# Patient Record
Sex: FEMALE | Race: WHITE | Employment: PART TIME | URBAN - METROPOLITAN AREA
[De-identification: names, ages, dates, MRNs, and addresses within clinical notes are randomized per-mention and may not be internally consistent; named-entity substitution may affect disease eponyms.]

---
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
## 2018-06-05 ENCOUNTER — HOSPITAL ENCOUNTER (OUTPATIENT)
Age: 18
Setting detail: SPECIMEN
Discharge: HOME | End: 2018-06-05
Payer: COMMERCIAL

## 2018-06-05 ENCOUNTER — NURSE ONLY (OUTPATIENT)
Dept: FAMILY MEDICINE CLINIC | Age: 18
End: 2018-06-05
Payer: COMMERCIAL

## 2018-06-05 VITALS — HEART RATE: 80 BPM | RESPIRATION RATE: 20 BRPM | TEMPERATURE: 98 F

## 2018-06-05 DIAGNOSIS — R39.15 URGENCY OF URINATION: ICD-10-CM

## 2018-06-05 DIAGNOSIS — R39.15 URGENCY OF URINATION: Primary | ICD-10-CM

## 2018-06-05 LAB
BILIRUBIN, POC: NORMAL
BLOOD URINE, POC: NORMAL
CLARITY, POC: CLEAR
COLOR, POC: NORMAL
GLUCOSE URINE, POC: NORMAL
KETONES, POC: NORMAL
LEUKOCYTE EST, POC: NORMAL
NITRITE, POC: NORMAL
PH, POC: 6
PROTEIN, POC: NORMAL
SPECIFIC GRAVITY, POC: 1.02
UROBILINOGEN, POC: NORMAL

## 2018-06-05 PROCEDURE — 81002 URINALYSIS NONAUTO W/O SCOPE: CPT | Performed by: NURSE PRACTITIONER

## 2018-06-05 PROCEDURE — 99211 OFF/OP EST MAY X REQ PHY/QHP: CPT | Performed by: NURSE PRACTITIONER

## 2018-06-06 ENCOUNTER — HOSPITAL ENCOUNTER (OUTPATIENT)
Age: 18
Setting detail: SPECIMEN
Discharge: HOME | End: 2018-06-06
Payer: COMMERCIAL

## 2018-06-06 ENCOUNTER — OFFICE VISIT (OUTPATIENT)
Dept: FAMILY MEDICINE CLINIC | Age: 18
End: 2018-06-06
Payer: COMMERCIAL

## 2018-06-06 VITALS
BODY MASS INDEX: 32.02 KG/M2 | SYSTOLIC BLOOD PRESSURE: 120 MMHG | TEMPERATURE: 97.6 F | WEIGHT: 204 LBS | RESPIRATION RATE: 20 BRPM | HEIGHT: 67 IN | DIASTOLIC BLOOD PRESSURE: 78 MMHG | HEART RATE: 92 BPM

## 2018-06-06 DIAGNOSIS — N76.0 BV (BACTERIAL VAGINOSIS): ICD-10-CM

## 2018-06-06 DIAGNOSIS — N94.10 DYSPAREUNIA, FEMALE: ICD-10-CM

## 2018-06-06 DIAGNOSIS — Z91.030 HISTORY OF BEE STING ALLERGY: ICD-10-CM

## 2018-06-06 DIAGNOSIS — N89.8 VAGINAL IRRITATION: ICD-10-CM

## 2018-06-06 DIAGNOSIS — N89.8 VAGINAL IRRITATION: Primary | ICD-10-CM

## 2018-06-06 DIAGNOSIS — B96.89 BV (BACTERIAL VAGINOSIS): ICD-10-CM

## 2018-06-06 LAB
C. TRACHOMATIS DNA ,URINE: NEGATIVE
CULTURE: NORMAL
CULTURE: NORMAL
DIRECT EXAM: ABNORMAL
Lab: ABNORMAL
Lab: NORMAL
SPECIMEN DESCRIPTION: ABNORMAL
SPECIMEN DESCRIPTION: NORMAL
STATUS: ABNORMAL
STATUS: NORMAL

## 2018-06-06 PROCEDURE — 99214 OFFICE O/P EST MOD 30 MIN: CPT | Performed by: NURSE PRACTITIONER

## 2018-06-06 RX ORDER — METRONIDAZOLE 500 MG/1
500 TABLET ORAL 2 TIMES DAILY
Qty: 14 TABLET | Refills: 0 | Status: SHIPPED | OUTPATIENT
Start: 2018-06-06 | End: 2018-06-13

## 2018-06-06 RX ORDER — EPINEPHRINE 0.3 MG/.3ML
0.3 INJECTION SUBCUTANEOUS ONCE
Qty: 1 EACH | Refills: 0 | Status: SHIPPED | OUTPATIENT
Start: 2018-06-06 | End: 2021-02-18

## 2018-06-06 ASSESSMENT — ENCOUNTER SYMPTOMS
VOMITING: 0
DIARRHEA: 0
COUGH: 0
CONSTIPATION: 0
SHORTNESS OF BREATH: 0
NAUSEA: 0
ABDOMINAL PAIN: 0

## 2018-06-06 ASSESSMENT — PATIENT HEALTH QUESTIONNAIRE - PHQ9
SUM OF ALL RESPONSES TO PHQ9 QUESTIONS 1 & 2: 0
1. LITTLE INTEREST OR PLEASURE IN DOING THINGS: 0
SUM OF ALL RESPONSES TO PHQ QUESTIONS 1-9: 0
2. FEELING DOWN, DEPRESSED OR HOPELESS: 0

## 2018-08-01 ENCOUNTER — OFFICE VISIT (OUTPATIENT)
Dept: FAMILY MEDICINE CLINIC | Age: 18
End: 2018-08-01
Payer: COMMERCIAL

## 2018-08-01 ENCOUNTER — HOSPITAL ENCOUNTER (OUTPATIENT)
Age: 18
Setting detail: SPECIMEN
Discharge: HOME | End: 2018-08-01
Payer: COMMERCIAL

## 2018-08-01 VITALS
TEMPERATURE: 98.3 F | BODY MASS INDEX: 32.49 KG/M2 | DIASTOLIC BLOOD PRESSURE: 82 MMHG | HEIGHT: 67 IN | OXYGEN SATURATION: 97 % | WEIGHT: 207 LBS | HEART RATE: 82 BPM | SYSTOLIC BLOOD PRESSURE: 108 MMHG

## 2018-08-01 DIAGNOSIS — Z72.51 HIGH RISK SEXUAL BEHAVIOR: ICD-10-CM

## 2018-08-01 DIAGNOSIS — N89.8 VAGINAL DISCHARGE: ICD-10-CM

## 2018-08-01 DIAGNOSIS — R30.0 DYSURIA: ICD-10-CM

## 2018-08-01 DIAGNOSIS — N94.10 DYSPAREUNIA IN FEMALE: Primary | ICD-10-CM

## 2018-08-01 LAB
BILIRUBIN, POC: NEGATIVE
BLOOD URINE, POC: NEGATIVE
CLARITY, POC: CLEAR
COLOR, POC: YELLOW
CONTROL: DETECTED
DIRECT EXAM: NORMAL
GLUCOSE URINE, POC: NORMAL
KETONES, POC: NEGATIVE
LEUKOCYTE EST, POC: NEGATIVE
Lab: NORMAL
NITRITE, POC: NEGATIVE
PH, POC: 6.5
PREGNANCY TEST URINE, POC: NEGATIVE
PROTEIN, POC: NEGATIVE
SPECIFIC GRAVITY, POC: 1.02
SPECIMEN DESCRIPTION: NORMAL
STATUS: NORMAL
UROBILINOGEN, POC: NEGATIVE

## 2018-08-01 PROCEDURE — 99214 OFFICE O/P EST MOD 30 MIN: CPT | Performed by: NURSE PRACTITIONER

## 2018-08-01 PROCEDURE — 81025 URINE PREGNANCY TEST: CPT | Performed by: NURSE PRACTITIONER

## 2018-08-01 PROCEDURE — 81002 URINALYSIS NONAUTO W/O SCOPE: CPT | Performed by: NURSE PRACTITIONER

## 2018-08-01 ASSESSMENT — ENCOUNTER SYMPTOMS
NAUSEA: 0
SORE THROAT: 0
EYE REDNESS: 0
COUGH: 0
BLOOD IN STOOL: 0
CHEST TIGHTNESS: 0
BACK PAIN: 0
VOMITING: 0
WHEEZING: 0
SHORTNESS OF BREATH: 0
ABDOMINAL DISTENTION: 0
RHINORRHEA: 0
ABDOMINAL PAIN: 0
DIARRHEA: 0
SINUS PRESSURE: 0
EYE DISCHARGE: 0
EYE PAIN: 0
CONSTIPATION: 0

## 2018-08-01 NOTE — PROGRESS NOTES
Subjective:      Patient ID: Tameka Bryan is a 19 y.o. female. Visit Information    Have you changed or started any medications since your last visit including any over-the-counter medicines, vitamins, or herbal medicines? no   Are you having any side effects from any of your medications? -  no  Have you stopped taking any of your medications? Is so, why? -  no    Have you seen any other physician or provider since your last visit? No  Have you had any other diagnostic tests since your last visit? No  Have you been seen in the emergency room and/or had an admission to a hospital since we last saw you? No  Have you had your routine dental cleaning in the past 6 months? no    Have you activated your [employer] account? If not, what are your barriers? No     Patient Care Team:  Kamlesh Arguello MD as PCP - General (Internal Medicine)  ABBEY Negrete CNP as PCP - [facility] Attributed Provider    Medical History Review  Past Medical, Family, and Social History reviewed and does not contribute to the patient presenting condition    Health Maintenance   Topic Date Due    HIV screen  01/19/2013    Meningococcal (MCV) Vaccine Age 0-22 Years (1 of 1) 01/19/2014    Flu vaccine (1) 09/03/2018 (Originally 9/1/2018)    Chlamydia screen  06/05/2019    DTaP/Tdap/Td vaccine (7 - Td) 01/14/2021       PHQ Scores 6/6/2018 10/28/2016   PHQ2 Score 0 0   PHQ9 Score 0 0     Interpretation of Total Score Depression Severity: 1-4 = Minimal depression, 5-9 = Mild depression, 10-14 = Moderate depression, 15-19 = Moderately severe depression, 20-27 = Severe depression    Current Outpatient Prescriptions   Medication Sig Dispense Refill    EPINEPHrine (EPIPEN) 0.3 MG/0.3ML SOAJ injection Inject 0.3 mLs into the muscle once for 1 dose Use as directed for allergic reaction 1 each 0     No current facility-administered medications for this visit.         Vaginal Pain   The patient's primary symptoms include vaginal normal. She exhibits no distension. There is no hepatosplenomegaly. There is no tenderness. There is no rebound and no CVA tenderness. Genitourinary: No labial fusion. There is no rash, tenderness or lesion on the right labia. There is no rash, tenderness or lesion on the left labia. No tenderness in the vagina. Vaginal discharge (moderate amount pale yellow fluid) found. Genitourinary Comments: MIC Back present for exam    Musculoskeletal: Normal range of motion. She exhibits no edema or tenderness. Lymphadenopathy:     She has no cervical adenopathy. Neurological: She is alert and oriented to person, place, and time. She has normal strength. She exhibits normal muscle tone. Coordination normal.   Skin: Skin is warm, dry and intact. No rash noted. No erythema. Psychiatric: She has a normal mood and affect. Her speech is normal and behavior is normal. Thought content normal.   Nursing note and vitals reviewed. Assessment:      Diagnosis Orders   1. Dyspareunia in female  POCT Urinalysis no Micro   2. Vaginal discharge  VAGINITIS DNA PROBE   3. High risk sexual behavior  POCT urine pregnancy    VAGINITIS DNA PROBE   4. Dysuria  POCT Urinalysis no Micro       Plan:     Proceed with UA today   Proceed with pregnancy test - negative  Proceed with send vaginitis panel  Gave contact information for [employer]   Use lubricant for intercourse /delay penetration to help with pain  Monitor for worsening symptoms   Call with concerns   Return if symptoms worsen or fail to improve. Diamond Headley received counseling on the following healthy behaviors: nutrition, exercise and medication adherence  Reviewed prior labs and health maintenance  Continue current medications, diet and exercise. Discussed use, benefit, and side effects of prescribed medications. Barriers to medication compliance addressed.    Patient given educational materials - see patient instructions  Was a self-tracking handout given in paper form or via [address]? No    Requested Prescriptions      No prescriptions requested or ordered in this encounter       All patient questions answered. Patient voiced understanding. Quality Measures    Body mass index is 32.42 kg/m². Elevated. Weight control planned discussed Healthy diet and regular exercise. BP: 108/82 Blood pressure is normal. Treatment plan consists of No treatment change needed.     No results found for: LDLCALC, LDLCHOLESTEROL, LDLDIRECT (goal LDL reduction with dx if diabetes is 50% LDL reduction)      PHQ Scores 6/6/2018 10/28/2016   PHQ2 Score 0 0   PHQ9 Score 0 0     Interpretation of Total Score Depression Severity: 1-4 = Minimal depression, 5-9 = Mild depression, 10-14 = Moderate depression, 15-19 = Moderately severe depression, 20-27 = Severe depression          Electronically signed by ABBEY Grijalva CNP on 8/6/2018 at 12:38 AM

## 2018-08-01 NOTE — PATIENT INSTRUCTIONS
Patient Education        Painful Sex: Care Instructions  Your Care Instructions    Painful sex can be caused by many things. You may have an injury, an infection, or a growth in your vagina. Or maybe you have muscle spasms. In some cases, the pain is caused by another medical condition, such as a spinal problem. Some medicines can cause dryness in the vagina. And as a woman gets older, her vagina gets drier. It may also narrow, shorten, and get stiffer. This dryness can make sex painful. Talk to your doctor about what might be causing your painful sex. Treatment may help. Follow-up care is a key part of your treatment and safety. Be sure to make and go to all appointments, and call your doctor if you are having problems. It's also a good idea to know your test results and keep a list of the medicines you take. How can you care for yourself at home? · Use a vaginal lubricant during sex. Examples are Astroglide, K-Y Jelly, and Wet Gel Lubricant. · Increase the time you and your partner spend touching each other before sex. This is called foreplay. · Try different positions for sex to find the most comfortable ones. · Ask your doctor about exercises to strengthen and relax your pelvic muscles. · Before sex, take a warm bath. This can relax you and reduce anxiety. · If your doctor prescribes any medicines, take them exactly as prescribed. Call your doctor if you think you are having a problem with your medicine. When should you call for help? Watch closely for changes in your health, and be sure to contact your doctor if you have any problems. Where can you learn more? Go to https://juanito.Trovebox. org and sign in to your [employer] account. Enter G982 in the [place] box to learn more about \"Painful Sex: Care Instructions. \"     If you do not have an account, please click on the \"Sign Up Now\" link.   Current as of: October 6, 2017  Content Version: 11.6  © 4750-1827 Healthwise, Incorporated. Care instructions adapted under license by [facility] ([facility]). If you have questions about a medical condition or this instruction, always ask your healthcare professional. [place] [address] any warranty or liability for your use of this information. Patient Education        Painful Urination (Dysuria): Care Instructions  Your Care Instructions  Burning pain with urination (dysuria) is a common symptom of a urinary tract infection or other urinary problems. The bladder may become inflamed. This can cause pain when the bladder fills and empties. You may also feel pain if the tube that carries urine from the bladder to the outside of the body (urethra) gets irritated or infected. Sexually transmitted infections (STIs) also may cause pain when you urinate. Sometimes the pain can be caused by things other than an infection. The urethra can be irritated by soaps, perfumes, or foreign objects in the urethra. Kidney stones can cause pain when they pass through the urethra. The cause may be hard to find. You may need tests. Treatment for painful urination depends on the cause. Follow-up care is a key part of your treatment and safety. Be sure to make and go to all appointments, and call your doctor if you are having problems. It's also a good idea to know your test results and keep a list of the medicines you take. How can you care for yourself at home? · Drink extra water for the next day or two. This will help make the urine less concentrated. (If you have kidney, heart, or liver disease and have to limit fluids, talk with your doctor before you increase the amount of fluids you drink.)  · Avoid drinks that are carbonated or have caffeine. They can irritate the bladder. · Urinate often. Try to empty your bladder each time. For women:  · Urinate right after you have sex. · After going to the bathroom, wipe from front to back.   · Avoid douches, bubble baths, and feminine hygiene sprays. And avoid other feminine hygiene products that have deodorants. When should you call for help? Call your doctor now or seek immediate medical care if:    · You have new symptoms, such as fever, nausea, or vomiting.     · You have new or worse symptoms of a urinary problem. For example:  ¨ You have blood or pus in your urine. ¨ You have chills or body aches. ¨ It hurts worse to urinate. ¨ You have groin or belly pain. ¨ You have pain in your back just below your rib cage (the flank area).    Watch closely for changes in your health, and be sure to contact your doctor if you have any problems. Where can you learn more? Go to https://SimmerypeInstacarteb.Cable-Sense. org and sign in to your ScalingData account. Enter P287 in the Offermatica box to learn more about \"Painful Urination (Dysuria): Care Instructions. \"     If you do not have an account, please click on the \"Sign Up Now\" link. Current as of: May 12, 2017  Content Version: 11.6  © 8754-2847 [employer]App, Incorporated. Care instructions adapted under license by [facility] ([facility]). If you have questions about a medical condition or this instruction, always ask your healthcare professional. [address] any warranty or liability for your use of this information.

## 2019-05-31 ENCOUNTER — OFFICE VISIT (OUTPATIENT)
Dept: FAMILY MEDICINE CLINIC | Age: 19
End: 2019-05-31
Payer: COMMERCIAL

## 2019-05-31 VITALS
RESPIRATION RATE: 20 BRPM | BODY MASS INDEX: 37.43 KG/M2 | SYSTOLIC BLOOD PRESSURE: 110 MMHG | HEART RATE: 80 BPM | DIASTOLIC BLOOD PRESSURE: 68 MMHG | WEIGHT: 239 LBS | TEMPERATURE: 97.7 F

## 2019-05-31 DIAGNOSIS — N64.4 PAIN OF RIGHT BREAST: Primary | ICD-10-CM

## 2019-05-31 LAB
CONTROL: NORMAL
PREGNANCY TEST URINE, POC: NORMAL

## 2019-05-31 PROCEDURE — 99213 OFFICE O/P EST LOW 20 MIN: CPT | Performed by: PEDIATRICS

## 2019-05-31 PROCEDURE — 81025 URINE PREGNANCY TEST: CPT | Performed by: PEDIATRICS

## 2019-05-31 ASSESSMENT — ENCOUNTER SYMPTOMS
SHORTNESS OF BREATH: 0
WHEEZING: 0
COUGH: 0
STRIDOR: 0

## 2019-05-31 ASSESSMENT — PATIENT HEALTH QUESTIONNAIRE - PHQ9
SUM OF ALL RESPONSES TO PHQ QUESTIONS 1-9: 0
1. LITTLE INTEREST OR PLEASURE IN DOING THINGS: 0
SUM OF ALL RESPONSES TO PHQ QUESTIONS 1-9: 0
2. FEELING DOWN, DEPRESSED OR HOPELESS: 0
SUM OF ALL RESPONSES TO PHQ9 QUESTIONS 1 & 2: 0

## 2019-05-31 NOTE — PROGRESS NOTES
Subjective:      Patient ID: Rock Nelson is a 22 y.o. female. Visit Information    Have you changed or started any medications since your last visit including any over-the-counter medicines, vitamins, or herbal medicines? no   Are you having any side effects from any of your medications? -  no  Have you stopped taking any of your medications? Is so, why? -  no    Have you seen any other physician or provider since your last visit? No  Have you had any other diagnostic tests since your last visit? No  Have you been seen in the emergency room and/or had an admission to a hospital since we last saw you? No  Have you had your routine dental cleaning in the past 6 months? Have you activated your [employer] account? If not, what are your barriers?  No:      Patient Care Team:  ABBEY Wetzel CNP as PCP - General (Certified Nurse Practitioner)  ABBEY Wetzel CNP as PCP - [facility]    Medical History Review  Past Medical, Family, and Social History reviewed and does contribute to the patient presenting condition    Health Maintenance   Topic Date Due    Varicella Vaccine (2 of 2 - 2-dose childhood series) 01/19/2002    HPV vaccine (1 - Female 3-dose series) 01/19/2013    HIV screen  01/19/2013    Cervical cancer screen  01/19/2019    Chlamydia screen  06/05/2019    Flu vaccine (Season Ended) 09/01/2019    DTaP/Tdap/Td vaccine (7 - Td) 01/14/2021    Meningococcal (ACWY) Vaccine  Aged Out    Pneumococcal 0-64 years Vaccine  Aged Out       [facility] Scores 5/31/2019 6/6/2018 10/28/2016   PHQ2 Score 0 0 0   PHQ9 Score 0 0 0     Interpretation of Total Score DepressionSeverity: 1-4 = Minimal depression, 5-9 = Mild depression, 10-14 = Moderate depression, 15-19 = Moderately severe depression, 20-27 = Severe depression    Current Outpatient Medications   Medication Sig Dispense Refill    EPINEPHrine (EPIPEN) 0.3 MG/0.3ML SOAJ injection Inject 0.3 mLs into the muscle once for 1 dose Use Arm, Position: Sitting, Cuff Size: Medium Adult)   Pulse 80   Temp 97.7 °F (36.5 °C) (Tympanic)   Resp 20   Wt 239 lb (108.4 kg)   LMP 05/31/2019 (Exact Date)   Breastfeeding? No   BMI 37.43 kg/m²        Physical Exam   Pulmonary/Chest: Right breast exhibits tenderness. Right breast exhibits no inverted nipple, no mass, no nipple discharge and no skin change. Left breast exhibits no inverted nipple, no mass, no nipple discharge, no skin change and no tenderness. There is breast tenderness. No breast swelling, discharge or bleeding. Breasts are symmetrical.   Some tenderness over the right breast with some mild fibrocystic changes. There is no specific palpable lump. There is some very mild tenderness over the pectoralis muscle on the right. She does have some fibrocystic changes in the left breast as well. Nursing note and vitals reviewed. Assessment:      Diagnosis Orders   1. Pain of right breast  ANG DIGITAL DIAGNOSTIC W OR WO CAD BILATERAL    POCT urine pregnancy       Plan:     Obtain urine pregnancy test to rule out pregnancy -  Negative   Obtain bilateral diagnostic mammogram for evaluation of right breast pain  Reassured that there is no mass or lump that is palpated on exam   Call with concerns or worsening symptoms      Naye received counseling on the following healthy behaviors: nutrition, exercise and medication adherence  Reviewed prior labs and health maintenance. Continue current medications, diet and exercise. Discussed use, benefit, and side effects of prescribed medications. Barriers to medication compliance addressed. Patient given educational materials - see patient instructions. All patient questions answered. Patient voiced understanding.        Electronically signed by Cher Santana MD on 6/2/2019 at 10:59 AM

## 2019-06-02 ASSESSMENT — ENCOUNTER SYMPTOMS
EYE PAIN: 0
COLOR CHANGE: 0
RHINORRHEA: 0
CHEST TIGHTNESS: 0
CONSTIPATION: 0
ABDOMINAL PAIN: 0
EYE DISCHARGE: 0
EYE REDNESS: 0
DIARRHEA: 0

## 2019-07-23 ENCOUNTER — TELEPHONE (OUTPATIENT)
Dept: FAMILY MEDICINE CLINIC | Age: 19
End: 2019-07-23

## 2019-07-31 ENCOUNTER — HOSPITAL ENCOUNTER (OUTPATIENT)
Dept: ULTRASOUND IMAGING | Age: 19
Discharge: HOME | End: 2019-08-02
Payer: COMMERCIAL

## 2019-07-31 ENCOUNTER — HOSPITAL ENCOUNTER (OUTPATIENT)
Dept: MAMMOGRAPHY | Age: 19
Discharge: HOME | End: 2019-08-02
Payer: COMMERCIAL

## 2019-07-31 DIAGNOSIS — N64.4 PAIN OF RIGHT BREAST: ICD-10-CM

## 2019-07-31 PROCEDURE — 76642 ULTRASOUND BREAST LIMITED: CPT

## 2020-01-21 ENCOUNTER — OFFICE VISIT (OUTPATIENT)
Dept: FAMILY MEDICINE CLINIC | Age: 20
End: 2020-01-21
Payer: COMMERCIAL

## 2020-01-21 VITALS
TEMPERATURE: 97.2 F | WEIGHT: 245.7 LBS | DIASTOLIC BLOOD PRESSURE: 80 MMHG | RESPIRATION RATE: 20 BRPM | HEIGHT: 66 IN | BODY MASS INDEX: 39.49 KG/M2 | HEART RATE: 71 BPM | OXYGEN SATURATION: 98 % | SYSTOLIC BLOOD PRESSURE: 130 MMHG

## 2020-01-21 PROCEDURE — 99395 PREV VISIT EST AGE 18-39: CPT | Performed by: NURSE PRACTITIONER

## 2020-01-21 RX ORDER — PHENTERMINE HYDROCHLORIDE 37.5 MG/1
37.5 TABLET ORAL
Qty: 30 TABLET | Refills: 0 | Status: SHIPPED | OUTPATIENT
Start: 2020-01-21 | End: 2020-02-18

## 2020-01-21 ASSESSMENT — PATIENT HEALTH QUESTIONNAIRE - PHQ9
1. LITTLE INTEREST OR PLEASURE IN DOING THINGS: 0
SUM OF ALL RESPONSES TO PHQ9 QUESTIONS 1 & 2: 0
2. FEELING DOWN, DEPRESSED OR HOPELESS: 0
SUM OF ALL RESPONSES TO PHQ QUESTIONS 1-9: 0
SUM OF ALL RESPONSES TO PHQ QUESTIONS 1-9: 0

## 2020-01-21 ASSESSMENT — ENCOUNTER SYMPTOMS
EYE REDNESS: 0
DIARRHEA: 0
COLOR CHANGE: 0
ABDOMINAL PAIN: 0
STRIDOR: 0
RHINORRHEA: 0
CHEST TIGHTNESS: 0
EYE PAIN: 0
CONSTIPATION: 0
SHORTNESS OF BREATH: 0
WHEEZING: 0
EYE DISCHARGE: 0
COUGH: 0

## 2020-01-21 NOTE — PROGRESS NOTES
History of Present Illness:     Georgette Garcia is a 23 y.o. female who presents in office today with Self to establish care but also to discuss weight management. Patient would like to start phentermine. Mother is currently taking. Would like to lose about 70 lb. Working out every few weeks - has elliptical and stationary bike, exercise videos at home. Active with coaching volleyball. Patient has never tried weight loss medication previously. She had OTC weight loss supplement but did not feel it was helpful. Feels like she eats too much and not enough exercise. HPI    Patient Care Team:  ABBEY Grant - CNP as PCP - General (Nurse Practitioner)  Lulú Vidal MD as PCP - [facility]    Visit Information    Have you changed or started any medications since your last visit including any over-the-counter medicines, vitamins, or herbal medicines? yes - Med list updated   Are you having any side effects from any of your medications? -  no  Have you stopped taking any of your medications? Is so, why? -  no  Have you seen any other physician or provider since your last visit? No  Have you had any other diagnostic tests since your last visit? Yes - Records Obtained Mammogram, US breast  Have you been seen in the emergency room and/or had an admission to a hospital since we last saw you? No  Have you had your routine dental cleaning in the past 6 months? Yes  Have you activated your zintin account? No  If activated, Do you have the mobile fermin and comfortable using functions? No    Reviewed     [x] Past Medical, Family, and Social History was reviewed per writer and does contribute to the patient presenting condition.     [x] Laboratory Results, Vital signs, Imaging, Active Problems, Immunizations, Current/Recently Discontinued Medications, Health Maintenance Activities Due, Referral Notes (if available) were reviewed per writer     [x] Reviewed Depression screening if taken or valid Head: Normocephalic and atraumatic. Eyes:      Pupils: Pupils are equal, round, and reactive to light. Neck:      Musculoskeletal: Normal range of motion and neck supple. Cardiovascular:      Rate and Rhythm: Normal rate and regular rhythm. Pulses: Normal pulses. Heart sounds: Normal heart sounds. Pulmonary:      Effort: Pulmonary effort is normal.      Breath sounds: Normal breath sounds. Abdominal:      General: Abdomen is flat. Tenderness: There is no tenderness. Comments: Obesity   Musculoskeletal: Normal range of motion. Skin:     General: Skin is warm and dry. Neurological:      Mental Status: She is alert and oriented to person, place, and time. Motor: No weakness. Psychiatric:         Mood and Affect: Mood normal.         Behavior: Behavior normal.         Thought Content: Thought content normal.         Judgment: Judgment normal.       Diagnoses / Plan:     1. Well adult health check    - CBC Auto Differential; Future  - Comprehensive Metabolic Panel, Fasting; Future  - Lipid, Fasting; Future  - TSH with Reflex; Future    2. Class 2 obesity due to excess calories without serious comorbidity with body mass index (BMI) of 39.0 to 39.9 in adult    - phentermine (ADIPEX-P) 37.5 MG tablet; Take 1 tablet by mouth every morning (before breakfast) for 30 days. Dispense: 30 tablet; Refill: 0  - Insulin, Total; Future    3. BMI 39.0-39.9,adult    - phentermine (ADIPEX-P) 37.5 MG tablet; Take 1 tablet by mouth every morning (before breakfast) for 30 days. Dispense: 30 tablet; Refill: 0  - Insulin, Total; Future    Declined pregnancy test today. Hasn't had sex in interim, last period 2-3 weeks ago.      Items for Patient/Writer/Staff to [facility] established or re(established) in office today, Reviewed Recent Records in System from urgent care/EMERGENCY ROOM/Admission/Specialist, Medications sent and will be available at pharmacy or mail away, Patient: Blood work

## 2020-02-18 ENCOUNTER — OFFICE VISIT (OUTPATIENT)
Dept: FAMILY MEDICINE CLINIC | Age: 20
End: 2020-02-18
Payer: COMMERCIAL

## 2020-02-18 VITALS
RESPIRATION RATE: 16 BRPM | TEMPERATURE: 97.5 F | DIASTOLIC BLOOD PRESSURE: 80 MMHG | BODY MASS INDEX: 37.32 KG/M2 | OXYGEN SATURATION: 97 % | SYSTOLIC BLOOD PRESSURE: 112 MMHG | HEART RATE: 74 BPM | WEIGHT: 231.2 LBS

## 2020-02-18 PROCEDURE — 99213 OFFICE O/P EST LOW 20 MIN: CPT | Performed by: NURSE PRACTITIONER

## 2020-02-18 RX ORDER — PHENTERMINE HYDROCHLORIDE 37.5 MG/1
37.5 TABLET ORAL
Qty: 30 TABLET | Refills: 0 | Status: SHIPPED | OUTPATIENT
Start: 2020-02-20 | End: 2020-03-17 | Stop reason: SDUPTHER

## 2020-02-18 ASSESSMENT — ENCOUNTER SYMPTOMS
COLOR CHANGE: 0
DIARRHEA: 0
EYE REDNESS: 0
EYE PAIN: 0
ABDOMINAL PAIN: 0
CONSTIPATION: 0
COUGH: 0
WHEEZING: 0
RHINORRHEA: 0
STRIDOR: 0
CHEST TIGHTNESS: 0
EYE DISCHARGE: 0
SHORTNESS OF BREATH: 0

## 2020-02-18 NOTE — PROGRESS NOTES
History of Present Illness:     Akila Dobson is a 23 y.o. female who presents in office today with Self medication specific follow up for adipex. Patient has lost over 15 lb since last visit. Patient has been limiting her food intake; has suppressed her appetite appropriately. Sometimes does not feel hungry at all but knows she needs to eat. Not working out this month due to wedding planning and busy work schedule. Dry mouth is light but not bad. No other concerns right now. HPI    Patient Care Team:  ABBEY Rhodes CNP as PCP - General (Nurse Practitioner)  ABBEY Rhodes CNP as PCP - [facility] Empaneled Provider    Visit Information    Have you changed or started any medications since your last visit including any over-the-counter medicines, vitamins, or herbal medicines? no   Are you having any side effects from any of your medications? -  no  Have you stopped taking any of your medications? Is so, why? -  no  Have you seen any other physician or provider since your last visit? No  Have you had any other diagnostic tests since your last visit? No  Have you been seen in the emergency room and/or had an admission to a hospital since we last saw you? No  Have you had your routine dental cleaning in the past 6 months? Yes  Have you activated your [employer] account? No pending  If activated, Do you have the mobile fermin and comfortable using functions? No    Reviewed     [x] Past Medical, Family, and Social History was reviewed per writer and does contribute to the patient presenting condition.     [x] Laboratory Results, Vital signs, Imaging, Active Problems, Immunizations, Current/Recently Discontinued Medications, Health Maintenance Activities Due, Referral Notes (if available) were reviewed per writer     [x] Reviewed Depression screening if taken or valid today or any other valid screening tool (others seen below) Interpretation of Total Score DepressionSeverity: 1-4 = Minimal depression, 5-9 = Mild depression, 10-14 = Moderate depression, 15-19 = Moderately severe depression, 20-27 =Severe depression    PHQ Scores 1/21/2020 5/31/2019 6/6/2018 10/28/2016   PHQ2 Score 0 0 0 0   PHQ9 Score 0 0 0 0     Interpretation of Total Score Depression Severity: 1-4 = Minimal depression, 5-9 = Mild depression, 10-14 = Moderate depression, 15-19 = Moderately severe depression, 20-27 = Severe depression     Review of Systems (Subjective)     Review of Systems   Constitutional: Negative for appetite change, fatigue and fever. HENT: Negative for congestion, postnasal drip and rhinorrhea. Eyes: Negative for pain, discharge and redness. Respiratory: Negative for cough, chest tightness, shortness of breath, wheezing and stridor. Cardiovascular: Negative for chest pain, palpitations and leg swelling. Gastrointestinal: Negative for abdominal pain, constipation and diarrhea. Endocrine: Negative for polydipsia, polyphagia and polyuria. Genitourinary: Negative for decreased urine volume, dysuria, hematuria, vaginal bleeding, vaginal discharge and vaginal pain. Irregular periods. LMP : 2-3 weeks ago. Musculoskeletal: Negative for arthralgias and myalgias. Skin: Negative for color change and rash. Allergic/Immunologic: Negative for immunocompromised state. Neurological: Negative for dizziness, light-headedness and headaches. Hematological: Negative for adenopathy. Does not bruise/bleed easily. Physical Assessment (Objective)     /80 (Site: Right Upper Arm, Position: Sitting, Cuff Size: Large Adult)   Pulse 74   Temp 97.5 °F (36.4 °C) (Tympanic)   Resp 16   Wt 231 lb 3.2 oz (104.9 kg)   LMP 02/11/2020 (Within Days)   SpO2 97%   Breastfeeding No   BMI 37.32 kg/m²      Physical Exam  Constitutional:       Appearance: Normal appearance. HENT:      Head: Normocephalic and atraumatic. Eyes:      Pupils: Pupils are equal, round, and reactive to light.    Neck:      Musculoskeletal: Dorene Mcarthur in [facility]  [email or web address]   Office: [phone]   Cell: [phone]

## 2020-03-17 ENCOUNTER — OFFICE VISIT (OUTPATIENT)
Dept: FAMILY MEDICINE CLINIC | Age: 20
End: 2020-03-17
Payer: COMMERCIAL

## 2020-03-17 VITALS
WEIGHT: 226.1 LBS | BODY MASS INDEX: 36.49 KG/M2 | OXYGEN SATURATION: 98 % | TEMPERATURE: 96.7 F | SYSTOLIC BLOOD PRESSURE: 118 MMHG | DIASTOLIC BLOOD PRESSURE: 82 MMHG | HEART RATE: 72 BPM

## 2020-03-17 PROBLEM — E66.812 CLASS 2 OBESITY DUE TO EXCESS CALORIES WITHOUT SERIOUS COMORBIDITY WITH BODY MASS INDEX (BMI) OF 36.0 TO 36.9 IN ADULT: Status: ACTIVE | Noted: 2020-03-17

## 2020-03-17 PROBLEM — E66.09 CLASS 2 OBESITY DUE TO EXCESS CALORIES WITHOUT SERIOUS COMORBIDITY WITH BODY MASS INDEX (BMI) OF 36.0 TO 36.9 IN ADULT: Status: ACTIVE | Noted: 2020-03-17

## 2020-03-17 PROCEDURE — 99214 OFFICE O/P EST MOD 30 MIN: CPT | Performed by: NURSE PRACTITIONER

## 2020-03-17 RX ORDER — PHENTERMINE HYDROCHLORIDE 37.5 MG/1
37.5 TABLET ORAL
Qty: 30 TABLET | Refills: 0 | Status: SHIPPED | OUTPATIENT
Start: 2020-03-17 | End: 2020-10-06 | Stop reason: SDUPTHER

## 2020-03-17 ASSESSMENT — ENCOUNTER SYMPTOMS
STRIDOR: 0
RHINORRHEA: 0
EYE DISCHARGE: 0
CONSTIPATION: 0
COLOR CHANGE: 0
DIARRHEA: 0
SHORTNESS OF BREATH: 0
COUGH: 0
EYE PAIN: 0
CHEST TIGHTNESS: 0
EYE REDNESS: 0
WHEEZING: 0
ABDOMINAL PAIN: 0

## 2020-03-17 NOTE — PROGRESS NOTES
History of Present Illness:     Nicole Vergara is a 23 y.o. female who presents in office today with Self medication specific follow up for adipex. Patient has lost about 5 lb since last visit. No side effects from adipex this month. No concerns at this time. Nicole Vergara is a 23 y.o. female who presents in office today with Self medication specific follow up for adipex. Patient has lost over 5 lb since last visit. Patient has been limiting her food intake; has suppressed her appetite appropriately. Sometimes does not feel hungry at all but knows she needs to eat. Quarantine for COVID-19 making things difficult. Uri POTTER-CNP     HPI    Patient Care Team:  ABBEY Alvarez CNP as PCP - General (Nurse Practitioner)  ABBEY Alvarez CNP as PCP - [facility] Empaneled Provider    Reviewed     [x] Past Medical, Family, and Social History was reviewed per writer and does contribute to the patient presenting condition. [x] Laboratory Results, Vital signs, Imaging, Active Problems, Immunizations, Current/Recently Discontinued Medications, Health Maintenance Activities Due, Referral Notes (if available) were reviewed per writer     [x] Reviewed Depression screening if taken or valid today or any other valid screening tool (others seen below) Interpretation of Total Score DepressionSeverity: 1-4 = Minimal depression, 5-9 = Mild depression, 10-14 = Moderate depression, 15-19 = Moderately severe depression, 20-27 =Severe depression    PHQ Scores 1/21/2020 5/31/2019 6/6/2018 10/28/2016   PHQ2 Score 0 0 0 0   PHQ9 Score 0 0 0 0     Interpretation of Total Score Depression Severity: 1-4 = Minimal depression, 5-9 = Mild depression, 10-14 = Moderate depression, 15-19 = Moderately severe depression, 20-27 = Severe depression     Review of Systems (Subjective)     Review of Systems   Constitutional: Negative for appetite change, fatigue and fever.    HENT: Negative for congestion, postnasal drip and

## 2020-09-03 ENCOUNTER — OFFICE VISIT (OUTPATIENT)
Dept: FAMILY MEDICINE CLINIC | Age: 20
End: 2020-09-03
Payer: COMMERCIAL

## 2020-09-03 VITALS
WEIGHT: 231 LBS | OXYGEN SATURATION: 96 % | DIASTOLIC BLOOD PRESSURE: 70 MMHG | HEART RATE: 54 BPM | HEIGHT: 67 IN | TEMPERATURE: 97.3 F | BODY MASS INDEX: 36.26 KG/M2 | SYSTOLIC BLOOD PRESSURE: 115 MMHG

## 2020-09-03 PROCEDURE — 99214 OFFICE O/P EST MOD 30 MIN: CPT | Performed by: NURSE PRACTITIONER

## 2020-09-03 ASSESSMENT — ENCOUNTER SYMPTOMS
CONSTIPATION: 0
RHINORRHEA: 0
COLOR CHANGE: 0
ABDOMINAL PAIN: 0
EYE PAIN: 0
DIARRHEA: 0
SHORTNESS OF BREATH: 0
EYE DISCHARGE: 0
CHEST TIGHTNESS: 0
COUGH: 0
STRIDOR: 0
EYE REDNESS: 0
WHEEZING: 0

## 2020-09-03 ASSESSMENT — PATIENT HEALTH QUESTIONNAIRE - PHQ9
SUM OF ALL RESPONSES TO PHQ9 QUESTIONS 1 & 2: 0
SUM OF ALL RESPONSES TO PHQ QUESTIONS 1-9: 0
SUM OF ALL RESPONSES TO PHQ QUESTIONS 1-9: 0
1. LITTLE INTEREST OR PLEASURE IN DOING THINGS: 0
2. FEELING DOWN, DEPRESSED OR HOPELESS: 0

## 2020-09-03 NOTE — PROGRESS NOTES
Subjective:      Patient ID: Savannah Resendez is a 23 y.o. female. Visit Information    Have you changed or started any medications since your last visit including any over-the-counter medicines, vitamins, or herbal medicines? no   Are you having any side effects from any of your medications? -  no  Have you stopped taking any of your medications? Is so, why? -  no    Have you seen any other physician or provider since your last visit? OB GYN  Have you had any other diagnostic tests since your last visit? No  Have you been seen in the emergency room and/or had an admission to a hospital since we last saw you? No  Have you had your routine dental cleaning in the past 6 months? no    Have you activated your [employer] account? If not, what are your barriers?  Yes     Patient Care Team:  ABBEY Olivares NP as PCP - General (Nurse Practitioner)  ABBEY Olivares NP as PCP - [facility] Provider    Medical History Review  Past Medical, Family, and Social History reviewed and does contribute to the patient presenting condition    Health Maintenance   Topic Date Due    Flu vaccine (1) 10/03/2020 (Originally 9/1/2020)    HPV vaccine (1 - 2-dose series) 09/03/2021 (Originally 1/19/2009)    Cervical cancer screen  09/03/2021 (Originally 1/19/2019)    HIV screen  09/03/2021 (Originally 1/19/2013)    Chlamydia screen  09/03/2021 (Originally 6/5/2019)    DTaP/Tdap/Td vaccine (7 - Td) 01/14/2021    Hepatitis B vaccine  Completed    Hib vaccine  Completed    Hepatitis A vaccine  Aged Out    Meningococcal (ACWY) vaccine  Aged Out    Pneumococcal 0-64 years Vaccine  Aged Out    Varicella vaccine  Discontinued     /70 (Site: Left Upper Arm, Position: Sitting, Cuff Size: Large Adult)   Pulse 54   Temp 97.3 °F (36.3 °C) (Temporal)   Ht 5' 7\" (1.702 m)   Wt 231 lb (104.8 kg)   LMP 08/24/2020 (Approximate)   SpO2 96%   BMI 36.18 kg/m²      PHQ Scores 9/3/2020 1/21/2020 5/31/2019 6/6/2018 10/28/2016 bruise/bleed easily. Objective:   Physical Exam  Vitals signs and nursing note reviewed. Constitutional:       General: She is not in acute distress. Appearance: Normal appearance. She is obese. She is not ill-appearing. HENT:      Head: Normocephalic and atraumatic. Right Ear: Hearing and external ear normal. No decreased hearing noted. Left Ear: Hearing and external ear normal. No decreased hearing noted. Nose: Nose normal.      Mouth/Throat:      Lips: Pink. Eyes:      Pupils: Pupils are equal, round, and reactive to light. Neck:      Musculoskeletal: Normal range of motion and neck supple. Cardiovascular:      Rate and Rhythm: Normal rate and regular rhythm. Pulses: Normal pulses. Radial pulses are 2+ on the right side and 2+ on the left side. Heart sounds: Normal heart sounds. Pulmonary:      Effort: Pulmonary effort is normal.      Breath sounds: Normal breath sounds. No decreased breath sounds or wheezing. Abdominal:      General: Abdomen is flat. Tenderness: There is no abdominal tenderness. Comments: Obesity   Musculoskeletal: Normal range of motion. Skin:     General: Skin is warm and dry. Capillary Refill: Capillary refill takes less than 2 seconds. Neurological:      Mental Status: She is alert and oriented to person, place, and time. GCS: GCS eye subscore is 4. GCS verbal subscore is 5. GCS motor subscore is 6. Motor: Motor function is intact. No weakness. Coordination: Coordination is intact. Gait: Gait is intact. Psychiatric:         Attention and Perception: Attention normal.         Mood and Affect: Mood normal.         Speech: Speech normal.         Behavior: Behavior normal. Behavior is cooperative. Thought Content: Thought content normal.         Judgment: Judgment normal.         Assessment / Plan:     1.  Class 2 obesity due to excess calories without serious comorbidity with body mass

## 2020-10-05 ENCOUNTER — PATIENT MESSAGE (OUTPATIENT)
Dept: FAMILY MEDICINE CLINIC | Age: 20
End: 2020-10-05

## 2020-10-05 NOTE — TELEPHONE ENCOUNTER
From: Laura Pascal  To: ABBEY Barry NP  Sent: 10/5/2020 10:04 AM EDT  Subject: Prescription Question    Adin Haines,     During our last visit, you said to send you a message and you would send over a refill for phentermine. Let me know if you can, thank you!

## 2020-10-06 RX ORDER — PHENTERMINE HYDROCHLORIDE 37.5 MG/1
37.5 TABLET ORAL
Qty: 30 TABLET | Refills: 0 | Status: SHIPPED | OUTPATIENT
Start: 2020-10-06 | End: 2020-10-12 | Stop reason: ALTCHOICE

## 2020-10-11 ENCOUNTER — PATIENT MESSAGE (OUTPATIENT)
Dept: FAMILY MEDICINE CLINIC | Age: 20
End: 2020-10-11

## 2020-10-12 RX ORDER — PHENTERMINE HYDROCHLORIDE 37.5 MG/1
37.5 TABLET ORAL
Qty: 30 TABLET | Refills: 0 | Status: SHIPPED | OUTPATIENT
Start: 2020-10-12 | End: 2020-11-11

## 2020-10-12 NOTE — TELEPHONE ENCOUNTER
From: Elizabeth Guerrero  To: ABBEY Rico NP  Sent: 10/11/2020 5:14 PM EDT  Subject: Prescription Question    Could you send over a prescription for phentermine, like we talked about a month ago. It should be time for me to start taking it, please let me know.     Beth Merrill

## 2020-10-19 ENCOUNTER — NURSE TRIAGE (OUTPATIENT)
Dept: OTHER | Facility: CLINIC | Age: 20
End: 2020-10-19

## 2020-10-19 NOTE — TELEPHONE ENCOUNTER
Reason for Disposition   Scab drains pus or increases in size, and not improved after applying antibiotic ointment for 2 days    Answer Assessment - Initial Assessment Questions  1. TYPE: \"What type of sting was it? \" (bee, yellow jacket, etc.)       Bee sting    2. ONSET: \"When did it occur? \"       Yesterday afternoon     3. LOCATION: \"Where is the sting located? \"  \"How many stings? \"      Inside rt leg, above knee  Rt shin  Inside left leg- biggest    4. SWELLING SIZE: \"How big is the swelling? \" (e.g., inches or cm)      3 total- Golf ball size, tennis ball size, softball size     5. REDNESS: \"Is the area red or pink? \" If so, ask \"What size is area of redness? \" (e.g., inches or cm). \"When did the redness start? \"      Yes- light circles last evening but more red this morning. Have not gotten bigger since this morning unless she scratches them     6. PAIN: \"Is there any pain? \" If so, ask: \"How bad is it? \"  (Scale 1-10; or mild, moderate, severe)      Denies - just \"irritating\"    7. ITCHING: \"Is there any itching? \" If so, ask: \"How bad is it? \"       Yes - pretty severe    8. RESPIRATORY DISTRESS: \"Describe your breathing. \"      Breathing normally    9. PRIOR REACTIONS: \"Have you had any severe allergic reactions to stings in the past?\" if yes, ask: \"What happened? \"      Pt has an epi pen, but did not have it with her yesterday. Pt states the reason she has the epi pen is in case she can't breath, but has had similar reaction to this current one previously without treatment. Pt took benadryl last night and is using cortisone 10 itch cream.    10. OTHER SYMPTOMS: \"Do you have any other symptoms? \" (e.g., abdominal pain, face or tongue swelling, new rash elsewhere, vomiting)        Denies    11. PREGNANCY: \"Is there any chance you are pregnant? \" \"When was your last menstrual period? \"        Denies    In the past, pt's stings would \"pus\" before going back to normal size and is requesting to get on antibiotic to prevent this. Protocols used: BEE OR YELLOW JACKET STING-ADULT-[place]    Patient called pre-service center [facility]) [place] with red flag complaint. Brief description of triage: Pt calls in reporting she was stung 3x by a bee    Triage indicates for patient to to be seen in 3 days    Care advice provided, patient verbalizes understanding; denies any other questions or concerns; instructed to call back for any new or worsening symptoms. Writer provided warm transfer to naomi Colón at [facility] for appointment scheduling. Attention Provider: Thank you for allowing me to participate in the care of your patient. The patient was connected to triage in response to information provided to the ECC. Please do not respond through this encounter as the response is not directed to a shared pool.

## 2020-10-20 ENCOUNTER — OFFICE VISIT (OUTPATIENT)
Dept: FAMILY MEDICINE CLINIC | Age: 20
End: 2020-10-20
Payer: COMMERCIAL

## 2020-10-20 VITALS
TEMPERATURE: 97.2 F | DIASTOLIC BLOOD PRESSURE: 80 MMHG | SYSTOLIC BLOOD PRESSURE: 116 MMHG | WEIGHT: 232.2 LBS | BODY MASS INDEX: 36.44 KG/M2 | HEIGHT: 67 IN | HEART RATE: 81 BPM | OXYGEN SATURATION: 94 %

## 2020-10-20 PROCEDURE — 99213 OFFICE O/P EST LOW 20 MIN: CPT | Performed by: STUDENT IN AN ORGANIZED HEALTH CARE EDUCATION/TRAINING PROGRAM

## 2020-10-20 RX ORDER — TRIAMCINOLONE ACETONIDE 0.25 MG/G
CREAM TOPICAL
Qty: 1 TUBE | Refills: 0 | Status: SHIPPED | OUTPATIENT
Start: 2020-10-20 | End: 2022-06-21

## 2020-10-20 RX ORDER — CETIRIZINE HYDROCHLORIDE 10 MG/1
10 TABLET ORAL DAILY
Qty: 30 TABLET | Refills: 0 | Status: SHIPPED | OUTPATIENT
Start: 2020-10-20 | End: 2020-11-19

## 2020-10-20 RX ORDER — PREDNISONE 20 MG/1
40 TABLET ORAL DAILY
Qty: 10 TABLET | Refills: 0 | Status: SHIPPED | OUTPATIENT
Start: 2020-10-20 | End: 2020-10-25

## 2020-10-20 ASSESSMENT — ENCOUNTER SYMPTOMS
SORE THROAT: 0
ABDOMINAL DISTENTION: 0
WHEEZING: 0
CHEST TIGHTNESS: 0
BACK PAIN: 0
COUGH: 0
SHORTNESS OF BREATH: 0
DIARRHEA: 0
ABDOMINAL PAIN: 0
CONSTIPATION: 0

## 2020-10-20 NOTE — PROGRESS NOTES
Visit Information    Have you changed or started any medications since your last visit including any over-the-counter medicines, vitamins, or herbal medicines? no   Have you stopped taking any of your medications? Is so, why? -  no  Are you having any side effects from any of your medications? - no    Have you seen any other physician or provider since your last visit?  no   Have you had any other diagnostic tests since your last visit?  no   Have you been seen in the emergency room and/or had an admission in a hospital since we last saw you?  no   Have you had your routine dental cleaning in the past 6 months? Yes 10/08/20    Do you have an active MyChart account? If no, what is the barrier?   Yes    Patient Care Team:  ABBEY Rico NP as PCP - General (Nurse Practitioner)  ABBEY Rico NP as PCP - [facility] Provider    Medical History Review  Past Medical, Family, and Social History reviewed and does contribute to the patient presenting condition    Health Maintenance   Topic Date Due    Flu vaccine (1) 09/01/2020    HPV vaccine (1 - 2-dose series) 09/03/2021 (Originally 1/19/2009)    Cervical cancer screen  09/03/2021 (Originally 1/19/2019)    HIV screen  09/03/2021 (Originally 1/19/2013)    Chlamydia screen  09/03/2021 (Originally 6/5/2019)    DTaP/Tdap/Td vaccine (7 - Td) 01/14/2021    Hepatitis B vaccine  Completed    Hib vaccine  Completed    Hepatitis A vaccine  Aged Out    Meningococcal (ACWY) vaccine  Aged Out    Pneumococcal 0-64 years Vaccine  Aged Out    Varicella vaccine  Discontinued

## 2020-10-20 NOTE — PROGRESS NOTES
@[facility]@      10/20/2020      Kathy Oconnor is a 23 y.o. female here for the following evaluation regarding the following medical concerns:    HPI: 73-year-old female stung by bees 3 times right leg and left leg. Does have a history of bee allergy has an EpiPen at home. Symptoms include pain she has some pruritus. No anaphylaxis she is not having any trouble breathing. She works as a  recently graduated with a degree in marketing      Review of Systems   Constitutional: Negative for chills, fatigue and fever. HENT: Negative for congestion, postnasal drip and sore throat. Eyes: Negative for visual disturbance. Respiratory: Negative for cough, chest tightness, shortness of breath and wheezing. Cardiovascular: Negative. Gastrointestinal: Negative for abdominal distention, abdominal pain, constipation and diarrhea. Genitourinary: Negative for difficulty urinating, dysuria, frequency and urgency. Musculoskeletal: Negative for arthralgias, back pain and joint swelling. Skin: Negative for rash. Neurological: Negative for dizziness, weakness and light-headedness. Psychiatric/Behavioral: Negative for agitation, decreased concentration and sleep disturbance. Physical Exam  Vitals signs and nursing note reviewed. Constitutional:       Appearance: Normal appearance. HENT:      Head: Normocephalic and atraumatic. Eyes:      Extraocular Movements: Extraocular movements intact. Neck:      Musculoskeletal: Normal range of motion and neck supple. Cardiovascular:      Rate and Rhythm: Normal rate and regular rhythm. Pulses: Normal pulses. Heart sounds: Normal heart sounds. Pulmonary:      Effort: Pulmonary effort is normal.      Breath sounds: Normal breath sounds. Abdominal:      General: Abdomen is flat. Palpations: Abdomen is soft. Musculoskeletal: Normal range of motion. Skin:     General: Skin is warm.       Comments: 2 erythematous rashes on right leg 1 on left consistent with bee sting versus ant bite   Neurological:      General: No focal deficit present. Mental Status: She is alert and oriented to person, place, and time. Prior to Visit Medications    Medication Sig Taking? Authorizing Provider   predniSONE (DELTASONE) 20 MG tablet Take 2 tablets by mouth daily for 5 days Yes Reza Chun MD   triamcinolone (KENALOG) 0.025 % cream Apply Topically Yes Reza Chun MD   cetirizine (ZYRTEC) 10 MG tablet Take 1 tablet by mouth daily Yes Reza Chun MD   phentermine (ADIPEX-P) 37.5 MG tablet Take 1 tablet by mouth every morning (before breakfast) for 30 days. Yes ABBEY Oglesby NP   EPINEPHrine (EPIPEN) 0.3 MG/0.3ML SOAJ injection Inject 0.3 mLs into the muscle once for 1 dose Use as directed for allergic reaction Yes MabABBEY Aleman - CNP        Social History     Tobacco Use    Smoking status: Never Smoker    Smokeless tobacco: Never Used   Substance Use Topics    Alcohol use: No       Body mass index is 36.37 kg/m². Vitals:    10/20/20 1024   BP: 116/80   Site: Left Upper Arm   Position: Sitting   Cuff Size: Large Adult   Pulse: 81   Temp: 97.2 °F (36.2 °C)   TempSrc: Temporal   SpO2: 94%   Weight: 232 lb 3.2 oz (105.3 kg)   Height: 5' 7\" (1.702 m)        Kimmy Garcia was seen today for insect bite. Diagnoses and all orders for this visit:    Bee sting, accidental or unintentional, initial encounter  -     predniSONE (DELTASONE) 20 MG tablet; Take 2 tablets by mouth daily for 5 days  -     triamcinolone (KENALOG) 0.025 % cream; Apply Topically  -     cetirizine (ZYRTEC) 10 MG tablet;  Take 1 tablet by mouth daily      Steroids for inflammation oral and topical  NSAIDs for pain  Zyrtec for pruritus  Has instructions to return if symptoms do not improve  She declines flu shot    Reza Chun MD

## 2020-12-18 ENCOUNTER — OFFICE VISIT (OUTPATIENT)
Dept: OBGYN CLINIC | Age: 20
End: 2020-12-18
Payer: COMMERCIAL

## 2020-12-18 ENCOUNTER — HOSPITAL ENCOUNTER (OUTPATIENT)
Age: 20
Setting detail: SPECIMEN
Discharge: HOME | End: 2020-12-18
Payer: COMMERCIAL

## 2020-12-18 VITALS
DIASTOLIC BLOOD PRESSURE: 89 MMHG | WEIGHT: 237.4 LBS | HEART RATE: 87 BPM | HEIGHT: 67 IN | SYSTOLIC BLOOD PRESSURE: 133 MMHG | BODY MASS INDEX: 37.26 KG/M2

## 2020-12-18 PROBLEM — Z34.00 PRIMIGRAVIDA, ANTEPARTUM: Status: ACTIVE | Noted: 2020-12-18

## 2020-12-18 PROBLEM — Z28.21 INFLUENZA VACCINE REFUSED: Status: ACTIVE | Noted: 2020-12-18

## 2020-12-18 PROBLEM — E66.09 CLASS 2 OBESITY DUE TO EXCESS CALORIES WITHOUT SERIOUS COMORBIDITY WITH BODY MASS INDEX (BMI) OF 36.0 TO 36.9 IN ADULT: Status: RESOLVED | Noted: 2020-03-17 | Resolved: 2020-12-18

## 2020-12-18 PROBLEM — O21.9 NAUSEA AND VOMITING IN PREGNANCY: Status: ACTIVE | Noted: 2020-12-18

## 2020-12-18 PROBLEM — E66.812 CLASS 2 OBESITY DUE TO EXCESS CALORIES WITHOUT SERIOUS COMORBIDITY WITH BODY MASS INDEX (BMI) OF 36.0 TO 36.9 IN ADULT: Status: RESOLVED | Noted: 2020-03-17 | Resolved: 2020-12-18

## 2020-12-18 PROBLEM — Z87.898 HISTORY OF HEADACHE: Status: ACTIVE | Noted: 2020-12-18

## 2020-12-18 PROBLEM — Z83.3 FAMILY HISTORY OF DIABETES MELLITUS IN MOTHER: Status: ACTIVE | Noted: 2020-12-18

## 2020-12-18 LAB
-: ABNORMAL
AMORPHOUS: ABNORMAL
AMPHETAMINE SCREEN URINE: NEGATIVE
BACTERIA: ABNORMAL
BARBITURATE SCREEN URINE: NEGATIVE
BENZODIAZEPINE SCREEN, URINE: NEGATIVE
BILIRUBIN URINE: NEGATIVE
BUPRENORPHINE URINE: NORMAL
CANNABINOID SCREEN URINE: NEGATIVE
CASTS UA: ABNORMAL /LPF (ref 0–2)
COCAINE METABOLITE, URINE: NEGATIVE
COLOR: YELLOW
COMMENT UA: ABNORMAL
CONTROL: PRESENT
CRYSTALS, UA: ABNORMAL /HPF
DIRECT EXAM: NORMAL
EPITHELIAL CELLS UA: ABNORMAL /HPF (ref 0–5)
GLUCOSE URINE: NEGATIVE
KETONES, URINE: NEGATIVE
LEUKOCYTE ESTERASE, URINE: ABNORMAL
Lab: NORMAL
MDMA URINE: NORMAL
METHADONE SCREEN, URINE: NEGATIVE
METHAMPHETAMINE, URINE: NORMAL
MUCUS: ABNORMAL
NITRITE, URINE: NEGATIVE
OPIATES, URINE: NEGATIVE
OTHER OBSERVATIONS UA: ABNORMAL
OXYCODONE SCREEN URINE: NEGATIVE
PH UA: 6.5 (ref 5–8)
PHENCYCLIDINE, URINE: NEGATIVE
PREGNANCY TEST URINE, POC: POSITIVE
PROPOXYPHENE, URINE: NORMAL
PROTEIN UA: NEGATIVE
RBC UA: ABNORMAL /HPF (ref 0–2)
RENAL EPITHELIAL, UA: ABNORMAL /HPF
SPECIFIC GRAVITY UA: 1 (ref 1–1.03)
SPECIMEN DESCRIPTION: NORMAL
TEST INFORMATION: NORMAL
TRICHOMONAS: ABNORMAL
TRICYCLIC ANTIDEPRESSANTS, UR: NORMAL
TURBIDITY: CLEAR
URINE HGB: NEGATIVE
UROBILINOGEN, URINE: NORMAL
WBC UA: ABNORMAL /HPF (ref 0–5)
YEAST: ABNORMAL

## 2020-12-18 PROCEDURE — 81025 URINE PREGNANCY TEST: CPT | Performed by: OBSTETRICS & GYNECOLOGY

## 2020-12-18 PROCEDURE — 99203 OFFICE O/P NEW LOW 30 MIN: CPT | Performed by: OBSTETRICS & GYNECOLOGY

## 2020-12-18 RX ORDER — PYRIDOXINE HCL (VITAMIN B6) 25 MG
25 TABLET ORAL 2 TIMES DAILY
Qty: 60 TABLET | Refills: 1 | Status: SHIPPED | OUTPATIENT
Start: 2020-12-18

## 2020-12-18 SDOH — HEALTH STABILITY: MENTAL HEALTH: HOW MANY STANDARD DRINKS CONTAINING ALCOHOL DO YOU HAVE ON A TYPICAL DAY?: NOT ASKED

## 2020-12-18 SDOH — HEALTH STABILITY: MENTAL HEALTH: HOW OFTEN DO YOU HAVE A DRINK CONTAINING ALCOHOL?: NOT ASKED

## 2020-12-18 NOTE — PROGRESS NOTES
[facility] PHYSICIANS  [facility] - [address] [facility]  Dept: [phone]  2020     Laura Pascal is a 23 y.o.  at 13w4d by LMP. Patient's last menstrual period was 2020. She denies h/o asthma, thyroid disease, HTN, DM, anxiety or depression. Denies h/o STDs or abnormal pap smear. Last pap NILM on 20. Reports that she had the chicken pox vaccine previously. She is already taking a prenatal vitamin. Denies cramping, vaginal bleeding or discharge. Her  was recently COVID positive on 20 (with no taste starting on 20). She denies any fevers/chills, SOB, cough, sore throat, myalgias, loss of taste/smell. Admits to some n/v in pregnancy, but admits it has been improving. She has h/o headaches outside of pregnancy and has had a few for which she has tried tylenol. Planned/unplanned:  \"Sort of\" planned, FOB Wynne Hammans,   BREANNA:  Estimated Date of Delivery: None noted. COMPLICATIONS CONCERNS:  Family h/o DM (pt's mother), maternal obesity (BMI 40), n/v in pregnancy, headaches  PRENATAL HISTORY: n/a    Blood pressure 133/89, pulse 87, height 5' 7\" (1.702 m), weight 237 lb 6.4 oz (107.7 kg), last menstrual period 2020, not currently breastfeeding. History reviewed. No pertinent past medical history.   Past Surgical History:   Procedure Laterality Date    WISDOM TOOTH EXTRACTION       Social History     Socioeconomic History    Marital status: Single     Spouse name: Not on file    Number of children: Not on file    Years of education: Not on file    Highest education level: Not on file   Occupational History    Not on file   Social Needs    Financial resource strain: Not on file    Food insecurity     Worry: Not on file     Inability: Not on file    Transportation needs     Medical: Not on file     Non-medical: Not on file   Tobacco Use    Smoking status: Never Smoker    Smokeless tobacco: Never Used   Substance and Sexual Activity    Alcohol use: Not Currently    Drug use: No    Sexual activity: Yes     Partners: Male     Birth control/protection: Condom   Lifestyle    Physical activity     Days per week: Not on file     Minutes per session: Not on file    Stress: Not on file   Relationships    Social connections     Talks on phone: Not on file     Gets together: Not on file     Attends [employer] service: Not on file     Active member of club or organization: Not on file     Attends meetings of clubs or organizations: Not on file     Relationship status: Not on file    Intimate partner violence     Fear of current or ex partner: Not on file     Emotionally abused: Not on file     Physically abused: Not on file     Forced sexual activity: Not on file   Other Topics Concern    Not on file   Social History Narrative    Not on file     Allergies   Allergen Reactions    Bee Venom Swelling     Family History   Problem Relation Age of Onset    Diabetes type 2  Mother     Breast Cancer Neg Hx     Colon Cancer Neg Hx     Ovarian Cancer Neg Hx     Uterine Cancer Neg Hx        ROS:  Constitutional:  Denies fever or chills, fatigue  Eyes:  Denies change in visual acuity, blurred vision, itching  HENT:  Denies nasal congestion or sore throat   Respiratory:  Denies cough or shortness of breath, difficulty breathing  Cardiovascular:  Denies chest pain or edema  GI:  Denies abdominal pain, nausea, vomiting, bloody stools or diarrhea   :  Denies dysuria, frequency, urgency  Musculoskeletal:  Denies back pain or joint pain   Integument:  Denies rash, itching, dryness  Neurologic:  Denies headache, focal weakness or sensory changes   Endocrine:  Denies polyuria or polydipsia,    Lymphatic:  Denies swollen glands,   Psychiatric:  Denies depression or anxiety       Physical findings: HEENT - Perrla, Eomi  Neck- Supple, no bruits  Lungs - Clear to auscultation.   CV- Regular rate and rythym,   Abdomen - Non tender, non distended, no masses  Extremities - no weakness, no calf pain, no edema, no posterior tibial pain  Pelvis - No external lesions or erythema, vaginal mucosa pink and moist, no blood or discharge in the vault, cervix visually closed without lesions or erythema, no cervical motion tenderness, no bladder tenderness, no adnexal tenderness or masses appreciated, uterus about 12 weeks size        Assessment/Plan:  Patient Active Problem List   Diagnosis    Fam H/O DM (pt's mom)    Maternal obesity (BMI 37)    First pregnancy    Nausea and vomiting in pregnancy    H/O headaches    Influenza vaccine declined        Diagnosis Orders   1. Encounter to establish care     2. Amenorrhea  HIV Screen    Prenatal Profile    Urinalysis    Urine Drug Screen    POCT urine pregnancy    VAGINITIS DNA PROBE    Culture, Urine    Chlamydia Trachomatis & Neisseria gonorrhoeae (GC) by amplified detection    US OB LESS THAN 14 WEEKS SINGLE OR FIRST GESTATION    US OB TRANSVAGINAL    Glucose Tolerance, 1 Hr    Cystic Fibrosis   3. Positive pregnancy test  HIV Screen    Prenatal Profile    Urinalysis    Urine Drug Screen    POCT urine pregnancy    VAGINITIS DNA PROBE    Culture, Urine    Chlamydia Trachomatis & Neisseria gonorrhoeae (GC) by amplified detection    US OB LESS THAN 14 WEEKS SINGLE OR FIRST GESTATION    US OB TRANSVAGINAL    Glucose Tolerance, 1 Hr    Cystic Fibrosis   4. Family history of diabetes mellitus in mother  Glucose Tolerance, 1 Hr   5. BMI 37.0-37.9, adult  Glucose Tolerance, 1 Hr   6. Primigravida, antepartum     7. History of headache     8. Nausea and vomiting in pregnancy     9. Influenza vaccine refused         Pap smear due for cytology 8/2023. Vaginal cultures collected and sent. Routine prenatal labs, early 1h GTT and dating/viability scan ordered. Will recommend daily ASA 81mg once gestational age is confirmed (to start after 12 wks) for preE prophylaxis.     Reviewed BRAT diet as well as eating small, frequent and bland meals due to n/v in pregnancy. Recommended otc ebenezer supplementation and sea bands. Rx doxylamine and pyridoxine given. Discussed tums if any GERD symptoms. Patient to call or come in if unable to keep down any food/water or if she notes consistent weight loss. Discussed otc methods safe in pregnancy for treating headaches. Recommended 1000mg tylenol q6h prn. Reviewed s/s requiring call to the office or presentation to the ED. Reviewed recommendation for influenza vaccine in pregnancy. Patient vocalized understanding and declines flu vaccine. Declined genetic testing. Discussed in great detail the growing concerns and associated hospital/institutional plans concerning COVID-19. Reviewed current available evidence and recommendations concerning outpatient and inpatient antepartum care in light of the pandemic. Discussed specific available CDC/ACOG/SMFM advisories including utilization of telehealth and limited inpatient admissions when able. Encouraged social distancing and appropriate hand washing/hygiene practices. Reviewed symptoms suspicious for infection. All questions answered to the best of my ability. Patient vocalized understanding. Return in about 4 weeks (around 1/15/2021) for OB Hx.     Aga El DO   [facility] Jaqueline Scales  12/18/2020 12:39 PM

## 2020-12-19 LAB
CULTURE: NO GROWTH
Lab: NORMAL
SPECIMEN DESCRIPTION: NORMAL

## 2020-12-21 ENCOUNTER — HOSPITAL ENCOUNTER (OUTPATIENT)
Dept: ULTRASOUND IMAGING | Age: 20
Discharge: HOME | End: 2020-12-23
Payer: COMMERCIAL

## 2020-12-21 PROCEDURE — 76801 OB US < 14 WKS SINGLE FETUS: CPT

## 2020-12-21 RX ORDER — ASPIRIN 81 MG/1
81 TABLET ORAL DAILY
Qty: 30 TABLET | Refills: 3 | Status: SHIPPED | OUTPATIENT
Start: 2020-12-21 | End: 2021-04-23

## 2020-12-22 LAB
C TRACH DNA GENITAL QL NAA+PROBE: NEGATIVE
N. GONORRHOEAE DNA: NEGATIVE
SPECIMEN DESCRIPTION: NORMAL

## 2021-01-11 ENCOUNTER — HOSPITAL ENCOUNTER (OUTPATIENT)
Age: 21
Discharge: HOME | End: 2021-01-11
Payer: COMMERCIAL

## 2021-01-11 DIAGNOSIS — Z83.3 FAMILY HISTORY OF DIABETES MELLITUS IN MOTHER: ICD-10-CM

## 2021-01-11 DIAGNOSIS — Z32.01 POSITIVE PREGNANCY TEST: ICD-10-CM

## 2021-01-11 DIAGNOSIS — N91.2 AMENORRHEA: ICD-10-CM

## 2021-01-11 PROBLEM — O26.899 RH NEGATIVE STATE IN ANTEPARTUM PERIOD: Status: ACTIVE | Noted: 2021-01-11

## 2021-01-11 PROBLEM — Z67.91 RH NEGATIVE STATE IN ANTEPARTUM PERIOD: Status: ACTIVE | Noted: 2021-01-11

## 2021-01-11 LAB
ABO/RH: NORMAL
ABSOLUTE EOS #: 0.13 K/UL (ref 0–0.44)
ABSOLUTE IMMATURE GRANULOCYTE: 0.08 K/UL (ref 0–0.3)
ABSOLUTE LYMPH #: 1.74 K/UL (ref 1.1–3.7)
ABSOLUTE MONO #: 0.82 K/UL (ref 0.1–1.2)
ANTIBODY SCREEN: NEGATIVE
BASOPHILS # BLD: 0 % (ref 0–2)
BASOPHILS ABSOLUTE: 0.03 K/UL (ref 0–0.2)
DIFFERENTIAL TYPE: ABNORMAL
EOSINOPHILS RELATIVE PERCENT: 1 % (ref 1–4)
GLUCOSE ADMINISTRATION: NORMAL
GLUCOSE TOLERANCE SCREEN 50G: 107 MG/DL (ref 70–135)
HCT VFR BLD CALC: 38.3 % (ref 36.3–47.1)
HEMOGLOBIN: 12.2 G/DL (ref 11.9–15.1)
HEPATITIS B SURFACE ANTIGEN: NONREACTIVE
HIV AG/AB: NONREACTIVE
IMMATURE GRANULOCYTES: 1 %
LYMPHOCYTES # BLD: 11 % (ref 24–43)
MCH RBC QN AUTO: 27.2 PG (ref 25.2–33.5)
MCHC RBC AUTO-ENTMCNC: 31.9 G/DL (ref 28.4–34.8)
MCV RBC AUTO: 85.3 FL (ref 82.6–102.9)
MONOCYTES # BLD: 5 % (ref 3–12)
NRBC AUTOMATED: 0 PER 100 WBC
PDW BLD-RTO: 14.2 % (ref 11.8–14.4)
PLATELET # BLD: 271 K/UL (ref 138–453)
PLATELET ESTIMATE: ABNORMAL
PMV BLD AUTO: 11.9 FL (ref 8.1–13.5)
RBC # BLD: 4.49 M/UL (ref 3.95–5.11)
RBC # BLD: ABNORMAL 10*6/UL
RUBV IGG SER QL: >500 IU/ML
SEG NEUTROPHILS: 82 % (ref 36–65)
SEGMENTED NEUTROPHILS ABSOLUTE COUNT: 13.24 K/UL (ref 1.5–8.1)
T. PALLIDUM, IGG: NONREACTIVE
WBC # BLD: 16 K/UL (ref 3.5–11.3)
WBC # BLD: ABNORMAL 10*3/UL

## 2021-01-11 PROCEDURE — 86901 BLOOD TYPING SEROLOGIC RH(D): CPT

## 2021-01-11 PROCEDURE — 86900 BLOOD TYPING SEROLOGIC ABO: CPT

## 2021-01-11 PROCEDURE — 87340 HEPATITIS B SURFACE AG IA: CPT

## 2021-01-11 PROCEDURE — 82950 GLUCOSE TEST: CPT

## 2021-01-11 PROCEDURE — 87389 HIV-1 AG W/HIV-1&-2 AB AG IA: CPT

## 2021-01-11 PROCEDURE — 86850 RBC ANTIBODY SCREEN: CPT

## 2021-01-11 PROCEDURE — 36415 COLL VENOUS BLD VENIPUNCTURE: CPT

## 2021-01-11 PROCEDURE — 81220 CFTR GENE COM VARIANTS: CPT

## 2021-01-11 PROCEDURE — 86762 RUBELLA ANTIBODY: CPT

## 2021-01-11 PROCEDURE — 85025 COMPLETE CBC W/AUTO DIFF WBC: CPT

## 2021-01-11 PROCEDURE — 86780 TREPONEMA PALLIDUM: CPT

## 2021-01-22 ENCOUNTER — INITIAL PRENATAL (OUTPATIENT)
Dept: OBGYN CLINIC | Age: 21
End: 2021-01-22

## 2021-01-22 VITALS
BODY MASS INDEX: 37.87 KG/M2 | WEIGHT: 241.8 LBS | SYSTOLIC BLOOD PRESSURE: 126 MMHG | HEART RATE: 114 BPM | DIASTOLIC BLOOD PRESSURE: 87 MMHG

## 2021-01-22 DIAGNOSIS — O26.899 RH NEGATIVE STATE IN ANTEPARTUM PERIOD: ICD-10-CM

## 2021-01-22 DIAGNOSIS — Z83.3 FAMILY HISTORY OF DIABETES MELLITUS IN MOTHER: ICD-10-CM

## 2021-01-22 DIAGNOSIS — Z3A.18 18 WEEKS GESTATION OF PREGNANCY: ICD-10-CM

## 2021-01-22 DIAGNOSIS — Z67.91 RH NEGATIVE STATE IN ANTEPARTUM PERIOD: ICD-10-CM

## 2021-01-22 DIAGNOSIS — Z34.92 PRENATAL CARE IN SECOND TRIMESTER: Primary | ICD-10-CM

## 2021-01-22 PROBLEM — Z87.42 HISTORY OF HEAVY PERIODS: Status: ACTIVE | Noted: 2021-01-22

## 2021-01-22 PROCEDURE — 0501F PRENATAL FLOW SHEET: CPT | Performed by: OBSTETRICS & GYNECOLOGY

## 2021-01-22 NOTE — PROGRESS NOTES
Patient Active Problem List   Diagnosis    Fam H/O DM (pt's mom)    Maternal obesity (BMI 40)    First pregnancy    Nausea and vomiting in pregnancy    H/O headaches    Influenza vaccine declined    Rh negative state    Fetal adipex exposure    History of heavy periods     Blood pressure 126/87, pulse 114, weight 241 lb 12.8 oz (109.7 kg), last menstrual period 2020, not currently breastfeeding. Sonia Edmond is a 19 y.o.  at 18w4d, here for her ACOG. The patients past medical, surgical, social and family history were reviewed. Current medications and allergies were reviewed, and documented in the chart. -LOF, -VB, -abdominal pain. Has felt \"gas bubbles\" that might be fetal movement. She denies any fevers/chills, SOB, cough, sore throat, myalgias, n/v, loss of taste/smell or sick contacts. She is taking her daily aspirin.      Menstrual history: menarche at 9yo, regular periods, last 5-6 days, described as heavy  Birth control: OCPs in the past (acne and weight gain)    Wt Readings from Last 3 Encounters:   19 241 lb 12.8 oz (109.7 kg)   20 237 lb 6.4 oz (107.7 kg)   10/20/20 232 lb 3.2 oz (105.3 kg)     Recent Results (from the past 8736 hour(s))   POCT urine pregnancy    Collection Time: 20 11:55 AM   Result Value Ref Range    Preg Test, Ur positive     Control present    Urinalysis    Collection Time: 20  9:39 PM   Result Value Ref Range    Color, UA YELLOW YELLOW    Turbidity UA CLEAR CLEAR    Glucose, Ur NEGATIVE NEGATIVE    Bilirubin Urine NEGATIVE NEGATIVE    Ketones, Urine NEGATIVE NEGATIVE    Specific Gravity, UA 1.003 (L) 1.005 - 1.030    Urine Hgb NEGATIVE NEGATIVE    pH, UA 6.5 5.0 - 8.0    Protein, UA NEGATIVE NEGATIVE    Urobilinogen, Urine Normal Normal    Nitrite, Urine NEGATIVE NEGATIVE    Leukocyte Esterase, Urine SMALL (A) NEGATIVE    Urinalysis Comments NOT REPORTED    Urine Drug Screen    Collection Time: 20  9:39 PM Result Value Ref Range    Amphetamine Screen, Ur NEGATIVE NEGATIVE    Barbiturate Screen, Ur NEGATIVE NEGATIVE    Benzodiazepine Screen, Urine NEGATIVE NEGATIVE    Cocaine Metabolite, Urine NEGATIVE NEGATIVE    Methadone Screen, Urine NEGATIVE NEGATIVE    Opiates, Urine NEGATIVE NEGATIVE    Phencyclidine, Urine NEGATIVE NEGATIVE    Propoxyphene, Urine NOT REPORTED NEGATIVE    Cannabinoid Scrn, Ur NEGATIVE NEGATIVE    Oxycodone Screen, Ur NEGATIVE NEGATIVE    Methamphetamine, Urine NOT REPORTED NEGATIVE    Tricyclic Antidepressants, Urine NOT REPORTED NEGATIVE    MDMA, Urine NOT REPORTED NEGATIVE    Buprenorphine Urine NOT REPORTED NEGATIVE    Test Information       Assay provides medical screening only. The absence of expected drug(s) and/or metabolite(s) may indicate diluted or adulterated urine, limitations of testing or timing of collection. Microscopic Urinalysis    Collection Time: 12/18/20  9:39 PM   Result Value Ref Range    -          WBC, UA 0 TO 2 0 - 5 /HPF    RBC, UA None 0 - 2 /HPF    Casts UA NOT REPORTED 0 - 2 /LPF    Crystals, UA NOT REPORTED None /HPF    Epithelial Cells UA 2 TO 5 0 - 5 /HPF    Renal Epithelial, UA NOT REPORTED 0 /HPF    Bacteria, UA FEW (A) None    Mucus, UA NOT REPORTED None    Trichomonas, UA NOT REPORTED None    Amorphous, UA NOT REPORTED None    Other Observations UA NOT REPORTED NOT REQ. Yeast, UA NOT REPORTED None   Culture, Urine    Collection Time: 12/18/20  9:40 PM    Specimen: Urine, clean catch   Result Value Ref Range    Specimen Description . CLEAN CATCH URINE     Special Requests NOT REPORTED     Culture NO GROWTH    VAGINITIS DNA PROBE    Collection Time: 12/18/20  9:40 PM    Specimen: Vaginal   Result Value Ref Range    Specimen Description . VAGINA     Special Requests NOT REPORTED     Direct Exam NEGATIVE for Trichomonas vaginalis     Direct Exam NEGATIVE for Gardnerella vaginalis     Direct Exam NEGATIVE for Candida sp.      Direct Exam       Method of 01/11/21  1:40 PM   Result Value Ref Range    ABO/Rh A NEGATIVE     Antibody Screen NEGATIVE        No past medical history on file. Past Surgical History:   Procedure Laterality Date    WISDOM TOOTH EXTRACTION       Family History   Problem Relation Age of Onset    Diabetes type 2  Mother     Breast Cancer Neg Hx     Colon Cancer Neg Hx     Ovarian Cancer Neg Hx     Uterine Cancer Neg Hx      Social History     Tobacco Use   Smoking Status Never Smoker   Smokeless Tobacco Never Used     Social History     Substance and Sexual Activity   Alcohol Use Not Currently       MEDICATIONS:  Current Outpatient Medications   Medication Sig Dispense Refill    Prenatal Vit-Fe Fumarate-FA (PRENATAL PO) Take by mouth      aspirin EC 81 MG EC tablet Take 1 tablet by mouth daily 30 tablet 3    vitamin B-6 (B-6) 25 MG tablet Take 1 tablet by mouth 2 times daily 60 tablet 1    doxyLAMINE succinate (GNP SLEEP AID) 25 MG tablet Take 1 tablet by mouth nightly 30 tablet 0    triamcinolone (KENALOG) 0.025 % cream Apply Topically (Patient not taking: Reported on 12/18/2020) 1 Tube 0    EPINEPHrine (EPIPEN) 0.3 MG/0.3ML SOAJ injection Inject 0.3 mLs into the muscle once for 1 dose Use as directed for allergic reaction 1 each 0     No current facility-administered medications for this visit. ALLERGIES:  Bee venom    Reviewed global and practice OB care including nausea measures, nutrition, activities, warning signs, and contact information.  Offered cell free DNA screen,NT echo and WIC .    --------------------------------------------------------------------------  Genetic Screening/Teratology Counseling  (Include patient, FOB or anyone in either family)    1) Patient's age 28 years or > at BREANNA: No  2) Thalassemia (Mediterranean, ): No  3) Neural Tube Defect:   No  4) Congenital heart defect:   No  5) Trisomy (e.g. Down Syndrome):  No  6) David-sachs (Christianity, were also reviewed. Increases in cancer, respiratory problems, and sudden infant death syndrome were reviewed as well. The patient was informed of a 2-4% risk of congenital anomalies in the general population. She was also informed that karyotyping is the only way to evaluate the fetus for genetic problems and genetic lethal anomalies. Chorionic villous sampling, amniocentesis and Maternal Genetic Blood Sampling-(NIPT Testing) were also discussed with morbidity rates in detail. She declined any of the options. Route of delivery and counseling on vaginal, operative vaginal, and  sections were completed with the risks of each to both the patient as well as her baby. The possibility of a blood transfusion was discussed as well. The patient was not opposed to receiving a transfusion if needed. Nuchal translucency/Quad Evaluation and MSAFP single marker testing was reviewed in detail with attention to timing of testing and their windows. For patients beyond the gestational age for Nuchal translucency evaluation Quad testing was recommended. Timing for the Quad test was reviewed. Benefits of the above testing was reviewed. A second trimester amniocentesis was also made available to the patient. Risks, Benefits and non-invasive alternative testing was reviewed. The patient was questioned in detail regarding any genetic misnomer history, chromosomal abnormalities, or learning disabilities in  herself, the father of the baby or their families. SHE DENIED ANY HISTORY AS STATED ABOVE: Yes    Reviewed Rh negative status and need for rhogam at 28wks and possibly postpartum. Genetic testing declined. Fetal exposure to adipex for 1-2 weeks in October. MEM referral placed. Discussed updated COVID precautions and policies, including but not limited to outpatient testing 3-4 days prior to scheduled delivery or universal rapid screening on L&D for unscheduled delivery (unless previously positive).

## 2021-01-22 NOTE — PATIENT INSTRUCTIONS
to help with muscle ache. Prevent leg cramps  · Be sure to get enough calcium. If you are worried that you are not getting enough, talk to your doctor. · Exercise every day, and stretch your legs before bed. · Take a warm bath before bed, and try leg warmers at night. Where can you learn more? Go to https://chpecarolynewareli.[email or web address] org and sign in to your [employer] account. Enter H822 in the [employer] box to learn more about \"Weeks 18 to 22 of Your Pregnancy: Care Instructions. \"     If you do not have an account, please click on the \"Sign Up Now\" link. Current as of: February 11, 2020               Content Version: 12.6  © 7209-6543 [employer]. Care instructions adapted under license by [facility] ([facility]). If you have questions about a medical condition or this instruction, always ask your healthcare professional. [address] any warranty or liability for your use of this information.

## 2021-01-27 LAB — CYSTIC FIBROSIS: NORMAL

## 2021-02-18 ENCOUNTER — ROUTINE PRENATAL (OUTPATIENT)
Dept: PERINATAL CARE | Age: 21
End: 2021-02-18
Payer: COMMERCIAL

## 2021-02-18 ENCOUNTER — HOSPITAL ENCOUNTER (OUTPATIENT)
Age: 21
Discharge: HOME | End: 2021-02-18
Payer: COMMERCIAL

## 2021-02-18 VITALS
TEMPERATURE: 97.2 F | WEIGHT: 250 LBS | RESPIRATION RATE: 16 BRPM | HEART RATE: 120 BPM | DIASTOLIC BLOOD PRESSURE: 78 MMHG | BODY MASS INDEX: 39.24 KG/M2 | HEIGHT: 67 IN | SYSTOLIC BLOOD PRESSURE: 120 MMHG

## 2021-02-18 DIAGNOSIS — O35.BXX0 FETAL CARDIAC ECHOGENIC FOCUS, ANTEPARTUM, SINGLE OR UNSPECIFIED FETUS: Primary | ICD-10-CM

## 2021-02-18 DIAGNOSIS — Z3A.22 22 WEEKS GESTATION OF PREGNANCY: ICD-10-CM

## 2021-02-18 DIAGNOSIS — O99.212 OBESITY AFFECTING PREGNANCY IN SECOND TRIMESTER: ICD-10-CM

## 2021-02-18 DIAGNOSIS — Z36.86 SCREENING, ANTENATAL, FOR RISK OF PRE-TERM LABOR: ICD-10-CM

## 2021-02-18 DIAGNOSIS — O35.8XX0 SUSPECTED DAMAGE TO FETUS FROM DISEASE IN MOTHER, ANTEPARTUM CONDITION, SINGLE OR UNSPECIFIED FETUS: ICD-10-CM

## 2021-02-18 PROCEDURE — 76811 OB US DETAILED SNGL FETUS: CPT | Performed by: OBSTETRICS & GYNECOLOGY

## 2021-02-18 PROCEDURE — 76817 TRANSVAGINAL US OBSTETRIC: CPT | Performed by: OBSTETRICS & GYNECOLOGY

## 2021-02-18 PROCEDURE — 82105 ALPHA-FETOPROTEIN SERUM: CPT

## 2021-02-18 PROCEDURE — 99243 OFF/OP CNSLTJ NEW/EST LOW 30: CPT | Performed by: OBSTETRICS & GYNECOLOGY

## 2021-02-18 PROCEDURE — 36415 COLL VENOUS BLD VENIPUNCTURE: CPT

## 2021-02-19 LAB
SEND OUT REPORT: NORMAL
TEST NAME: NORMAL

## 2021-02-20 LAB
AFP INTERPRETATION: NORMAL
AFP MOM: 1.43
AFP SPECIMEN: NORMAL
AFP: 84 NG/ML
DATE OF BIRTH: NORMAL
DATING METHOD: NORMAL
DETERMINED BY: NORMAL
DIABETIC: NEGATIVE
DONOR EGG?: NORMAL
DUE DATE: NORMAL
ESTIMATED DUE DATE: NORMAL
FAMILY HISTORY NTD: NEGATIVE
GESTATIONAL AGE: NORMAL
IN VITRO FERTILIZATION: NORMAL
INSULIN REQ DIABETES: NO
LAST MENSTRUAL PERIOD: NORMAL
MATERNAL AGE AT EDD: 23.4 YR
MATERNAL WEIGHT: 250
MONOCHORIONIC TWINS: NORMAL
NUMBER OF FETUSES: NORMAL
PATIENT WEIGHT UNITS: NORMAL
PATIENT WEIGHT: NORMAL
RACE (MATERNAL): NORMAL
RACE: NORMAL
REPEAT SPECIMEN?: NORMAL
SMOKING: NORMAL
SMOKING: NORMAL
VALPROIC/CARBAMAZEP: NORMAL
ZZ NTE CLEAN UP: HISTORY: NO

## 2021-02-24 ENCOUNTER — ROUTINE PRENATAL (OUTPATIENT)
Dept: OBGYN CLINIC | Age: 21
End: 2021-02-24

## 2021-02-24 VITALS
SYSTOLIC BLOOD PRESSURE: 134 MMHG | WEIGHT: 250.8 LBS | BODY MASS INDEX: 39.28 KG/M2 | DIASTOLIC BLOOD PRESSURE: 80 MMHG | HEART RATE: 98 BPM

## 2021-02-24 DIAGNOSIS — Z34.92 PRENATAL CARE IN SECOND TRIMESTER: Primary | ICD-10-CM

## 2021-02-24 DIAGNOSIS — Z83.3 FAMILY HISTORY OF DIABETES MELLITUS IN MOTHER: ICD-10-CM

## 2021-02-24 DIAGNOSIS — Z67.91 RH NEGATIVE STATE IN ANTEPARTUM PERIOD: ICD-10-CM

## 2021-02-24 DIAGNOSIS — Z3A.23 23 WEEKS GESTATION OF PREGNANCY: ICD-10-CM

## 2021-02-24 DIAGNOSIS — O26.899 RH NEGATIVE STATE IN ANTEPARTUM PERIOD: ICD-10-CM

## 2021-02-24 PROCEDURE — 0502F SUBSEQUENT PRENATAL CARE: CPT | Performed by: OBSTETRICS & GYNECOLOGY

## 2021-02-24 NOTE — PROGRESS NOTES
prior to scheduled delivery or universal rapid screening on L&D for unscheduled delivery (unless previously positive). Reviewed limited staff and no visitors if symptomatic and COVID positive. Reviewed that one asymptomatic support person may be present if patient is COVID positive and asymptomatic. Discussed that two support people are allowed when COVID negative. Encouraged social distancing and appropriate hand washing/hygiene practices. Reviewed symptoms suspicious for COVID infection. Discussed that ACOG, SMFM, and the CDC recommend to not withold immunization in pregnant and breastfeeding women who meet criteria for receipt of the vaccine based on the ACIP recommended priority groups. All questions answered. Patient vocalized understanding. Patient Active Problem List    Diagnosis Date Noted    Fetal adipex exposure 01/22/2021     For 1-2 weeks in October      History of heavy periods 01/22/2021     OCPs in the past caused worsening acne and weight gain      Rh negative state 01/11/2021     Will need rhogam      Fam H/O DM (pt's mom) 12/18/2020     Early 1h       Maternal obesity (BMI 37) 12/18/2020     Early 1h       First pregnancy 12/18/2020     Declined genetic screening  Will need ASA 81mg daily for PreE prophylaxis after 12 wks      Nausea and vomiting in pregnancy 12/18/2020    H/O headaches 12/18/2020     Usually treats with advil outside of pregnancy      Influenza vaccine declined 12/18/2020 12/18/20       Return in about 4 weeks (around 3/24/2021) for DEJAN. The patient was instructed on fetal kick counts and was given a kick sheet to complete every 8 hours. This is to begin at 28 weeks gestation. She was instructed that the baby should move at a minimum of ten times within one hour after a meal. The patient was instructed to lay down on her left side twenty minutes after eating and count movements for up to one hour with a target value of ten movements.     She was instructed to notify the office if she did not make that target after two attempts or if after any attempt there was less than four movements.     Anna Moya,    [facility] [place]  2/24/2021 10:17 AM

## 2021-03-17 ENCOUNTER — TELEPHONE (OUTPATIENT)
Dept: PERINATAL CARE | Age: 21
End: 2021-03-17

## 2021-03-17 NOTE — TELEPHONE ENCOUNTER
Patient aware of NL cf DNA fetal trisomies 13,18,21, Monosomy X. 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 Declines invasive testing, opts for partner test, Yordy PERKINS 1999. Partner materials prepared for patient to collect.

## 2021-03-23 PROBLEM — Z14.8 GENETIC CARRIER STATUS: Status: ACTIVE | Noted: 2021-03-23

## 2021-03-30 ENCOUNTER — HOSPITAL ENCOUNTER (OUTPATIENT)
Age: 21
Discharge: HOME | End: 2021-03-30
Payer: COMMERCIAL

## 2021-03-30 DIAGNOSIS — Z3A.23 23 WEEKS GESTATION OF PREGNANCY: ICD-10-CM

## 2021-03-30 LAB
ABO/RH: NORMAL
ABSOLUTE EOS #: 0.14 K/UL (ref 0–0.44)
ABSOLUTE IMMATURE GRANULOCYTE: 0.14 K/UL (ref 0–0.3)
ABSOLUTE LYMPH #: 1.59 K/UL (ref 1.1–3.7)
ABSOLUTE MONO #: 0.94 K/UL (ref 0.1–1.2)
ANTIBODY SCREEN: NEGATIVE
BASOPHILS # BLD: 0 % (ref 0–2)
BASOPHILS ABSOLUTE: 0.05 K/UL (ref 0–0.2)
DIFFERENTIAL TYPE: ABNORMAL
EOSINOPHILS RELATIVE PERCENT: 1 % (ref 1–4)
GLUCOSE ADMINISTRATION: NORMAL
GLUCOSE TOLERANCE SCREEN 50G: 128 MG/DL (ref 70–135)
HCT VFR BLD CALC: 34.6 % (ref 36.3–47.1)
HEMOGLOBIN: 10.8 G/DL (ref 11.9–15.1)
IMMATURE GRANULOCYTES: 1 %
LYMPHOCYTES # BLD: 10 % (ref 24–43)
MCH RBC QN AUTO: 26.6 PG (ref 25.2–33.5)
MCHC RBC AUTO-ENTMCNC: 31.2 G/DL (ref 28.4–34.8)
MCV RBC AUTO: 85.2 FL (ref 82.6–102.9)
MONOCYTES # BLD: 6 % (ref 3–12)
NRBC AUTOMATED: 0 PER 100 WBC
PDW BLD-RTO: 13 % (ref 11.8–14.4)
PLATELET # BLD: 255 K/UL (ref 138–453)
PLATELET ESTIMATE: ABNORMAL
PMV BLD AUTO: 11.1 FL (ref 8.1–13.5)
RBC # BLD: 4.06 M/UL (ref 3.95–5.11)
RBC # BLD: ABNORMAL 10*6/UL
SEG NEUTROPHILS: 82 % (ref 36–65)
SEGMENTED NEUTROPHILS ABSOLUTE COUNT: 12.92 K/UL (ref 1.5–8.1)
WBC # BLD: 15.8 K/UL (ref 3.5–11.3)
WBC # BLD: ABNORMAL 10*3/UL

## 2021-03-30 PROCEDURE — 85025 COMPLETE CBC W/AUTO DIFF WBC: CPT

## 2021-03-30 PROCEDURE — 82950 GLUCOSE TEST: CPT

## 2021-03-30 PROCEDURE — 86850 RBC ANTIBODY SCREEN: CPT

## 2021-03-30 PROCEDURE — 36415 COLL VENOUS BLD VENIPUNCTURE: CPT

## 2021-03-30 PROCEDURE — 86901 BLOOD TYPING SEROLOGIC RH(D): CPT

## 2021-03-30 PROCEDURE — 86900 BLOOD TYPING SEROLOGIC ABO: CPT

## 2021-03-31 ENCOUNTER — ROUTINE PRENATAL (OUTPATIENT)
Dept: OBGYN CLINIC | Age: 21
End: 2021-03-31
Payer: COMMERCIAL

## 2021-03-31 VITALS
BODY MASS INDEX: 39.63 KG/M2 | SYSTOLIC BLOOD PRESSURE: 123 MMHG | HEART RATE: 85 BPM | DIASTOLIC BLOOD PRESSURE: 77 MMHG | WEIGHT: 253 LBS

## 2021-03-31 DIAGNOSIS — Z34.93 PRENATAL CARE IN THIRD TRIMESTER: Primary | ICD-10-CM

## 2021-03-31 DIAGNOSIS — Z14.8 GENETIC CARRIER STATUS: ICD-10-CM

## 2021-03-31 DIAGNOSIS — Z67.91 RH NEGATIVE STATE IN ANTEPARTUM PERIOD: ICD-10-CM

## 2021-03-31 DIAGNOSIS — O26.899 RH NEGATIVE STATE IN ANTEPARTUM PERIOD: ICD-10-CM

## 2021-03-31 DIAGNOSIS — Z3A.28 28 WEEKS GESTATION OF PREGNANCY: ICD-10-CM

## 2021-03-31 PROCEDURE — 96372 THER/PROPH/DIAG INJ SC/IM: CPT | Performed by: OBSTETRICS & GYNECOLOGY

## 2021-03-31 PROCEDURE — 0502F SUBSEQUENT PRENATAL CARE: CPT | Performed by: OBSTETRICS & GYNECOLOGY

## 2021-03-31 NOTE — PATIENT INSTRUCTIONS
Patient Education        Weeks 26 to 30 of Your Pregnancy: Care Instructions  Overview     You are now entering your last trimester of pregnancy. Your baby is growing quickly. Lynnette Barajas probably feel your baby moving around more often. Your doctor may ask you to count your baby's kicks. Your back may ache as your body gets used to your baby's size and length. If you haven't already had the Tdap shot during this pregnancy, talk to your doctor about getting it. It will help protect your  against pertussis infection. During this time, it's important to take care of yourself and pay attention to what your body needs. If you feel sexual, you can explore ways to be close with your partner that match your comfort and desire. Follow-up care is a key part of your treatment and safety. Be sure to make and go to all appointments, and call your doctor if you are having problems. It's also a good idea to know your test results and keep a list of the medicines you take. How can you care for yourself at home? Take it easy at work  · Take frequent breaks. If possible, stop working when you are tired, and rest during your lunch hour. · Take bathroom breaks every 2 hours. · Change positions often. If you sit for long periods, stand up and walk around. · When you stand for a long time, keep one foot on a low stool with your knee bent. After standing a lot, sit with your feet up. · Avoid fumes, chemicals, and tobacco smoke. Be sexual in your own way  · Having sex during pregnancy is okay, unless your doctor tells you not to. · You may be very interested in sex, or you may have no interest at all. · Your growing belly can make it hard to find a good position during intercourse. [place] and explore. · You may get cramps in your uterus when your partner touches your breasts. · A back rub may relieve the backache or cramps that sometimes follow orgasm. Learn about  labor  · Watch for signs of  labor. You may be going into labor if:  ? You have menstrual-like cramps, with or without nausea. ? You have about 6 or more contractions in 1 hour, even after you have had a glass of water and are resting. ? You have a low, dull backache that does not go away when you change your position. ? You have pain or pressure in your pelvis that comes and goes in a pattern. ? You have intestinal cramping or flu-like symptoms, with or without diarrhea.  ? You notice an increase or change in your vaginal discharge. Discharge may be heavy, mucus-like, watery, or streaked with blood. ? Your water breaks. · If you think you have  labor:  ? Drink 2 or 3 glasses of water or juice. Not drinking enough fluids can cause contractions. ? Stop what you are doing, and empty your bladder. Then lie down on your left side for at least 1 hour. ? While lying on your side, find your breast bone. Put your fingers in the soft spot just below it. Move your fingers down toward your belly button to find the top of your uterus. Check to see if it is tight. ? Contractions can be weak or strong. Record your contractions for an hour. Time a contraction from the start of one contraction to the start of the next one.  ? Single or several strong contractions without a pattern are called Robbin-Serrano contractions. They are practice contractions but not the start of labor. They often stop if you change what you are doing. ? Call your doctor if you have regular contractions. Where can you learn more? Go to https://Geo [email or web address].healthTopLog. org and sign in to your [employer] account. Enter V695 in the [facility] box to learn more about \"Weeks 26 to 30 of Your Pregnancy: Care Instructions. \"     If you do not have an account, please click on the \"Sign Up Now\" link. Current as of: 2020               Content Version: 12.8  © 6655-0169 [employer]wise, Incorporated. Care instructions adapted under license by [facility] ([facility]).  If you have questions about a medical condition or this instruction, always ask your healthcare professional. [place] [address] any warranty or liability for your use of this information.

## 2021-03-31 NOTE — PROGRESS NOTES
Prenatal Visit    Abhinav Larios is a 19 y.o. female  at 34w4d    The patient was seen and evaluated. Reports positive fetal movements. She denies headache, vision changes, RUQ pain, contractions, vaginal bleeding and leakage of fluid. She denies any fevers/chills, SOB, cough, sore throat, myalgias, n/v, loss of taste/smell or sick contacts. She reports that she had another episode where she felt sweaty and anxious at her [facility] office. Denies having any other episodes outside of this. The patient was instructed on fetal kick counts and was given a kick sheet to complete every 8 hours. She was instructed that the baby should move at a minimum of ten times within one hour after a meal. The patient was instructed to lay down on her left side twenty minutes after eating and count movements for up to one hour with a target value of ten movements. She was instructed to notify the office if she did not make that target after two attempts or if after any attempt there was less than four movements. The patient reports that the targets have been made. The patient declined the influenza vaccine this year. The problem list reflects the active issues addressed during today's visit    Vitals:     BP: 123/77  Weight: 253 lb (114.8 kg)  Pulse: 85  Patient Position: Sitting  Fundal Height (cm): 28 cm  Fetal Heart Rate: 150  Movement: Present     28 Week Labs:  ABO/Rh   Date Value Ref Range Status   2021 A NEGATIVE  Final     1hr GTT: 128   28 week CBC:   Lab Results   Component Value Date    WBC 15.8 (H) 2021    HGB 10.8 (L) 2021    HCT 34.6 (L) 2021    MCV 85.2 2021     2021         Assessment & Plan:  Abhinav Larios is a 19 y.o. female  at 34w4d   - 31 week labs completed and reviewed. Discussed increased iron in diet. - Rhogam given. - Discussed recommendations for TDAP immunization, patient desires TDAP at next visit.    - Next [facility] appointment    -  labor and kick count precautions given. - Signs and symptoms of preeclampsia reviewed. - Discussed updated COVID precautions and policies, including but not limited to outpatient testing 3-4 days prior to scheduled delivery or universal rapid screening on L&D for unscheduled delivery (unless previously positive). Reviewed limited staff and no visitors if symptomatic and COVID positive. Reviewed that one asymptomatic support person may be present if patient is COVID positive and asymptomatic. Discussed that two support people are allowed when COVID negative. Encouraged social distancing and appropriate hand washing/hygiene practices. Reviewed symptoms suspicious for COVID infection. Discussed that ACOG, SMFM, and the CDC recommend to not withold immunization in pregnant and breastfeeding women who meet criteria for receipt of the vaccine based on the ACIP recommended priority groups. All questions answered. Patient vocalized understanding. Patient Active Problem List    Diagnosis Date Noted    SMA carrier status 2021     SMN1+ carrier  Opted for FOB testing      Fetal adipex exposure 2021     For 1-2 weeks in October      History of heavy periods 2021     OCPs in the past caused worsening acne and weight gain      Rh negative state 2021     Rhogam given 3/31/21        Fam H/O DM (pt's mom) 2020     Early 1h       Maternal obesity (BMI 37) 2020     Early 1h       First pregnancy 2020     Declined genetic screening  Will need ASA 81mg daily for PreE prophylaxis after 12 wks      Nausea and vomiting in pregnancy 2020    H/O headaches 2020     Usually treats with advil outside of pregnancy      Influenza vaccine declined 2020       Return in about 2 weeks (around 2021) for DEJAN. The patient was counseled on Labor & Delivery.    Route of delivery and counseling on vaginal, operative vaginal, and  sections were completed with the risks of each to both the patient as well as her baby. The possibility of a blood transfusion was discussed as well. The patient was not opposed to receiving a transfusion if needed. The patient was counseled on types of analgesia during labor. The patient has been instructed to call the office at anytime prior to going into the hospital so the on-call physician may direct her to the appropriate facility for care. Exceptions were reviewed including but not limited to: Decreased fetal movement, vaginal Bleeding or hemorrhage, trauma, readily expectant delivery, or any instance where she feels 911 should be utilized.     Fadia Moya, DO [facility]  3/31/2021 2:06 PM

## 2021-04-05 ENCOUNTER — ROUTINE PRENATAL (OUTPATIENT)
Dept: PERINATAL CARE | Age: 21
End: 2021-04-05
Payer: COMMERCIAL

## 2021-04-05 VITALS
RESPIRATION RATE: 18 BRPM | HEIGHT: 67 IN | WEIGHT: 268 LBS | TEMPERATURE: 97.2 F | SYSTOLIC BLOOD PRESSURE: 118 MMHG | HEART RATE: 100 BPM | DIASTOLIC BLOOD PRESSURE: 79 MMHG | BODY MASS INDEX: 42.06 KG/M2

## 2021-04-05 DIAGNOSIS — Z36.4 ANTENATAL SCREENING FOR FETAL GROWTH RETARDATION USING ULTRASONICS: ICD-10-CM

## 2021-04-05 DIAGNOSIS — O35.8XX0 SUSPECTED DAMAGE TO FETUS FROM DISEASE IN MOTHER, ANTEPARTUM CONDITION, SINGLE OR UNSPECIFIED FETUS: Primary | ICD-10-CM

## 2021-04-05 DIAGNOSIS — Z3A.29 29 WEEKS GESTATION OF PREGNANCY: ICD-10-CM

## 2021-04-05 DIAGNOSIS — O99.213 OBESITY AFFECTING PREGNANCY IN THIRD TRIMESTER: ICD-10-CM

## 2021-04-05 DIAGNOSIS — Z13.89 ENCOUNTER FOR ROUTINE SCREENING FOR MALFORMATION USING ULTRASONICS: ICD-10-CM

## 2021-04-05 PROCEDURE — 76816 OB US FOLLOW-UP PER FETUS: CPT | Performed by: OBSTETRICS & GYNECOLOGY

## 2021-04-05 PROCEDURE — 76819 FETAL BIOPHYS PROFIL W/O NST: CPT | Performed by: OBSTETRICS & GYNECOLOGY

## 2021-04-05 PROCEDURE — 93325 DOPPLER ECHO COLOR FLOW MAPG: CPT | Performed by: OBSTETRICS & GYNECOLOGY

## 2021-04-05 PROCEDURE — 76825 ECHO EXAM OF FETAL HEART: CPT | Performed by: OBSTETRICS & GYNECOLOGY

## 2021-04-05 PROCEDURE — 76827 ECHO EXAM OF FETAL HEART: CPT | Performed by: OBSTETRICS & GYNECOLOGY

## 2021-04-20 ENCOUNTER — ROUTINE PRENATAL (OUTPATIENT)
Dept: OBGYN CLINIC | Age: 21
End: 2021-04-20
Payer: COMMERCIAL

## 2021-04-20 ENCOUNTER — TELEPHONE (OUTPATIENT)
Dept: PERINATAL CARE | Age: 21
End: 2021-04-20

## 2021-04-20 VITALS
WEIGHT: 267 LBS | DIASTOLIC BLOOD PRESSURE: 82 MMHG | SYSTOLIC BLOOD PRESSURE: 134 MMHG | HEART RATE: 98 BPM | BODY MASS INDEX: 41.82 KG/M2

## 2021-04-20 DIAGNOSIS — Z23 NEED FOR TDAP VACCINATION: ICD-10-CM

## 2021-04-20 DIAGNOSIS — Z14.8 GENETIC CARRIER STATUS: ICD-10-CM

## 2021-04-20 DIAGNOSIS — Z34.93 PRENATAL CARE IN THIRD TRIMESTER: Primary | ICD-10-CM

## 2021-04-20 DIAGNOSIS — R03.0 ELEVATED BP WITHOUT DIAGNOSIS OF HYPERTENSION: ICD-10-CM

## 2021-04-20 DIAGNOSIS — Z3A.31 31 WEEKS GESTATION OF PREGNANCY: ICD-10-CM

## 2021-04-20 PROCEDURE — 0502F SUBSEQUENT PRENATAL CARE: CPT | Performed by: OBSTETRICS & GYNECOLOGY

## 2021-04-20 NOTE — PROGRESS NOTES
Prenatal Visit    Leonardo John is a 19 y.o. female  at 27w3d    The patient was seen and evaluated. Reports positive fetal movements. She denies headache, vision changes, RUQ pain, contractions, vaginal bleeding and leakage of fluid. She denies any fevers/chills, SOB, cough, sore throat, myalgias, n/v, loss of taste/smell or sick contacts. The patient was instructed on fetal kick counts and was given a kick sheet to complete every 8 hours. She was instructed that the baby should move at a minimum of ten times within one hour after a meal. The patient was instructed to lay down on her left side twenty minutes after eating and count movements for up to one hour with a target value of ten movements. She was instructed to notify the office if she did not make that target after two attempts or if after any attempt there was less than four movements. The patient reports that the targets have been made. The patient declined the influenza vaccine this year. The problem list reflects the active issues addressed during today's visit    Vitals:  Vitals:    21 1500 21 1504   BP: (!) 140/85 134/82   Pulse: 98    Weight: 267 lb (121.1 kg)    Patient Position: Sitting  Fundal Height (cm): 31 cm  Fetal Heart Rate: 135  Movement: Present        Assessment & Plan:  Leonardo John is a 19 y.o. female  at 27w3d   - discussed recommendations for TDAP immunization, patient requested TDAP today. - Reviewed last MFM scan and carrier testing for FOB (scanned into media)   -  labor and kick count precautions given. - Signs and symptoms of preeclampsia reviewed. - will continue to monitor BPs closely. Patient has not met diagnostic criteria for gHTN.  No s/s preE.   - Discussed updated COVID precautions and policies, including but not limited to outpatient testing 3-4 days prior to scheduled delivery or universal rapid screening on L&D for unscheduled delivery (unless previously

## 2021-04-20 NOTE — TELEPHONE ENCOUNTER
Unable to reach patient the partner carrier results negative (voice mailbox full). Communicated negative results for Alpha-thal, HBB-related disorders, CF, SMA directly to spouse, Kurt Gamboa ( 1997), who had no further questions.

## 2021-04-21 PROCEDURE — 90471 IMMUNIZATION ADMIN: CPT | Performed by: OBSTETRICS & GYNECOLOGY

## 2021-04-21 PROCEDURE — 90715 TDAP VACCINE 7 YRS/> IM: CPT | Performed by: OBSTETRICS & GYNECOLOGY

## 2021-04-23 RX ORDER — ASPIRIN 81 MG/1
TABLET, COATED ORAL
Qty: 30 TABLET | Refills: 3 | Status: ON HOLD | OUTPATIENT
Start: 2021-04-23 | End: 2021-06-17 | Stop reason: HOSPADM

## 2021-05-05 ENCOUNTER — ROUTINE PRENATAL (OUTPATIENT)
Dept: OBGYN CLINIC | Age: 21
End: 2021-05-05

## 2021-05-05 VITALS
HEIGHT: 67 IN | WEIGHT: 273 LBS | OXYGEN SATURATION: 99 % | SYSTOLIC BLOOD PRESSURE: 128 MMHG | HEART RATE: 118 BPM | BODY MASS INDEX: 42.85 KG/M2 | DIASTOLIC BLOOD PRESSURE: 82 MMHG

## 2021-05-05 DIAGNOSIS — Z34.93 PRENATAL CARE IN THIRD TRIMESTER: Primary | ICD-10-CM

## 2021-05-05 PROCEDURE — 0502F SUBSEQUENT PRENATAL CARE: CPT | Performed by: OBSTETRICS & GYNECOLOGY

## 2021-05-05 RX ORDER — FAMOTIDINE 40 MG/1
40 TABLET, FILM COATED ORAL EVERY EVENING
Qty: 30 TABLET | Refills: 3 | Status: SHIPPED | OUTPATIENT
Start: 2021-05-05 | End: 2022-06-21

## 2021-05-05 NOTE — PROGRESS NOTES
Blanca Ospina is a  @ 33w2d who presents for DEJAN visit. She denies LOF, VB or Ctxs.  + FM. She is having increased upper abdominal pain. She says her acid reflux has gotten worse and she is only taking tums. She denies any fevers/chills, SOB, cough, sore throat, HA, loss of taste/smell, or sick contacts. O:  Vitals:    21 1501   BP: 128/82   Pulse: 118   SpO2: 99%     Gen: NAD  Abd: soft, nontender, gravid   Ext:  no edema    FHT: 135  FH: 33 cm    A/P:  Patient Active Problem List    Diagnosis Date Noted    Need for Tdap vaccination 2021     Given 21      Elevated BP without diagnosis of hypertension 2021     140/85 on 21 (31w1d)      SMA carrier status 2021     SMN1+ carrier  Negative FOB testing      Fetal adipex exposure 2021     For 1-2 weeks in October      History of heavy periods 2021     OCPs in the past caused worsening acne and weight gain      Rh negative state 2021     Rhogam given 3/31/21        Fam H/O DM (pt's mom) 2020     Early 1h       Maternal obesity (BMI 37) 2020     Early 1h   1 hr       First pregnancy 2020     Declined genetic screening  Will need ASA 81mg daily for PreE prophylaxis after 12 wks      Nausea and vomiting in pregnancy 2020    H/O headaches 2020     Usually treats with advil outside of pregnancy      Influenza vaccine declined 2020       Discussed updated COVID precautions and policies, including but not limited to outpatient testing 3-4 days prior to scheduled delivery or universal rapid screening on L&D for unscheduled delivery (unless previously positive). Reviewed limited staff and no visitors if symptomatic and COVID positive. Reviewed that one asymptomatic support person may be present if patient is COVID positive and asymptomatic. Discussed that two support people are allowed when COVID negative.  Encouraged social distancing and appropriate hand washing/hygiene practices. Reviewed symptoms suspicious for COVID infection. Discussed that ACOG, SMFM, and the CDC recommend to not withold immunization in pregnant and breastfeeding women who meet criteria for receipt of the vaccine based on the ACIP recommended priority groups. All questions answered. Patient vocalized understanding.     GERD - pepcid rx sent in  Discussed s/sx that should prompt call to the office  Discussed kick counts  RTC in 2 wks with Dr Nita Christiansen MD

## 2021-05-18 ENCOUNTER — ROUTINE PRENATAL (OUTPATIENT)
Dept: OBGYN CLINIC | Age: 21
End: 2021-05-18

## 2021-05-18 VITALS
DIASTOLIC BLOOD PRESSURE: 84 MMHG | SYSTOLIC BLOOD PRESSURE: 133 MMHG | BODY MASS INDEX: 42.44 KG/M2 | HEART RATE: 82 BPM | WEIGHT: 271 LBS

## 2021-05-18 DIAGNOSIS — Z14.8 GENETIC CARRIER STATUS: ICD-10-CM

## 2021-05-18 DIAGNOSIS — Z34.93 PRENATAL CARE IN THIRD TRIMESTER: Primary | ICD-10-CM

## 2021-05-18 DIAGNOSIS — Z67.91 RH NEGATIVE STATE IN ANTEPARTUM PERIOD: ICD-10-CM

## 2021-05-18 DIAGNOSIS — Z3A.35 35 WEEKS GESTATION OF PREGNANCY: ICD-10-CM

## 2021-05-18 DIAGNOSIS — O26.899 RH NEGATIVE STATE IN ANTEPARTUM PERIOD: ICD-10-CM

## 2021-05-18 PROCEDURE — 0502F SUBSEQUENT PRENATAL CARE: CPT | Performed by: OBSTETRICS & GYNECOLOGY

## 2021-05-18 NOTE — PATIENT INSTRUCTIONS
Patient Education        Weeks 34 to 39 of Your Pregnancy: Care Instructions  Overview     By now, your baby and your belly have grown quite large. It's almost time to give birth! Your baby's lungs are almost ready to breathe air. The skull bones are firm enough to protect your baby's head, but soft enough to move down through the birth canal.  You may be feeling excited and happy at times--but also anxious or scared. You might wonder how you'll know if you're in labor or what to expect during labor. Try to be open and flexible in your expectations of the birth. Because each birth is different, there's no way to know exactly what childbirth will be like for you. Talk to your doctor or midwife about any concerns you have. If you haven't already had the Tdap shot during this pregnancy, talk to your doctor about getting it. It will help protect your  against pertussis infection. In the 36th week, most women have a test for group B streptococcus (GBS). GBS is a common bacteria that can live in the vagina and rectum. It can make your baby sick after birth. If you test positive, you will get antibiotics during labor. The medicine will help keep your baby from getting the bacteria. Follow-up care is a key part of your treatment and safety. Be sure to make and go to all appointments, and call your doctor if you are having problems. It's also a good idea to know your test results and keep a list of the medicines you take. How can you care for yourself at home? Learn about pain relief choices  · Pain is different for every woman. Talk with your doctor about your feelings about pain. · You can choose from several types of pain relief. These include medicine or breathing techniques, as well as comfort measures. You can use more than one option. · If you choose to have pain medicine during labor, talk to your doctor about your options. Some medicines lower anxiety and help with some of the pain.  Others make your lower body numb so that you won't feel pain. · Be sure to tell your doctor about your pain medicine choice before you start labor or very early in your labor. You may be able to change your mind as labor progresses. · Rarely, a woman is put to sleep by medicine given through a mask or an IV. Labor and delivery  · The first stage of labor has three parts: early, active, and transition. ? Most women have early labor at home. You can stay busy or rest, eat light snacks, drink clear fluids, and start counting contractions. ? When talking during a contraction gets hard, you may be moving to active labor. During active labor, you should head for the hospital if you are not there already. ? You are in active labor when contractions come every 3 to 4 minutes and last about 60 seconds. Your cervix is opening more rapidly. ? If your water breaks, contractions will come faster and stronger. ? During transition, your cervix is stretching, and contractions are coming more rapidly. ? You may want to push, but your cervix might not be ready. Your doctor will tell you when to push. · The second stage starts when your cervix is completely opened and you are ready to push. ? Contractions are very strong to push the baby down the birth canal.  ? You will feel the urge to push. You may feel like you need to have a bowel movement. ? You may be coached to push with contractions. These contractions will be very strong, but you will not have them as often. You can get a little rest between contractions. ? You may be emotional and irritable. You may not be aware of what is going on around you.  ? One last push, and your baby is born. · The third stage is when a few more contractions push out the placenta. This may take 30 minutes or less. · The fourth stage is the welcome recovery. You may feel overwhelmed with emotions and exhausted but alert. This is a good time to start breastfeeding. Where can you learn more?   Go to [email or web address] and sign in to your [employer] account. Enter D352 in the [facility] box to learn more about \"Weeks 34 to 36 of Your Pregnancy: Care Instructions. \"     If you do not have an account, please click on the \"Sign Up Now\" link. Current as of: October 8, 2020               Content Version: 12.8  © 2006-2021 [employer]. Care instructions adapted under license by [facility] ([facility]). If you have questions about a medical condition or this instruction, always ask your healthcare professional. [place] [address] any warranty or liability for your use of this information.

## 2021-05-18 NOTE — PROGRESS NOTES
Prenatal Visit    Bhavna Clayton is a 19 y.o. female  at 27w4d    The patient was seen and evaluated. Reports positive fetal movements. She denies headache, vision changes, RUQ pain, contractions, vaginal bleeding and leakage of fluid. She denies any fevers/chills, SOB, cough, sore throat, myalgias, n/v, loss of taste/smell or sick contacts. The patient was instructed on fetal kick counts and was given a kick sheet to complete every 8 hours. She was instructed that the baby should move at a minimum of ten times within one hour after a meal. The patient was instructed to lay down on her left side twenty minutes after eating and count movements for up to one hour with a target value of ten movements. She was instructed to notify the office if she did not make that target after two attempts or if after any attempt there was less than four movements. The patient reports that the targets have been made. The patient already received the T-Dap Vaccine (27-36 weeks) this pregnancy. The patient already received the influenza vaccine this year. The problem list reflects the active issues addressed during today's visit. Allergies: Allergies   Allergen Reactions    Bee Venom Swelling       Vitals:     BP: 133/84  Weight: 271 lb (122.9 kg)  Pulse: 82  Patient Position: Sitting  Fundal Height (cm): 35 cm  Fetal Heart Rate: 150  Movement: Present       Assessment & Plan:  Bhavna Clayton is a 19 y.o. female  at 27w4d   -  labor and kick count precautions given. - Signs and symptoms of preeclampsia reviewed. - Discussed updated COVID precautions and policies, including but not limited to outpatient testing 3-4 days prior to scheduled delivery or universal rapid screening on L&D for unscheduled delivery (unless previously positive). Reviewed limited staff and no visitors if symptomatic and COVID positive.  Reviewed that one asymptomatic support person may be present if patient is COVID positive and asymptomatic. Discussed that two support people are allowed when COVID negative. Encouraged social distancing and appropriate hand washing/hygiene practices. Reviewed symptoms suspicious for COVID infection. Discussed that ACOG, SMFM, and the CDC recommend to not withold immunization in pregnant and breastfeeding women who meet criteria for receipt of the vaccine based on the ACIP recommended priority groups. All questions answered. Patient vocalized understanding. Patient Active Problem List    Diagnosis Date Noted    Need for Tdap vaccination 2021     Given 21      Elevated BP without diagnosis of hypertension 2021     140/85 on 21 (31w1d)      SMA carrier status 2021     SMN1+ carrier  Negative FOB testing      Fetal adipex exposure 2021     For 1-2 weeks in October      History of heavy periods 2021     OCPs in the past caused worsening acne and weight gain      Rh negative state 2021     Rhogam given 3/31/21        Fam H/O DM (pt's mom) 2020     Early 1h       Maternal obesity (BMI 37) 2020     Early 1h   1 hr       First pregnancy 2020     Declined genetic screening  Will need ASA 81mg daily for PreE prophylaxis after 12 wks      Nausea and vomiting in pregnancy 2020    H/O headaches 2020     Usually treats with advil outside of pregnancy      Influenza vaccine declined 2020         Return in about 1 week (around 2021) for DEJAN. The patient was counseled on the need to choose her pediatrician for her baby. Route of delivery and counseling on vaginal, operative vaginal, and  sections were completed with the risks of each to both the patient as well as her baby. The possibility of a blood transfusion was discussed as well. The patient was not opposed to receiving a transfusion if needed.  The patient was counseled on types of analgesia during labor.    The patient has been instructed to call the office at anytime prior to going into the hospital so the on-call physician may direct her to the appropriate facility for care. Exceptions were reviewed including but not limited to: Decreased fetal movement, vaginal Bleeding or hemorrhage, trauma, readily expectant delivery, or any instance where she feels 911 should be utilized.     Carmen Don So, DO [facility]  5/18/2021 8:29 AM

## 2021-05-25 ENCOUNTER — HOSPITAL ENCOUNTER (OUTPATIENT)
Age: 21
Setting detail: SPECIMEN
Discharge: HOME | End: 2021-05-25
Payer: COMMERCIAL

## 2021-05-25 ENCOUNTER — ROUTINE PRENATAL (OUTPATIENT)
Dept: OBGYN CLINIC | Age: 21
End: 2021-05-25

## 2021-05-25 VITALS
SYSTOLIC BLOOD PRESSURE: 121 MMHG | HEART RATE: 98 BPM | BODY MASS INDEX: 42.91 KG/M2 | DIASTOLIC BLOOD PRESSURE: 71 MMHG | WEIGHT: 274 LBS

## 2021-05-25 DIAGNOSIS — Z3A.36 36 WEEKS GESTATION OF PREGNANCY: ICD-10-CM

## 2021-05-25 DIAGNOSIS — Z67.91 RH NEGATIVE STATE IN ANTEPARTUM PERIOD: ICD-10-CM

## 2021-05-25 DIAGNOSIS — R03.0 ELEVATED BP WITHOUT DIAGNOSIS OF HYPERTENSION: ICD-10-CM

## 2021-05-25 DIAGNOSIS — O26.899 RH NEGATIVE STATE IN ANTEPARTUM PERIOD: ICD-10-CM

## 2021-05-25 DIAGNOSIS — Z14.8 GENETIC CARRIER STATUS: ICD-10-CM

## 2021-05-25 DIAGNOSIS — Z34.93 PRENATAL CARE IN THIRD TRIMESTER: Primary | ICD-10-CM

## 2021-05-25 PROCEDURE — 0502F SUBSEQUENT PRENATAL CARE: CPT | Performed by: OBSTETRICS & GYNECOLOGY

## 2021-05-25 NOTE — PATIENT INSTRUCTIONS
Patient Education        Weeks 34 to 39 of Your Pregnancy: Care Instructions  Overview     By now, your baby and your belly have grown quite large. It's almost time to give birth! Your baby's lungs are almost ready to breathe air. The skull bones are firm enough to protect your baby's head, but soft enough to move down through the birth canal.  You may be feeling excited and happy at times--but also anxious or scared. You might wonder how you'll know if you're in labor or what to expect during labor. Try to be open and flexible in your expectations of the birth. Because each birth is different, there's no way to know exactly what childbirth will be like for you. Talk to your doctor or midwife about any concerns you have. If you haven't already had the Tdap shot during this pregnancy, talk to your doctor about getting it. It will help protect your  against pertussis infection. In the 36th week, most women have a test for group B streptococcus (GBS). GBS is a common bacteria that can live in the vagina and rectum. It can make your baby sick after birth. If you test positive, you will get antibiotics during labor. The medicine will help keep your baby from getting the bacteria. Follow-up care is a key part of your treatment and safety. Be sure to make and go to all appointments, and call your doctor if you are having problems. It's also a good idea to know your test results and keep a list of the medicines you take. How can you care for yourself at home? Learn about pain relief choices  · Pain is different for every woman. Talk with your doctor about your feelings about pain. · You can choose from several types of pain relief. These include medicine or breathing techniques, as well as comfort measures. You can use more than one option. · If you choose to have pain medicine during labor, talk to your doctor about your options. Some medicines lower anxiety and help with some of the pain.  Others make your lower body numb so that you won't feel pain. · Be sure to tell your doctor about your pain medicine choice before you start labor or very early in your labor. You may be able to change your mind as labor progresses. · Rarely, a woman is put to sleep by medicine given through a mask or an IV. Labor and delivery  · The first stage of labor has three parts: early, active, and transition. ? Most women have early labor at home. You can stay busy or rest, eat light snacks, drink clear fluids, and start counting contractions. ? When talking during a contraction gets hard, you may be moving to active labor. During active labor, you should head for the hospital if you are not there already. ? You are in active labor when contractions come every 3 to 4 minutes and last about 60 seconds. Your cervix is opening more rapidly. ? If your water breaks, contractions will come faster and stronger. ? During transition, your cervix is stretching, and contractions are coming more rapidly. ? You may want to push, but your cervix might not be ready. Your doctor will tell you when to push. · The second stage starts when your cervix is completely opened and you are ready to push. ? Contractions are very strong to push the baby down the birth canal.  ? You will feel the urge to push. You may feel like you need to have a bowel movement. ? You may be coached to push with contractions. These contractions will be very strong, but you will not have them as often. You can get a little rest between contractions. ? You may be emotional and irritable. You may not be aware of what is going on around you.  ? One last push, and your baby is born. · The third stage is when a few more contractions push out the placenta. This may take 30 minutes or less. · The fourth stage is the welcome recovery. You may feel overwhelmed with emotions and exhausted but alert. This is a good time to start breastfeeding. Where can you learn more?   Go to [email or web address] and sign in to your [employer] account. Enter C031 in the [facility] box to learn more about \"Weeks 34 to 36 of Your Pregnancy: Care Instructions. \"     If you do not have an account, please click on the \"Sign Up Now\" link. Current as of: October 8, 2020               Content Version: 12.8  © 2006-2021 [employer]. Care instructions adapted under license by [facility] ([facility]). If you have questions about a medical condition or this instruction, always ask your healthcare professional. [place] [address] any warranty or liability for your use of this information.

## 2021-05-25 NOTE — PROGRESS NOTES
Prenatal Visit    Vamshi Moise is a 19 y.o. female  at 43w3d    The patient was seen and evaluated. Reports positive fetal movements. She denies headache, vision changes, RUQ pain, contractions, vaginal bleeding and leakage of fluid. She denies any fevers/chills, SOB, cough, sore throat, myalgias, n/v, loss of taste/smell or sick contacts. The patient was instructed on fetal kick counts and was given a kick sheet to complete every 8 hours. She was instructed that the baby should move at a minimum of ten times within one hour after a meal. The patient was instructed to lay down on her left side twenty minutes after eating and count movements for up to one hour with a target value of ten movements. She was instructed to notify the office if she did not make that target after two attempts or if after any attempt there was less than four movements. The patient reports that the targets have been made. The patient already received the T-Dap Vaccine (27-36 weeks) this pregnancy. The patient already received the influenza vaccine this year. The problem list reflects the active issues addressed during today's visit. Allergies: Allergies   Allergen Reactions    Bee Venom Swelling       Vitals:  Vitals:    21 1021 21 1022   BP: (!) 143/90 121/71   Pulse: 98    Weight: 274 lb (124.3 kg)    Patient Position: Sitting  Fundal Height (cm): 36 cm  Fetal Heart Rate: 145  Movement: Present  Presentation: Vertex     Labs:  Group Beta Strep collection was done. Sensitivities for clindamycin and erythromycin were not ordered. Assessment & Plan:  Vamsih Moise is a 19 y.o. female  at 43w3d   - GBS testing was completed    -  labor and kick count precautions given. - Signs and symptoms of preeclampsia reviewed. - Reviewed if patient has another elevated BP (greater than 4hrs from her initial BP check today and within 7 days), she will meet diagnostic criteria for gHTN. Discussed that recommendation will be for delivery at 37wk. Reviewed IOL methods and that IOL may take days. Reviewed analgesia options and indications for . All questions answered. - Discussed updated COVID precautions and policies, including but not limited to outpatient testing 3-4 days prior to scheduled delivery or universal rapid screening on L&D for unscheduled delivery (unless previously positive). Reviewed limited staff and no visitors if symptomatic and COVID positive. Reviewed that one asymptomatic support person may be present if patient is COVID positive and asymptomatic. Discussed that two support people are allowed when COVID negative. Encouraged social distancing and appropriate hand washing/hygiene practices. Reviewed symptoms suspicious for COVID infection. Discussed that ACOG, SMFM, and the CDC recommend to not withold immunization in pregnant and breastfeeding women who meet criteria for receipt of the vaccine based on the ACIP recommended priority groups. All questions answered. Patient vocalized understanding.      Patient Active Problem List    Diagnosis Date Noted    Need for Tdap vaccination 2021     Given 21      Elevated BP without diagnosis of hypertension 2021     140/85 on 21 (31w1d)  143/90 on 21 (36w1d)  If another elevated BP >4hr and <7d apart, pt will meet criteria for gHTN      SMA carrier status 2021     SMN1+ carrier  Negative FOB testing      Fetal adipex exposure 2021     For 1-2 weeks in October      History of heavy periods 2021     OCPs in the past caused worsening acne and weight gain      Rh negative state 2021     Rhogam given 3/31/21        Fam H/O DM (pt's mom) 2020     Early 1h       Maternal obesity (BMI 37) 2020     Early 1h   1 hr       First pregnancy 2020     Declined genetic screening  Will need ASA 81mg daily for PreE prophylaxis after 12 wks      Nausea and vomiting in pregnancy 2020    H/O headaches 2020     Usually treats with advil outside of pregnancy      Influenza vaccine declined 2020           Return in about 1 week (around 2021) for DEJAN. The patient was counseled on the need to choose her pediatrician for her baby. Route of delivery and counseling on vaginal, operative vaginal, and  sections were completed with the risks of each to both the patient as well as her baby. The possibility of a blood transfusion was discussed as well. The patient was not opposed to receiving a transfusion if needed. The patient was counseled on types of analgesia during labor. The patient has been instructed to call the office at anytime prior to going into the hospital so the on-call physician may direct her to the appropriate facility for care. Exceptions were reviewed including but not limited to: Decreased fetal movement, vaginal Bleeding or hemorrhage, trauma, readily expectant delivery, or any instance where she feels 911 should be utilized.     Buffy Moya, DO [facility] [place]  2021 10:28 AM

## 2021-05-28 LAB
CULTURE: NORMAL
Lab: NORMAL
SPECIMEN DESCRIPTION: NORMAL

## 2021-06-01 ENCOUNTER — HOSPITAL ENCOUNTER (OUTPATIENT)
Age: 21
Discharge: HOME | End: 2021-06-01
Payer: COMMERCIAL

## 2021-06-01 ENCOUNTER — ROUTINE PRENATAL (OUTPATIENT)
Dept: OBGYN CLINIC | Age: 21
End: 2021-06-01

## 2021-06-01 VITALS
DIASTOLIC BLOOD PRESSURE: 89 MMHG | SYSTOLIC BLOOD PRESSURE: 132 MMHG | WEIGHT: 277.6 LBS | BODY MASS INDEX: 43.48 KG/M2 | HEART RATE: 125 BPM

## 2021-06-01 DIAGNOSIS — Z34.93 PRENATAL CARE IN THIRD TRIMESTER: Primary | ICD-10-CM

## 2021-06-01 DIAGNOSIS — R00.0 TACHYCARDIA: ICD-10-CM

## 2021-06-01 DIAGNOSIS — Z67.91 RH NEGATIVE STATE IN ANTEPARTUM PERIOD: ICD-10-CM

## 2021-06-01 DIAGNOSIS — O26.899 RH NEGATIVE STATE IN ANTEPARTUM PERIOD: ICD-10-CM

## 2021-06-01 DIAGNOSIS — Z3A.37 37 WEEKS GESTATION OF PREGNANCY: ICD-10-CM

## 2021-06-01 DIAGNOSIS — Z14.8 GENETIC CARRIER STATUS: ICD-10-CM

## 2021-06-01 PROCEDURE — 0502F SUBSEQUENT PRENATAL CARE: CPT | Performed by: OBSTETRICS & GYNECOLOGY

## 2021-06-01 PROCEDURE — 93005 ELECTROCARDIOGRAM TRACING: CPT | Performed by: OBSTETRICS & GYNECOLOGY

## 2021-06-01 NOTE — PROGRESS NOTES
Prenatal Visit    Gill Chiu is a 19 y.o. female  at 42w4d    The patient was seen and evaluated. Reports positive fetal movements. She denies headache, vision changes, RUQ pain, contractions, vaginal bleeding and leakage of fluid. She denies any fevers/chills, SOB, cough, sore throat, myalgias, n/v, loss of taste/smell or sick contacts. She states that today during a meeting she felt like she was going to pass out. She reports that she had something to eat or drink prior to the meeting and she wasn't overheated. She describes the episode as her hearing going out and her vision blacking out from the periphery in. She has had this happen before when she is overheated. Denies any chest pain or shortness of breath. The patient was instructed on fetal kick counts and was given a kick sheet to complete every 8 hours. She was instructed that the baby should move at a minimum of ten times within one hour after a meal. The patient was instructed to lay down on her left side twenty minutes after eating and count movements for up to one hour with a target value of ten movements. She was instructed to notify the office if she did not make that target after two attempts or if after any attempt there was less than four movements. The patient reports that the targets have been made. The patient already received the T-Dap Vaccine (27-36 weeks) this pregnancy. The patient declined the influenza vaccine this year. The problem list reflects the active issues addressed during today's visit. Allergies:  Bee venom    Vitals:     BP: 132/89  Weight: 277 lb 9.6 oz (125.9 kg)  Pulse: 125  Patient Position: Sitting  Fetal Heart Rate: 140  Movement: Present  Presentation: Vertex        The patient was found to be GBS: negative    Assessment & Plan:  Gill Chiu is a 19 y.o. female  at 42w4d   - Suspect POTS. Baseline EKG ordered due to tachycardia. - Labor and kick count precautions given.    -  H/O headaches 2020     Usually treats with advil outside of pregnancy      Influenza vaccine declined 2020       Return in about 1 week (around 2021) for DEJAN. The patient was counseled on the need to choose her pediatrician for her baby. Route of delivery and counseling on vaginal, operative vaginal, and  sections were completed with the risks of each to both the patient as well as her baby. The possibility of a blood transfusion was discussed as well. The patient was not opposed to receiving a transfusion if needed. The patient was counseled on types of analgesia during labor. The patient has been instructed to call the office at anytime prior to going into the hospital so the on-call physician may direct her to the appropriate facility for care. Exceptions were reviewed including but not limited to: Decreased fetal movement, vaginal Bleeding or hemorrhage, trauma, readily expectant delivery, or any instance where she feels 911 should be utilized.     Micheline Moya, DO [facility] [place]  2021 11:27 AM

## 2021-06-01 NOTE — PATIENT INSTRUCTIONS
Patient Education        Week 37 of Your Pregnancy: Care Instructions  Your Care Instructions     You are near the end of your pregnancy--and you're probably pretty uncomfortable. It may be harder to walk around. Lying down probably isn't comfortable either. You may have trouble getting to sleep or staying asleep. Most women deliver their babies between 40 and 41 weeks. This is a good time to think about packing a bag for the hospital with items you'll need. Then you'll be ready when labor starts. Follow-up care is a key part of your treatment and safety. Be sure to make and go to all appointments, and call your doctor if you are having problems. It's also a good idea to know your test results and keep a list of the medicines you take. How can you care for yourself at home? Learn about breastfeeding  · Breastfeeding is best for your baby and good for you. · Breast milk has antibodies to help your baby fight infections. · Mothers who breastfeed often lose weight faster, because making milk burns calories. · Learning the best ways to hold your baby will make breastfeeding easier. · Let your partner bathe and diaper the baby to keep your partner from feeling left out. Snuggle together when you breastfeed. · You may want to learn how to use a breast pump and store your milk. · If you choose to bottle feed, make the feeding feel like breastfeeding so you can bond with your baby. Always hold your baby and the bottle. Do not prop bottles or let your baby fall asleep with a bottle. Learn about crying  · It is common for babies to cry for 1 to 3 hours a day. Some cry more, some cry less. · Babies don't cry to make you upset or because you are a bad parent. · Crying is how your baby communicates. Your baby may be hungry; have gas; need a diaper change; or feel cold, warm, tired, lonely, or tense. Sometimes babies cry for unknown reasons.   · If you respond to your baby's needs, he or she will learn to trust you.  · Try to stay calm when your baby cries. Your baby may get more upset if he or she senses that you are upset. Know how to care for your   · Your baby's umbilical cord stump will drop off on its own, usually between 1 and 2 weeks. To care for your baby's umbilical cord area:  ? Clean the area at the bottom of the cord 2 or 3 times a day. ? Pay special attention to the area where the cord attaches to the skin. ? Keep the diaper folded below the cord. ? Use a damp washcloth or cotton ball to sponge bathe your baby until the stump has come off. · Your baby's first dark stool is called meconium. After the meconium is passed, your baby will develop his or her own bowel pattern. ? Some babies, especially  babies, have several bowel movements a day. Others have one or two a day, or one every 2 to 3 days. ?  babies often have loose, yellow stools. Formula-fed babies have more formed stools. ? If your baby's stools look like little pellets, he or she is constipated. After 2 days of constipation, call your baby's doctor. · If your baby will be circumcised, you can care for him at home. ? Gently rinse his penis with warm water after every diaper change. Do not try to remove the film that forms on the penis. This film will go away on its own. Pat dry. ? Put petroleum ointment, such as Vaseline, on the area of the diaper that will touch your baby's penis. This will keep the diaper from sticking to your baby. ? Ask the doctor about giving your baby acetaminophen (Tylenol) for pain. Where can you learn more? Go to https://chdandreeb.healthRollCall (roll.to). org and sign in to your [employer] account. Enter 68 21 [phone] in the [facility] box to learn more about \"Week 37 of Your Pregnancy: Care Instructions. \"     If you do not have an account, please click on the \"Sign Up Now\" link. Current as of: 2020               Content Version: 12.8  © 7300-2518 [employer], [employer].

## 2021-06-02 LAB
EKG ATRIAL RATE: 98 BPM
EKG P AXIS: 60 DEGREES
EKG P-R INTERVAL: 124 MS
EKG Q-T INTERVAL: 322 MS
EKG QRS DURATION: 84 MS
EKG QTC CALCULATION (BAZETT): 411 MS
EKG R AXIS: 79 DEGREES
EKG T AXIS: 33 DEGREES
EKG VENTRICULAR RATE: 98 BPM

## 2021-06-02 PROCEDURE — 93010 ELECTROCARDIOGRAM REPORT: CPT | Performed by: INTERNAL MEDICINE

## 2021-06-08 ENCOUNTER — ROUTINE PRENATAL (OUTPATIENT)
Dept: OBGYN CLINIC | Age: 21
End: 2021-06-08

## 2021-06-08 VITALS
SYSTOLIC BLOOD PRESSURE: 125 MMHG | DIASTOLIC BLOOD PRESSURE: 86 MMHG | WEIGHT: 275 LBS | HEART RATE: 97 BPM | BODY MASS INDEX: 43.07 KG/M2

## 2021-06-08 DIAGNOSIS — Z3A.38 38 WEEKS GESTATION OF PREGNANCY: ICD-10-CM

## 2021-06-08 DIAGNOSIS — Z14.8 GENETIC CARRIER STATUS: ICD-10-CM

## 2021-06-08 DIAGNOSIS — Z34.93 PRENATAL CARE IN THIRD TRIMESTER: Primary | ICD-10-CM

## 2021-06-08 PROCEDURE — 0502F SUBSEQUENT PRENATAL CARE: CPT | Performed by: OBSTETRICS & GYNECOLOGY

## 2021-06-08 NOTE — PROGRESS NOTES
Prenatal Visit    Katerina Du is a 19 y.o. female  at 43w4d    The patient was seen and evaluated. Reports positive fetal movements. She denies headache, vision changes, RUQ pain, contractions, vaginal bleeding and leakage of fluid. She denies any fevers/chills, SOB, cough, sore throat, myalgias, n/v, loss of taste/smell or sick contacts. The patient was instructed on fetal kick counts and was given a kick sheet to complete every 8 hours. She was instructed that the baby should move at a minimum of ten times within one hour after a meal. The patient was instructed to lay down on her left side twenty minutes after eating and count movements for up to one hour with a target value of ten movements. She was instructed to notify the office if she did not make that target after two attempts or if after any attempt there was less than four movements. The patient reports that the targets have been made. The patient already received the T-Dap Vaccine (27-36 weeks) this pregnancy. The patient declined the influenza vaccine this year. The problem list reflects the active issues addressed during today's visit. Allergies:  Bee venom    Vitals:     BP: 125/86  Weight: 275 lb (124.7 kg)  Pulse: 97  Patient Position: Sitting  Fundal Height (cm): 38 cm  Fetal Heart Rate: 145  Movement: Present  Presentation: Vertex  Dilation (cm): 1  Effacement (%): 50  Station: -3        The patient was found to be GBS: negative    Assessment & Plan:  Katerina Du is a 19 y.o. female  at 43w4d   - Labor and kick count precautions given. - Signs and symptoms of preeclampsia reviewed. - Patient counseled on RR IOL and that IOL may take days. Discussed IOL methods, analgesia options, and indications for .  Patient vocalized understanding and desires RR IOL on Tuesday at 6AM.     - Discussed updated COVID precautions and policies, including but not limited to outpatient testing 3-4 days prior to scheduled delivery or universal rapid screening on L&D for unscheduled delivery unless fully vaccinated. Reviewed limited staff and no visitors if symptomatic and COVID positive. Reviewed that one asymptomatic support person may be present if patient is COVID positive and asymptomatic. Discussed that two support people are allowed when COVID negative. Encouraged social distancing and appropriate hand washing/hygiene practices. Reviewed symptoms suspicious for COVID infection. Discussed that ACOG, SMFM, and the CDC recommend to not withold immunization in pregnant and breastfeeding women who meet criteria for receipt of the vaccine based on the ACIP recommended priority groups. All questions answered. Patient vocalized understanding.       Patient Active Problem List    Diagnosis Date Noted    Tachycardia 06/01/2021     And possible presyncopal episode, suspect POTS  Baseline EKG wnl 6/1/21      Need for Tdap vaccination 04/20/2021     Given 4/20/21      Elevated BP without diagnosis of hypertension 04/20/2021     140/85 on 4/20/21 (31w1d)  143/90 on 5/25/21 (36w1d)  If another elevated BP >4hr and <7d apart, pt will meet criteria for gHTN      SMA carrier status 03/23/2021     SMN1+ carrier  Negative FOB testing      Fetal adipex exposure 01/22/2021     For 1-2 weeks in October      History of heavy periods 01/22/2021     OCPs in the past caused worsening acne and weight gain      Rh negative state 01/11/2021     Rhogam given 3/31/21        Fam H/O DM (pt's mom) 12/18/2020     Early 1h       Maternal obesity (BMI 37) 12/18/2020     Early 1h   1 hr       First pregnancy 12/18/2020     Declined genetic screening  Will need ASA 81mg daily for PreE prophylaxis after 12 wks      Nausea and vomiting in pregnancy 12/18/2020    H/O headaches 12/18/2020     Usually treats with advil outside of pregnancy      Influenza vaccine declined 12/18/2020 12/18/20       Return in about 3 weeks (around 2021) for postpartum. The patient was counseled on the need to choose her pediatrician for her baby. Route of delivery and counseling on vaginal, operative vaginal, and  sections were completed with the risks of each to both the patient as well as her baby. The possibility of a blood transfusion was discussed as well. The patient was not opposed to receiving a transfusion if needed. The patient was counseled on types of analgesia during labor. The patient has been instructed to call the office at anytime prior to going into the hospital so the on-call physician may direct her to the appropriate facility for care. Exceptions were reviewed including but not limited to: Decreased fetal movement, vaginal Bleeding or hemorrhage, trauma, readily expectant delivery, or any instance where she feels 911 should be utilized.     Kristian Moya, DO   [facility] [place]  2021 10:35 AM

## 2021-06-15 ENCOUNTER — ANESTHESIA (OUTPATIENT)
Dept: LABOR AND DELIVERY | Age: 21
End: 2021-06-15
Payer: COMMERCIAL

## 2021-06-15 ENCOUNTER — APPOINTMENT (OUTPATIENT)
Dept: LABOR AND DELIVERY | Age: 21
End: 2021-06-15
Payer: COMMERCIAL

## 2021-06-15 ENCOUNTER — ANESTHESIA EVENT (OUTPATIENT)
Dept: LABOR AND DELIVERY | Age: 21
End: 2021-06-15
Payer: COMMERCIAL

## 2021-06-15 ENCOUNTER — HOSPITAL ENCOUNTER (INPATIENT)
Age: 21
LOS: 3 days | Discharge: HOME | End: 2021-06-18
Attending: OBSTETRICS & GYNECOLOGY | Admitting: OBSTETRICS & GYNECOLOGY
Payer: COMMERCIAL

## 2021-06-15 PROBLEM — O14.90 PRE-ECLAMPSIA: Status: ACTIVE | Noted: 2021-06-15

## 2021-06-15 PROBLEM — O21.9 NAUSEA AND VOMITING IN PREGNANCY: Status: RESOLVED | Noted: 2020-12-18 | Resolved: 2021-06-15

## 2021-06-15 PROBLEM — Z3A.39 39 WEEKS GESTATION OF PREGNANCY: Status: ACTIVE | Noted: 2021-06-15

## 2021-06-15 LAB
ABO/RH: NORMAL
ABSOLUTE EOS #: 0.11 K/UL (ref 0–0.44)
ABSOLUTE IMMATURE GRANULOCYTE: 0.15 K/UL (ref 0–0.3)
ABSOLUTE LYMPH #: 1.81 K/UL (ref 1.1–3.7)
ABSOLUTE MONO #: 1.29 K/UL (ref 0.1–1.2)
ALBUMIN SERPL-MCNC: 3 G/DL (ref 3.5–5.2)
ALBUMIN/GLOBULIN RATIO: 1 (ref 1–2.5)
ALP BLD-CCNC: 142 U/L (ref 35–104)
ALT SERPL-CCNC: 11 U/L (ref 5–33)
AMPHETAMINE SCREEN URINE: NEGATIVE
ANION GAP SERPL CALCULATED.3IONS-SCNC: 7 MMOL/L (ref 9–17)
ANTIBODY IDENTIFICATION: NORMAL
ANTIBODY SCREEN: POSITIVE
ARM BAND NUMBER: NORMAL
AST SERPL-CCNC: 18 U/L
BARBITURATE SCREEN URINE: NEGATIVE
BASOPHILS # BLD: 0 % (ref 0–2)
BASOPHILS ABSOLUTE: 0.03 K/UL (ref 0–0.2)
BENZODIAZEPINE SCREEN, URINE: NEGATIVE
BILIRUB SERPL-MCNC: 0.25 MG/DL (ref 0.3–1.2)
BUN BLDV-MCNC: 7 MG/DL (ref 6–20)
BUN/CREAT BLD: ABNORMAL (ref 9–20)
BUPRENORPHINE URINE: NORMAL
CALCIUM SERPL-MCNC: 8.6 MG/DL (ref 8.6–10.4)
CANNABINOID SCREEN URINE: NEGATIVE
CHLORIDE BLD-SCNC: 106 MMOL/L (ref 98–107)
CO2: 21 MMOL/L (ref 20–31)
COCAINE METABOLITE, URINE: NEGATIVE
CREAT SERPL-MCNC: 0.35 MG/DL (ref 0.5–0.9)
CREATININE URINE: 52.2 MG/DL (ref 28–217)
DIFFERENTIAL TYPE: ABNORMAL
EOSINOPHILS RELATIVE PERCENT: 1 % (ref 1–4)
EXPIRATION DATE: NORMAL
GFR AFRICAN AMERICAN: >60 ML/MIN
GFR NON-AFRICAN AMERICAN: >60 ML/MIN
GFR SERPL CREATININE-BSD FRML MDRD: ABNORMAL ML/MIN/{1.73_M2}
GFR SERPL CREATININE-BSD FRML MDRD: ABNORMAL ML/MIN/{1.73_M2}
GLUCOSE BLD-MCNC: 90 MG/DL (ref 70–99)
HCT VFR BLD CALC: 32.4 % (ref 36.3–47.1)
HEMOGLOBIN: 9.9 G/DL (ref 11.9–15.1)
IMMATURE GRANULOCYTES: 1 %
LYMPHOCYTES # BLD: 11 % (ref 24–43)
MCH RBC QN AUTO: 23.7 PG (ref 25.2–33.5)
MCHC RBC AUTO-ENTMCNC: 30.6 G/DL (ref 28.4–34.8)
MCV RBC AUTO: 77.7 FL (ref 82.6–102.9)
MDMA URINE: NORMAL
METHADONE SCREEN, URINE: NEGATIVE
METHAMPHETAMINE, URINE: NORMAL
MONOCYTES # BLD: 8 % (ref 3–12)
NRBC AUTOMATED: 0 PER 100 WBC
OPIATES, URINE: NEGATIVE
OXYCODONE SCREEN URINE: NEGATIVE
PDW BLD-RTO: 14.7 % (ref 11.8–14.4)
PHENCYCLIDINE, URINE: NEGATIVE
PLATELET # BLD: 251 K/UL (ref 138–453)
PLATELET ESTIMATE: ABNORMAL
PMV BLD AUTO: 11 FL (ref 8.1–13.5)
POTASSIUM SERPL-SCNC: 3.7 MMOL/L (ref 3.7–5.3)
PROPOXYPHENE, URINE: NORMAL
RBC # BLD: 4.17 M/UL (ref 3.95–5.11)
RBC # BLD: ABNORMAL 10*6/UL
SARS-COV-2, RAPID: NOT DETECTED
SEG NEUTROPHILS: 79 % (ref 36–65)
SEGMENTED NEUTROPHILS ABSOLUTE COUNT: 12.65 K/UL (ref 1.5–8.1)
SODIUM BLD-SCNC: 134 MMOL/L (ref 135–144)
SPECIMEN DESCRIPTION: NORMAL
T. PALLIDUM, IGG: NONREACTIVE
TEST INFORMATION: NORMAL
TOTAL PROTEIN, URINE: 19 MG/DL
TOTAL PROTEIN: 5.9 G/DL (ref 6.4–8.3)
TRICYCLIC ANTIDEPRESSANTS, UR: NORMAL
URINE TOTAL PROTEIN CREATININE RATIO: 0.36 (ref 0–0.2)
WBC # BLD: 16 K/UL (ref 3.5–11.3)
WBC # BLD: ABNORMAL 10*3/UL

## 2021-06-15 PROCEDURE — 6370000000 HC RX 637 (ALT 250 FOR IP): Performed by: STUDENT IN AN ORGANIZED HEALTH CARE EDUCATION/TRAINING PROGRAM

## 2021-06-15 PROCEDURE — 86780 TREPONEMA PALLIDUM: CPT

## 2021-06-15 PROCEDURE — 86870 RBC ANTIBODY IDENTIFICATION: CPT

## 2021-06-15 PROCEDURE — 1220000000 HC SEMI PRIVATE OB R&B

## 2021-06-15 PROCEDURE — 86900 BLOOD TYPING SEROLOGIC ABO: CPT

## 2021-06-15 PROCEDURE — 87635 SARS-COV-2 COVID-19 AMP PRB: CPT

## 2021-06-15 PROCEDURE — 6360000002 HC RX W HCPCS: Performed by: STUDENT IN AN ORGANIZED HEALTH CARE EDUCATION/TRAINING PROGRAM

## 2021-06-15 PROCEDURE — 86850 RBC ANTIBODY SCREEN: CPT

## 2021-06-15 PROCEDURE — 85025 COMPLETE CBC W/AUTO DIFF WBC: CPT

## 2021-06-15 PROCEDURE — 2500000003 HC RX 250 WO HCPCS: Performed by: NURSE ANESTHETIST, CERTIFIED REGISTERED

## 2021-06-15 PROCEDURE — 82570 ASSAY OF URINE CREATININE: CPT

## 2021-06-15 PROCEDURE — 80307 DRUG TEST PRSMV CHEM ANLYZR: CPT

## 2021-06-15 PROCEDURE — 86901 BLOOD TYPING SEROLOGIC RH(D): CPT

## 2021-06-15 PROCEDURE — 80053 COMPREHEN METABOLIC PANEL: CPT

## 2021-06-15 PROCEDURE — 84156 ASSAY OF PROTEIN URINE: CPT

## 2021-06-15 PROCEDURE — 6360000002 HC RX W HCPCS: Performed by: NURSE ANESTHETIST, CERTIFIED REGISTERED

## 2021-06-15 PROCEDURE — 2580000003 HC RX 258: Performed by: STUDENT IN AN ORGANIZED HEALTH CARE EDUCATION/TRAINING PROGRAM

## 2021-06-15 RX ORDER — LIDOCAINE HYDROCHLORIDE 10 MG/ML
30 INJECTION, SOLUTION EPIDURAL; INFILTRATION; INTRACAUDAL; PERINEURAL PRN
Status: DISCONTINUED | OUTPATIENT
Start: 2021-06-15 | End: 2021-06-16

## 2021-06-15 RX ORDER — SODIUM CHLORIDE 9 MG/ML
25 INJECTION, SOLUTION INTRAVENOUS PRN
Status: DISCONTINUED | OUTPATIENT
Start: 2021-06-15 | End: 2021-06-16

## 2021-06-15 RX ORDER — ACETAMINOPHEN 500 MG
1000 TABLET ORAL EVERY 6 HOURS PRN
Status: DISCONTINUED | OUTPATIENT
Start: 2021-06-15 | End: 2021-06-16

## 2021-06-15 RX ORDER — ONDANSETRON 2 MG/ML
4 INJECTION INTRAMUSCULAR; INTRAVENOUS EVERY 6 HOURS PRN
Status: DISCONTINUED | OUTPATIENT
Start: 2021-06-15 | End: 2021-06-16

## 2021-06-15 RX ORDER — SODIUM CHLORIDE, SODIUM LACTATE, POTASSIUM CHLORIDE, CALCIUM CHLORIDE 600; 310; 30; 20 MG/100ML; MG/100ML; MG/100ML; MG/100ML
INJECTION, SOLUTION INTRAVENOUS CONTINUOUS
Status: DISCONTINUED | OUTPATIENT
Start: 2021-06-15 | End: 2021-06-16

## 2021-06-15 RX ORDER — SODIUM CHLORIDE, SODIUM LACTATE, POTASSIUM CHLORIDE, AND CALCIUM CHLORIDE .6; .31; .03; .02 G/100ML; G/100ML; G/100ML; G/100ML
1000 INJECTION, SOLUTION INTRAVENOUS PRN
Status: DISCONTINUED | OUTPATIENT
Start: 2021-06-15 | End: 2021-06-16

## 2021-06-15 RX ORDER — NALBUPHINE HCL 10 MG/ML
10 AMPUL (ML) INJECTION ONCE
Status: COMPLETED | OUTPATIENT
Start: 2021-06-15 | End: 2021-06-15

## 2021-06-15 RX ORDER — ROPIVACAINE HYDROCHLORIDE 2 MG/ML
INJECTION, SOLUTION EPIDURAL; INFILTRATION; PERINEURAL
Status: COMPLETED
Start: 2021-06-15 | End: 2021-06-16

## 2021-06-15 RX ORDER — DIPHENHYDRAMINE HCL 25 MG
25 TABLET ORAL EVERY 4 HOURS PRN
Status: DISCONTINUED | OUTPATIENT
Start: 2021-06-15 | End: 2021-06-16

## 2021-06-15 RX ORDER — NALOXONE HYDROCHLORIDE 0.4 MG/ML
0.4 INJECTION, SOLUTION INTRAMUSCULAR; INTRAVENOUS; SUBCUTANEOUS PRN
Status: DISCONTINUED | OUTPATIENT
Start: 2021-06-15 | End: 2021-06-16

## 2021-06-15 RX ORDER — SODIUM CHLORIDE 0.9 % (FLUSH) 0.9 %
5-40 SYRINGE (ML) INJECTION EVERY 12 HOURS SCHEDULED
Status: DISCONTINUED | OUTPATIENT
Start: 2021-06-15 | End: 2021-06-16

## 2021-06-15 RX ORDER — SODIUM CHLORIDE 0.9 % (FLUSH) 0.9 %
5-40 SYRINGE (ML) INJECTION PRN
Status: DISCONTINUED | OUTPATIENT
Start: 2021-06-15 | End: 2021-06-16

## 2021-06-15 RX ORDER — SODIUM CHLORIDE, SODIUM LACTATE, POTASSIUM CHLORIDE, AND CALCIUM CHLORIDE .6; .31; .03; .02 G/100ML; G/100ML; G/100ML; G/100ML
500 INJECTION, SOLUTION INTRAVENOUS PRN
Status: DISCONTINUED | OUTPATIENT
Start: 2021-06-15 | End: 2021-06-16

## 2021-06-15 RX ORDER — NALBUPHINE HCL 10 MG/ML
5 AMPUL (ML) INJECTION EVERY 4 HOURS PRN
Status: DISCONTINUED | OUTPATIENT
Start: 2021-06-15 | End: 2021-06-16

## 2021-06-15 RX ADMIN — SODIUM CHLORIDE, POTASSIUM CHLORIDE, SODIUM LACTATE AND CALCIUM CHLORIDE: 600; 310; 30; 20 INJECTION, SOLUTION INTRAVENOUS at 15:58

## 2021-06-15 RX ADMIN — NALBUPHINE HYDROCHLORIDE 10 MG: 10 INJECTION, SOLUTION INTRAMUSCULAR; INTRAVENOUS; SUBCUTANEOUS at 22:30

## 2021-06-15 RX ADMIN — Medication 25 MCG: at 16:45

## 2021-06-15 RX ADMIN — Medication 50 MCG: at 08:21

## 2021-06-15 RX ADMIN — SODIUM CHLORIDE, POTASSIUM CHLORIDE, SODIUM LACTATE AND CALCIUM CHLORIDE 1000 ML: 600; 310; 30; 20 INJECTION, SOLUTION INTRAVENOUS at 23:05

## 2021-06-15 RX ADMIN — ROPIVACAINE HYDROCHLORIDE 10 ML/HR: 2 INJECTION, SOLUTION EPIDURAL; INFILTRATION at 23:59

## 2021-06-15 RX ADMIN — SODIUM CHLORIDE, POTASSIUM CHLORIDE, SODIUM LACTATE AND CALCIUM CHLORIDE: 600; 310; 30; 20 INJECTION, SOLUTION INTRAVENOUS at 08:11

## 2021-06-15 RX ADMIN — LIDOCAINE HYDROCHLORIDE,EPINEPHRINE BITARTRATE 3 ML: 15; .005 INJECTION, SOLUTION EPIDURAL; INFILTRATION; INTRACAUDAL; PERINEURAL at 23:54

## 2021-06-15 RX ADMIN — ROPIVACAINE HYDROCHLORIDE 8 ML: 2 INJECTION, SOLUTION EPIDURAL; INFILTRATION at 23:59

## 2021-06-15 ASSESSMENT — PAIN SCALES - GENERAL: PAINLEVEL_OUTOF10: 5

## 2021-06-15 NOTE — FLOWSHEET NOTE
Sebastian Balloon out while writer was placing slight traction with  Sebastian leg plate/ anchoring tape. Will update residents after they return from sign out.

## 2021-06-15 NOTE — FLOWSHEET NOTE
Stated contractions becoming stronger. Breathing with contractions.  supportive. Supported in efforts.

## 2021-06-15 NOTE — PROGRESS NOTES
Labor Progress Note    Irene Matos is a 19 y.o. female  at 36w3d  The patient was seen and examined. Her pain is well controlled. She reports fetal movement is present, complains of contractions, denies loss of fluid, denies vaginal bleeding.        Vital Signs:  Vitals:    06/15/21 0626 06/15/21 0830 06/15/21 0935 06/15/21 1350   BP: (!) 147/90 136/76 123/75 (!) 143/84   Pulse: 97 94 103 95   Resp:  20 20 20   Temp:    97.7 °F (36.5 °C)   TempSrc:       Weight:       Height:           FHT: 130, moderate variability, accelerations present, decelerations absent  Contractions: 1-4 min    Chaperone for Intimate Exam: Chaperone was present for entire exam, Chaperone Name: Sandy Leroy RN  Cervical Exam: deferred  Pitocin: @ 0 mu/min    Membranes: Intact  Scalp Electrode in place: absent  Intrauterine Pressure Catheter in Place: absent    Interventions: Cytotec 25 PV x1 placed     Assessment/Plan:  Irene Matos is a 19 y.o. female  at 36w3d admitted for RR IOL   - GBS negative, No indication for GBS prophylaxis   - VSS, Afebrile   - cEFM/TOCO   - Sebastian balloon in place   - Cytotec 25 PV placed   - S/p Cytotec 50 mcg PO x1   - Continue to monitor closely    Attending updated and in agreement with plan    Michael Petit DO  Ob/Gyn Resident  6/15/2021, 4:45 PM

## 2021-06-15 NOTE — FLOWSHEET NOTE
Dr. Neal visited for salas bulb placement. Tolerated well. 60 ml's saline placed in the balloon at this time. Small amt bloody discharge with placement.

## 2021-06-15 NOTE — FLOWSHEET NOTE
Breathing with contractions. Stated feels cramping now and encouraged in efforts. Family at bedside.

## 2021-06-15 NOTE — H&P
OBSTETRICAL HISTORY [facility]    Date: 6/15/2021       Time: 7:06 AM   Patient Name: Charlotte Andrews     Patient : 2000  Room/Bed: 7200/9735-53    Admission Date/Time: 6/15/2021  5:59 AM      CC: Risk reducing IOL     HPI: Charlotte Andrews is a 19 y.o.  at 36w3d who presents for risk reducing IOL. Patient denies any fever, chills, N/V, headaches, vision changes, chest pain, shortness of breath, RUQ pain, abdominal pain, and increased swelling/tenderness in bilateral lower extremities. Patient denies any vaginal discharge and any urinary complaints. The patient reports fetal movement is present, denies contractions, denies loss of fluid, denies vaginal bleeding. DATING:  LMP: Patient's last menstrual period was 2020.   Estimated Date of Delivery: 21   Based on: LMP c/w US, at 13w 4/7 weeks GA    PREGNANCY RISK FACTORS:  Patient Active Problem List   Diagnosis    Fam H/O DM (pt's mom)    Maternal obesity (BMI 40)    First pregnancy    H/O headaches    Influenza vaccine declined    Rh negative state    Fetal adipex exposure    History of heavy periods    SMA carrier status    Need for Tdap vaccination    Elevated BP without diagnosis of hypertension    Tachycardia    39 weeks gestation of pregnancy        Steroids Given In This Pregnancy:  no     REVIEW OF SYSTEMS:   Constitutional: negative fever, negative chills  HEENT: negative visual disturbances, negative headaches  Respiratory: negative dyspnea, negative cough  Cardiovascular: negative chest pain,  negative palpitations  Gastrointestinal: negative abdominal pain, negative RUQ pain, negative N/V, negative diarrhea, negative constipation  Genitourinary: negative dysuria, negative vaginal discharge, negative vaginal bleeding  Dermatological: negative rash  Hematologic: negative bruising  Immunologic/Lymphatic: negative recent illness, negative recent sick contact  Musculoskeletal: negative back pain, negative myalgias, negative arthralgias  Neurological:  negative dizziness, negative weakness  Behavior/Psych: negative depression, negative anxiety    OBSTETRICAL HISTORY:   OB History    Para Term  AB Living   1 0 0 0 0 0   SAB TAB Ectopic Molar Multiple Live Births   0 0 0 0 0 0      # Outcome Date GA Lbr Jarod/2nd Weight Sex Delivery Anes PTL Lv   1 Current                PAST MEDICAL HISTORY:   has no past medical history on file. PAST SURGICAL HISTORY:   has a past surgical history that includes Christiana tooth extraction. ALLERGIES:  is allergic to bee venom. MEDICATIONS:  Prior to Admission medications    Medication Sig Start Date End Date Taking? Authorizing Provider   famotidine (PEPCID) 40 MG tablet Take 1 tablet by mouth every evening 21  Yes Abhay Barba MD   ASPIRIN LOW DOSE 81 MG EC tablet take 1 tablet by mouth once daily 21  Yes See-Yin So, DO   Prenatal Vit-Fe Fumarate-FA (PRENATAL PO) Take by mouth   Yes Historical Provider, MD   vitamin B-6 (B-6) 25 MG tablet Take 1 tablet by mouth 2 times daily 20  Yes See-Yin So, DO   doxyLAMINE succinate (GNP SLEEP AID) 25 MG tablet Take 1 tablet by mouth nightly 20  Yes See-Yin So, DO   triamcinolone (KENALOG) 0.025 % cream Apply Topically  Patient not taking: Reported on 2020 10/20/20   Jagjit Prakash MD   EPINEPHrine (EPIPEN) 0.3 MG/0.3ML SOAJ injection Inject 0.3 mLs into the muscle once for 1 dose Use as directed for allergic reaction 18  ABBEY Martinez - AFTAB       FAMILY HISTORY:  family history includes Diabetes type 2  in her mother. SOCIAL HISTORY:   reports that she has never smoked. She has never used smokeless tobacco. She reports previous alcohol use. She reports that she does not use drugs.     VITALS:  Vitals:    06/15/21 0625 06/15/21 0626   BP: (!) 147/90 (!) 147/90   Pulse: 97 97   Resp: 18    Temp: 98.6 °F (37 °C) TempSrc: Oral    Weight: 274 lb (124.3 kg)    Height: 5' 7\" (1.702 m)          PHYSICAL EXAM:  Fetal Heart Monitor:  Baseline Heart Rate 135, moderate variability, present accelerations, absent decelerations  [place]: none contractions    General appearance:  no apparent distress, alert, and cooperative  HEENT: head atraumatic, normocephalic, moist mucous membranes, trachea midline  Neurologic:  alert, oriented, normal speech, no focal findings or movement disorder noted  Lungs:  No increased work of breathing, good air exchange, clear to auscultation bilaterally, no crackles or wheezing  Heart:  regular rate and rhythm and no murmur    Abdomen:  soft, gravid, non-tender, no rebound, guarding, or rigidity, and no RUQ or epigastric tenderness  Extremities:  no calf tenderness, non edematous, DTR's: +2/4 bilateral lower extremities   Musculoskeletal: Gross strength equal and intact throughout, no gross abnormalities, range of motion normal in hips, knees, shoulders and spine, CVA tenderness: none  Psychiatric: Mood appropriate, normal affect   Rectal Exam: not indicated  Sterile Vaginal Exam:  Cervix: No cervical motion tenderness   Uterus: Is gravid, Normal size, shape, consistency and non-tender   Adnexa: Non-tender, no palpable masses  Cervix: 1 cm dilated, 50 % effaced, -3 station, posterior position (out of 3 station), medium consistency, FETAL POSITION: Cephalic (confirmed by ultrasound), Membranes intact,    Bishops Score: 4     0 1 2 3   Position Posterior Intermediate Anterior -   Consistency Firm Intermediate Soft -   Effacement 0-30% 31-50% 51-80% >80%   Dilation 0cm 1-2cm 3-4cm >5cm   Fetal Station -3 -2 -1, 0 +1, +2             OMM EXAM:  Chief Complaint: Pregnancy  Reason for No Exam (if applicable): not applicable  Anterior/ Posterior Spinal Curves: Lumbar Lordosis -  Slightly increased  Assessment Tool: T= Tenderness, A= Asymmetry, R= Restricted Motion (A=Active, P=Passive), T=Tissue Texture Changes  Region Evaluated : Severity / Specific of Major Somatic Dysfunction: M99.03 Lumbar -  Minor TART  Major Correlations with: Gravid  Structural Diagnosis: Lumbar lordosis slightly increased 2/2 pregnancy  Treatment Plan: Outpatient       LIMITED BEDSIDE US:  Position: Cephalic  Placental Location: posterior  Fetal Heart Tones: Present  Fetal Movement: Present  Amniotic Fluid Index/Volume: >2x2 cm MVP  Estimated Fetal Weight:  7 lbs 11oz    PRENATAL LAB RESULTS:   Blood Type/Rh: A neg  Antibody Screen: negative  Hemoglobin, Hematocrit, Platelets: 83.9 / 01.3 / 271  Rubella: immune  T. Pallidum, IgG: non-reactive   Hepatitis B Surface Antigen: non-reactive   HIV: non-reactive   Sickle Cell Screen: not available  Gonorrhea: negative  Chlamydia: negative  Urine culture: negative, date: 20    Early 1 hour Glucose Tolerance Test: 107  1 hour Glucose Tolerance Test: 128    Group B Strep: negative  Cystic Fibrosis Screen: negative  First Trimester Screen: not available  MSAFP/Multiple Markers: normal  Non-Invasive Prenatal Testing: no aneuploidy detected  Anatomy US: posterior placenta, echogenic focus in left ventricle remainder normal female anatomy, 3vc, normal cord insertion    ASSESSMENT & PLAN:  Rambo Gallardo is a 19 y.o. female  at 39w1d IUP   - GBS negative / Rh negative / R immune   - No indication for GBS prophylaxis      Risk reducing IOL  - Admit to labor and delivery under the service of Dr. Sergio Cunha   - VSS, initial blood pressure elevated. PreE labs ordered. Patient denies s/s preeclampsia   - cEFM and TOCO   - Cat 1 FHT and TOCO showing no contractions   - CBC, T&S, T.Pal, COVID ordered   - UDS ordered.  R/B/A discussed with patient and patient agreeable   - IVF: LR @ 125mL/hr   - Plan of Induction: Cytotec 50 PO x1   - Continue expectant management       Fetal adipex exposure   - Fetal echo wnl      Echogenic focus left ventricle   - Seen on MFM US 21   - NIPT wnl      SMA carrier (FOB neg)      Hx tachycardia   - No tachycardia noted upon presentation   - EKG 6/1/21   - Possible POTS with some possible presyncopal episodes   - Clinically asymptomatic today      Hx headaches   - Stable on no meds   - Clinically asymptomatic      FHx DM   - Early 1hr and 1hr GTT wnl      BMI 42.9    Patient Active Problem List    Diagnosis Date Noted    39 weeks gestation of pregnancy 06/15/2021    Tachycardia 06/01/2021     And possible presyncopal episode, suspect POTS  Baseline EKG wnl 6/1/21      Need for Tdap vaccination 04/20/2021     Given 4/20/21      Elevated BP without diagnosis of hypertension 04/20/2021     140/85 on 4/20/21 (31w1d)  143/90 on 5/25/21 (36w1d)  If another elevated BP >4hr and <7d apart, pt will meet criteria for gHTN      SMA carrier status 03/23/2021     SMN1+ carrier  Negative FOB testing      Fetal adipex exposure 01/22/2021     For 1-2 weeks in October      History of heavy periods 01/22/2021     OCPs in the past caused worsening acne and weight gain      Rh negative state 01/11/2021     Rhogam given 3/31/21        Fam H/O DM (pt's mom) 12/18/2020     Early 1h       Maternal obesity (BMI 37) 12/18/2020     Early 1h   1 hr       First pregnancy 12/18/2020     Declined genetic screening  Will need ASA 81mg daily for PreE prophylaxis after 12 wks      H/O headaches 12/18/2020     Usually treats with advil outside of pregnancy      Influenza vaccine declined 12/18/2020 12/18/20         Plan discussed with Dr. Avelino Bonilla, who is agreeable. Steroids given this admission: No    Risks, benefits, alternatives and possible complications have been discussed in detail with the patient. Admission, and post admission procedures and expectations were discussed in detail. All questions were answered.     Attending's Name: Dr. Aris Person DO  Ob/Gyn Resident  6/15/2021, 7:06 AM

## 2021-06-15 NOTE — DISCHARGE SUMMARY
Obstetric Discharge Summary  [facility]    Patient Name: Kelsey aC  Patient : 2000  Primary Care Physician: ABBEY Flores NP  Admit Date: 6/15/2021    Principal Diagnosis: IUP at 39w1d, admitted for risk reducing Induction of Labor     Her pregnancy has been complicated by:   Patient Active Problem List   Diagnosis    Fam H/O DM (pt's mom)    Maternal obesity (BMI 40)    First pregnancy    H/O headaches    Influenza vaccine declined    Rh negative state    Fetal adipex exposure    History of heavy periods    SMA carrier status    Need for Tdap vaccination    Elevated BP without diagnosis of hypertension    Tachycardia    39 weeks gestation of pregnancy    Pre-eclampsia w/o SF      19 F Apg 8/9 Wt 7#8        Infection Present?: No  Hospital Acquired: n/a    Surgical Operations & Procedures:  [x] Pitocin Induction of Labor  [] Pitocin Augmentation of Labor  [x] Prostaglandin Induction of Labor  [x] Mechanical Induction of Labor  [] Artificial Rupture of Membranes  [x] Intrauterine Pressure Catheter  [] Fetal Scalp Electrode  [] Amnioinfusion  Analgesia: epidural  Delivery Type: Spontaneous Vaginal Delivery: See Labor and Delivery Summary   Laceration(s): midline first degree perineal and left labial repaired with 3-0 Vicryl    Consultations: Anesthesia        Pertinent Findings & Procedures:   Kelsey Ca is a 19 y.o. female  at 36w3d admitted for RR IOL; received Cytotec 50 PO x1, salas balloon, Cytotec 25 PV x1, Nubain x1, epidural, Pitocin, SROM (clear),  IUPC, Nitrous. She met criteria for pre eclampsia with severe features during admission. Pre E labs were normal on admission with P/C of 0.36. Patient received magnesium sulfate 4g bolus followed by 2g/hr x24 hours postpartum. IV 20mg Labetalol x1. She delivered by spontaneous vaginal a Live Born infant on 21.        Information for the patient's :  [facility] [[id number]]   female   Birth Weight: 7 lb 8.6 oz (3.42 kg)       Apgars: 8 at 1 minute and 9 at 5 minutes.      Postpartum course: normal.      Course of patient: complicated by new dx of pre eclampsia without severe features     Discharge to: Home    Readmission planned: no     Recommendations on Discharge:     Medications:      Medication List        START taking these medications      docusate sodium 100 MG capsule  Commonly known as: Colace  Take 1 capsule by mouth 2 times daily as needed for Constipation     ferrous sulfate 325 (65 Fe) MG tablet  Commonly known as: IRON 325  Take 1 tablet by mouth 2 times daily     ibuprofen 800 MG tablet  Commonly known as: ADVIL;MOTRIN  Take 1 tablet by mouth every 8 hours as needed for Pain            CONTINUE taking these medications      doxyLAMINE succinate 25 MG tablet  Commonly known as: GNP SLEEP AID  Take 1 tablet by mouth nightly     EPINEPHrine 0.3 MG/0.3ML Soaj injection  Commonly known as: EPIPEN  Inject 0.3 mLs into the muscle once for 1 dose Use as directed for allergic reaction     famotidine 40 MG tablet  Commonly known as: PEPCID  Take 1 tablet by mouth every evening     PRENATAL PO     pyridoxine 25 MG tablet  Commonly known as: B-6  Take 1 tablet by mouth 2 times daily     triamcinolone 0.025 % cream  Commonly known as: KENALOG  Apply Topically            STOP taking these medications      Aspirin Low Dose 81 MG EC tablet  Generic drug: aspirin               Where to Get Your Medications        You can get these medications from any pharmacy    Bring a paper prescription for each of these medications  docusate sodium 100 MG capsule  ferrous sulfate 325 (65 Fe) MG tablet  ibuprofen 800 MG tablet           Activity: pelvic rest x 6 weeks, no driving while on narcotics, no lifting greater than 15 lbs  Diet: regular diet  Follow up: 1 week fir blood pressure check     Condition on discharge: stable    Discharge date: 6/18/21    My Romero DO  Ob/Gyn Resident    Comments:  Home care and follow-up care were reviewed. Pelvic rest, and birth control were reviewed. Signs and symptoms of mastitis and post partum depression were reviewed. The patient is to notify her physician if any of these occur. The patient was counseled on secondary smoke risks and the increased risk of sudden infant death syndrome and respiratory problems to her baby with exposure. She was counseled on various alternate recommendations to decrease the exposure to secondary smoke to her children. Attending Physician Statement  I have discussed the care of Danae Larson, including pertinent history and exam findings,  with the resident. I have reviewed the key elements of all parts of the encounter with the resident. I agree with the assessment, plan and orders as documented by the resident.   (GE Modifier)    Electronically signed by Mick Gaona MD at 10:38 AM 06/18/21

## 2021-06-16 PROCEDURE — 6370000000 HC RX 637 (ALT 250 FOR IP): Performed by: STUDENT IN AN ORGANIZED HEALTH CARE EDUCATION/TRAINING PROGRAM

## 2021-06-16 PROCEDURE — 7200000001 HC VAGINAL DELIVERY

## 2021-06-16 PROCEDURE — 3E0P7VZ INTRODUCTION OF HORMONE INTO FEMALE REPRODUCTIVE, VIA NATURAL OR ARTIFICIAL OPENING: ICD-10-PCS | Performed by: OBSTETRICS & GYNECOLOGY

## 2021-06-16 PROCEDURE — 0HQ9XZZ REPAIR PERINEUM SKIN, EXTERNAL APPROACH: ICD-10-PCS | Performed by: OBSTETRICS & GYNECOLOGY

## 2021-06-16 PROCEDURE — 2500000003 HC RX 250 WO HCPCS: Performed by: STUDENT IN AN ORGANIZED HEALTH CARE EDUCATION/TRAINING PROGRAM

## 2021-06-16 PROCEDURE — 6360000002 HC RX W HCPCS: Performed by: STUDENT IN AN ORGANIZED HEALTH CARE EDUCATION/TRAINING PROGRAM

## 2021-06-16 PROCEDURE — 96374 THER/PROPH/DIAG INJ IV PUSH: CPT

## 2021-06-16 PROCEDURE — 1220000000 HC SEMI PRIVATE OB R&B

## 2021-06-16 PROCEDURE — 3700000025 EPIDURAL BLOCK: Performed by: ANESTHESIOLOGY

## 2021-06-16 PROCEDURE — 2500000003 HC RX 250 WO HCPCS: Performed by: ANESTHESIOLOGY

## 2021-06-16 PROCEDURE — 96375 TX/PRO/DX INJ NEW DRUG ADDON: CPT

## 2021-06-16 PROCEDURE — 88307 TISSUE EXAM BY PATHOLOGIST: CPT

## 2021-06-16 PROCEDURE — 6360000002 HC RX W HCPCS

## 2021-06-16 PROCEDURE — 10907ZC DRAINAGE OF AMNIOTIC FLUID, THERAPEUTIC FROM PRODUCTS OF CONCEPTION, VIA NATURAL OR ARTIFICIAL OPENING: ICD-10-PCS | Performed by: OBSTETRICS & GYNECOLOGY

## 2021-06-16 PROCEDURE — 2580000003 HC RX 258: Performed by: STUDENT IN AN ORGANIZED HEALTH CARE EDUCATION/TRAINING PROGRAM

## 2021-06-16 PROCEDURE — 59400 OBSTETRICAL CARE: CPT | Performed by: STUDENT IN AN ORGANIZED HEALTH CARE EDUCATION/TRAINING PROGRAM

## 2021-06-16 PROCEDURE — 3E033VJ INTRODUCTION OF OTHER HORMONE INTO PERIPHERAL VEIN, PERCUTANEOUS APPROACH: ICD-10-PCS | Performed by: OBSTETRICS & GYNECOLOGY

## 2021-06-16 PROCEDURE — 59050 FETAL MONITOR W/REPORT: CPT

## 2021-06-16 PROCEDURE — 2500000003 HC RX 250 WO HCPCS: Performed by: NURSE ANESTHETIST, CERTIFIED REGISTERED

## 2021-06-16 PROCEDURE — 96376 TX/PRO/DX INJ SAME DRUG ADON: CPT

## 2021-06-16 RX ORDER — ACETAMINOPHEN 500 MG
1000 TABLET ORAL EVERY 6 HOURS PRN
Status: DISCONTINUED | OUTPATIENT
Start: 2021-06-16 | End: 2021-06-18 | Stop reason: HOSPADM

## 2021-06-16 RX ORDER — KETOROLAC TROMETHAMINE 30 MG/ML
INJECTION, SOLUTION INTRAMUSCULAR; INTRAVENOUS
Status: COMPLETED
Start: 2021-06-16 | End: 2021-06-16

## 2021-06-16 RX ORDER — LIDOCAINE HYDROCHLORIDE AND EPINEPHRINE 15; 5 MG/ML; UG/ML
INJECTION, SOLUTION EPIDURAL PRN
Status: DISCONTINUED | OUTPATIENT
Start: 2021-06-15 | End: 2021-06-16 | Stop reason: SDUPTHER

## 2021-06-16 RX ORDER — SODIUM CHLORIDE 0.9 % (FLUSH) 0.9 %
10 SYRINGE (ML) INJECTION EVERY 12 HOURS SCHEDULED
Status: DISCONTINUED | OUTPATIENT
Start: 2021-06-16 | End: 2021-06-16

## 2021-06-16 RX ORDER — SODIUM CHLORIDE 0.9 % (FLUSH) 0.9 %
10 SYRINGE (ML) INJECTION PRN
Status: DISCONTINUED | OUTPATIENT
Start: 2021-06-16 | End: 2021-06-16

## 2021-06-16 RX ORDER — MAGNESIUM SULFATE HEPTAHYDRATE 40 MG/ML
4000 INJECTION, SOLUTION INTRAVENOUS ONCE
Status: COMPLETED | OUTPATIENT
Start: 2021-06-16 | End: 2021-06-16

## 2021-06-16 RX ORDER — ROPIVACAINE HYDROCHLORIDE 2 MG/ML
INJECTION, SOLUTION EPIDURAL; INFILTRATION; PERINEURAL CONTINUOUS PRN
Status: DISCONTINUED | OUTPATIENT
Start: 2021-06-15 | End: 2021-06-16 | Stop reason: SDUPTHER

## 2021-06-16 RX ORDER — LIDOCAINE HYDROCHLORIDE 20 MG/ML
INJECTION, SOLUTION EPIDURAL; INFILTRATION; INTRACAUDAL; PERINEURAL PRN
Status: DISCONTINUED | OUTPATIENT
Start: 2021-06-16 | End: 2021-06-16 | Stop reason: SDUPTHER

## 2021-06-16 RX ORDER — SODIUM CHLORIDE, SODIUM LACTATE, POTASSIUM CHLORIDE, CALCIUM CHLORIDE 600; 310; 30; 20 MG/100ML; MG/100ML; MG/100ML; MG/100ML
INJECTION, SOLUTION INTRAVENOUS CONTINUOUS
Status: DISCONTINUED | OUTPATIENT
Start: 2021-06-16 | End: 2021-06-16

## 2021-06-16 RX ORDER — SODIUM CHLORIDE 9 MG/ML
25 INJECTION, SOLUTION INTRAVENOUS PRN
Status: DISCONTINUED | OUTPATIENT
Start: 2021-06-16 | End: 2021-06-16

## 2021-06-16 RX ORDER — ROPIVACAINE HYDROCHLORIDE 2 MG/ML
INJECTION, SOLUTION EPIDURAL; INFILTRATION; PERINEURAL PRN
Status: DISCONTINUED | OUTPATIENT
Start: 2021-06-15 | End: 2021-06-16 | Stop reason: SDUPTHER

## 2021-06-16 RX ORDER — EPHEDRINE SULFATE 50 MG/ML
5 INJECTION INTRAVENOUS PRN
Status: DISCONTINUED | OUTPATIENT
Start: 2021-06-16 | End: 2021-06-16

## 2021-06-16 RX ORDER — EPHEDRINE SULFATE 50 MG/ML
INJECTION INTRAVENOUS
Status: DISCONTINUED
Start: 2021-06-16 | End: 2021-06-16 | Stop reason: WASHOUT

## 2021-06-16 RX ORDER — LIDOCAINE HYDROCHLORIDE 10 MG/ML
INJECTION, SOLUTION INFILTRATION; PERINEURAL
Status: DISCONTINUED
Start: 2021-06-16 | End: 2021-06-16 | Stop reason: WASHOUT

## 2021-06-16 RX ORDER — LANOLIN ALCOHOL/MO/W.PET/CERES
325 CREAM (GRAM) TOPICAL
Status: DISCONTINUED | OUTPATIENT
Start: 2021-06-17 | End: 2021-06-18 | Stop reason: HOSPADM

## 2021-06-16 RX ORDER — ONDANSETRON 4 MG/1
4 TABLET, ORALLY DISINTEGRATING ORAL EVERY 6 HOURS PRN
Status: DISCONTINUED | OUTPATIENT
Start: 2021-06-16 | End: 2021-06-18 | Stop reason: HOSPADM

## 2021-06-16 RX ORDER — LANOLIN 72 %
OINTMENT (GRAM) TOPICAL PRN
Status: DISCONTINUED | OUTPATIENT
Start: 2021-06-16 | End: 2021-06-18 | Stop reason: HOSPADM

## 2021-06-16 RX ORDER — CALCIUM GLUCONATE 94 MG/ML
1000 INJECTION, SOLUTION INTRAVENOUS PRN
Status: DISCONTINUED | OUTPATIENT
Start: 2021-06-16 | End: 2021-06-18 | Stop reason: HOSPADM

## 2021-06-16 RX ORDER — LABETALOL HYDROCHLORIDE 5 MG/ML
20 INJECTION, SOLUTION INTRAVENOUS ONCE
Status: COMPLETED | OUTPATIENT
Start: 2021-06-16 | End: 2021-06-16

## 2021-06-16 RX ORDER — SEVOFLURANE 250 ML/250ML
1 LIQUID RESPIRATORY (INHALATION) CONTINUOUS PRN
Status: DISCONTINUED | OUTPATIENT
Start: 2021-06-16 | End: 2021-06-16

## 2021-06-16 RX ORDER — DOCUSATE SODIUM 100 MG/1
100 CAPSULE, LIQUID FILLED ORAL 2 TIMES DAILY
Status: DISCONTINUED | OUTPATIENT
Start: 2021-06-16 | End: 2021-06-18 | Stop reason: HOSPADM

## 2021-06-16 RX ORDER — IBUPROFEN 600 MG/1
600 TABLET ORAL EVERY 6 HOURS
Status: DISCONTINUED | OUTPATIENT
Start: 2021-06-16 | End: 2021-06-18 | Stop reason: HOSPADM

## 2021-06-16 RX ORDER — SODIUM CHLORIDE, SODIUM LACTATE, POTASSIUM CHLORIDE, CALCIUM CHLORIDE 600; 310; 30; 20 MG/100ML; MG/100ML; MG/100ML; MG/100ML
INJECTION, SOLUTION INTRAVENOUS CONTINUOUS
Status: DISCONTINUED | OUTPATIENT
Start: 2021-06-16 | End: 2021-06-17

## 2021-06-16 RX ORDER — KETOROLAC TROMETHAMINE 30 MG/ML
30 INJECTION, SOLUTION INTRAMUSCULAR; INTRAVENOUS ONCE
Status: COMPLETED | OUTPATIENT
Start: 2021-06-16 | End: 2021-06-16

## 2021-06-16 RX ADMIN — Medication 166.7 ML: at 13:40

## 2021-06-16 RX ADMIN — EPHEDRINE SULFATE 5 MG: 50 INJECTION, SOLUTION INTRAVENOUS at 06:07

## 2021-06-16 RX ADMIN — ROPIVACAINE HYDROCHLORIDE 10 ML/HR: 2 INJECTION, SOLUTION EPIDURAL; INFILTRATION at 07:58

## 2021-06-16 RX ADMIN — EPHEDRINE SULFATE 5 MG: 50 INJECTION, SOLUTION INTRAVENOUS at 02:47

## 2021-06-16 RX ADMIN — Medication 1 MILLI-UNITS/MIN: at 01:30

## 2021-06-16 RX ADMIN — LIDOCAINE HYDROCHLORIDE 5 ML: 20 INJECTION, SOLUTION EPIDURAL; INFILTRATION; INTRACAUDAL; PERINEURAL at 09:22

## 2021-06-16 RX ADMIN — KETOROLAC TROMETHAMINE 30 MG: 30 INJECTION, SOLUTION INTRAMUSCULAR; INTRAVENOUS at 14:13

## 2021-06-16 RX ADMIN — Medication: at 20:19

## 2021-06-16 RX ADMIN — MAGNESIUM SULFATE HEPTAHYDRATE 2000 MG/HR: 40 INJECTION, SOLUTION INTRAVENOUS at 22:53

## 2021-06-16 RX ADMIN — ACETAMINOPHEN 1000 MG: 500 TABLET ORAL at 17:18

## 2021-06-16 RX ADMIN — MAGNESIUM SULFATE IN WATER 4000 MG: 40 INJECTION, SOLUTION INTRAVENOUS at 00:50

## 2021-06-16 RX ADMIN — MAGNESIUM SULFATE HEPTAHYDRATE 2000 MG/HR: 40 INJECTION, SOLUTION INTRAVENOUS at 11:21

## 2021-06-16 RX ADMIN — SODIUM CHLORIDE, POTASSIUM CHLORIDE, SODIUM LACTATE AND CALCIUM CHLORIDE: 600; 310; 30; 20 INJECTION, SOLUTION INTRAVENOUS at 17:04

## 2021-06-16 RX ADMIN — DOCUSATE SODIUM 100 MG: 100 CAPSULE ORAL at 20:19

## 2021-06-16 RX ADMIN — Medication 20 MG: at 00:24

## 2021-06-16 RX ADMIN — SODIUM CHLORIDE, POTASSIUM CHLORIDE, SODIUM LACTATE AND CALCIUM CHLORIDE: 600; 310; 30; 20 INJECTION, SOLUTION INTRAVENOUS at 00:55

## 2021-06-16 RX ADMIN — IBUPROFEN 600 MG: 600 TABLET, FILM COATED ORAL at 20:19

## 2021-06-16 RX ADMIN — MAGNESIUM SULFATE HEPTAHYDRATE 2000 MG/HR: 40 INJECTION, SOLUTION INTRAVENOUS at 01:10

## 2021-06-16 ASSESSMENT — PAIN SCALES - GENERAL
PAINLEVEL_OUTOF10: 2
PAINLEVEL_OUTOF10: 3
PAINLEVEL_OUTOF10: 3
PAINLEVEL_OUTOF10: 1

## 2021-06-16 ASSESSMENT — PAIN DESCRIPTION - PAIN TYPE: TYPE: ACUTE PAIN

## 2021-06-16 ASSESSMENT — PAIN DESCRIPTION - LOCATION: LOCATION: ABDOMEN

## 2021-06-16 NOTE — PROGRESS NOTES
Resident Interval Magnesium Sulfate Note    Tatyana Tellez is a 19 y.o. female  PPD# 0 w/ PreE w/SF's  The patient is resting comfortably. She denies headache, visual changes and abdominal pain in the right upper quadrant. She denies any shortness of breath or chest pain. She denies change in her extremities, regarding swelling.     Continuous Medications:    oxytocin 87.3 kami-units/min (21 1351)    magnesium sulfate 2,000 mg/hr (21 1121)    lactated ringers 75 mL/hr at 21 1704       Vitals:    Vitals:    21 1555 21 1700 21 1800 21 1911   BP: 104/64 112/71 103/71 100/65   Pulse: 100 97 97 100   Resp:    Temp: 98.8 °F (37.1 °C)      TempSrc:       SpO2: 98% 99% 99% 97%   Weight:       Height:             Physical Exam:  Chest: clear to auscultation bilaterally  Heart: RRR no murmur  Abdomen: soft, nontender, nondistended  Extremities: DTR normal Right: 2/4   Left: 2/4  Clonus: absent    Urine Output: 225mL/hr; Clear urine    Labs:  Last Magnesium Level:   No results found for: MG    BMP:    Recent Labs     06/15/21  0808   *   K 3.7      CO2 21   BUN 7   CREATININE 0.35*   GLUCOSE 90       ASSESSMENT/PLAN  Tatyana Tellez is a 19 y.o. female  PPD#0 PreE w/ SF's   - Continue Magnesium Sulfate Treatment 2g/hr, off @ 1400 on 21   - No Mag levels q6hrs per provider   - BPs normotensive   - Patient denies any s/s PreE   - UOP adequate   - IV Labetalol 20 x1 ( @ 0024)               - PreE labs wnl x1, P/C 0.36 (6/15)    - Continue to monitor closely    Jennifer Crespo DO  Ob/Gyn Resident  2021, 7:21 PM

## 2021-06-16 NOTE — FLOWSHEET NOTE
Patient admitted to room 734 from L/D via wheelchair. Oriented to room and surroundings. Plan of care reviewed. Verbalized understanding. Instructed on infant security and safe sleep practices. Preventing falls education provided . The following handouts given: A New Beginning: Your Guide to Postpartum Care, Rounding, gs Security System,Babies Cry A lot, Safe Sleep, Security and Visitation Guidelines. Call light placed within reach.

## 2021-06-16 NOTE — FLOWSHEET NOTE
Rubens Holliday CRNA 2340 at bedside. Epidural procedure explained, risks discussed. Pt verbalizes consent for epidural.   2330 patient positioned for epidural.2330 Time out completed. 2351 catheter placed. 2354 test dose given. Epidural catheter taped and secured per anesthesia. 2359 to low fowlers with left uterine displacement. 2356 loading dose given. 0002 pump initiated. Pt tolerated procedure well.

## 2021-06-16 NOTE — ANESTHESIA PRE PROCEDURE
Department of Anesthesiology  Preprocedure Note       Name:  Damien Colby   Age:  19 y.o.  :  2000                                          MRN:  [id number]         Date:  6/15/2021      Surgeon: * No surgeons listed *    Department of Anesthesiology  Pre-Anesthesia Evaluation/Consultation         Name:  Damien Colby                                         Age:  19 y.o.   MRN:  [id number]           Procedure (Scheduled):  Epidural  Surgeon:  Dr. Moya    Medications  Current Facility-Administered Medications   Medication Dose Route Frequency Provider Last Rate Last Admin    lactated ringers infusion   Intravenous Continuous Arvil Lalita,  mL/hr at 06/15/21 1558 New Bag at 06/15/21 1558    lactated ringers bolus  500 mL Intravenous PRN Arvil Lalita, DO        Or    lactated ringers bolus  1,000 mL Intravenous PRN Arvil Lalita,  mL/hr at 06/15/21 2305 1,000 mL at 06/15/21 2305    sodium chloride flush 0.9 % injection 5-40 mL  5-40 mL Intravenous 2 times per day Arvil Lalita, DO        sodium chloride flush 0.9 % injection 5-40 mL  5-40 mL Intravenous PRN Arvil Lalita, DO        0.9 % sodium chloride infusion  25 mL Intravenous PRN Arvil Lalita, DO        lidocaine PF 1 % injection 30 mL  30 mL Other PRN Arvil Lalita, DO        ondansetron [facility]) injection 4 mg  4 mg Intravenous Q6H PRN Arvil Lalita, DO        diphenhydrAMINE (BENADRYL) tablet 25 mg  25 mg Oral Q4H PRN Arvil Lalita, DO        acetaminophen (TYLENOL) tablet 1,000 mg  1,000 mg Oral Q6H PRN Arvil Lalita, DO        benzocaine-menthol (DERMOPLAST) 20-0.5 % spray   Topical PRN Arvil Lalita, DO        oxytocin (PITOCIN) 30 units in 500 mL infusion  1-20 kami-units/min Intravenous Continuous Arvil Lalita, DO        ropivacaine (NAROPIN) 0.2% injection 0.2%                Allergies   Allergen Reactions    Bee Venom Swelling     Patient Active Problem List   Diagnosis    Fam H/O DM (pt's mom)    Maternal obesity (BMI 40)    First pregnancy    H/O headaches    Influenza vaccine declined    Rh negative state    Fetal adipex exposure    History of heavy periods    SMA carrier status    Need for Tdap vaccination    Elevated BP without diagnosis of hypertension    Tachycardia    39 weeks gestation of pregnancy    Pre-eclampsia w/o SF      No past medical history on file. Past Surgical History:   Procedure Laterality Date    WISDOM TOOTH EXTRACTION       Social History     Tobacco Use    Smoking status: Never Smoker    Smokeless tobacco: Never Used   Vaping Use    Vaping Use: Never used   Substance Use Topics    Alcohol use: Not Currently    Drug use: No         Vital Signs (Current)   Vitals:    06/15/21 2000   BP: (!) 141/55   Pulse: 119   Resp: 20   Temp: 37.2 °C (98.9 °F)     Vital Signs Statistics (for past 48 hrs)     Temp  Av.9 °C (98.4 °F)  Min: 36.5 °C (97.7 °F)   Min taken time: 06/15/21 1350  Max: 37.2 °C (98.9 °F)   Max taken time: 06/15/21 2000  Pulse  Av.4  Min: 80   Min taken time: 06/15/21 0830  Max: 80   Max taken time: 06/15/21 2000  Resp  Av  Min: 25   Min taken time: 06/15/21 0625  Max: 22   Max taken time: 06/15/21 1645  BP  Min: 123/75   Min taken time: 06/15/21 0935  Max: 147/90   Max taken time: 06/15/21 0626  BP Readings from Last 3 Encounters:   06/15/21 (!) 141/55   21 125/86   21 132/89       BMI  Body mass index is 42.91 kg/m².     CBC   Lab Results   Component Value Date    WBC 16.0 06/15/2021    RBC 4.17 06/15/2021    HGB 9.9 06/15/2021    HCT 32.4 06/15/2021    MCV 77.7 06/15/2021    RDW 14.7 06/15/2021     06/15/2021       CMP    Lab Results   Component Value Date     06/15/2021    K 3.7 06/15/2021     06/15/2021    CO2 21 06/15/2021    BUN 7 06/15/2021    CREATININE 0.35 06/15/2021    GFRAA >60 06/15/2021    LABGLOM >60 06/15/2021    GLUCOSE 90 06/15/2021    PROT 5.9 06/15/2021    CALCIUM 8.6 06/15/2021    BILITOT 0.25 06/15/2021    ALKPHOS 142 06/15/2021    AST 18 06/15/2021    ALT 11 06/15/2021       BMP    Lab Results   Component Value Date     06/15/2021    K 3.7 06/15/2021     06/15/2021    CO2 21 06/15/2021    BUN 7 06/15/2021    CREATININE 0.35 06/15/2021    CALCIUM 8.6 06/15/2021    GFRAA >60 06/15/2021    LABGLOM >60 06/15/2021    GLUCOSE 90 06/15/2021       POC Testing  No results for input(s): POCGLU, POCNA, POCK, POCCL, POCBUN, POCHEMO, POCHCT in the last 72 hours. Coags  No results found for: PROTIME, INR, APTT    HCG (If Applicable)   Lab Results   Component Value Date    PREGTESTUR positive 12/18/2020        ABGs No results found for: PHART, PO2ART, MRK7QDD, KSL4SZN, BEART, Z3ZVLVKA     Type & Screen (If Applicable)  No results found for: LABABO, [address]    Radiology (If Applicable)    Cardiac Testing (If Applicable)     EKG (If Applicable)           Procedure: * No procedures listed *    Medications prior to admission:   Prior to Admission medications    Medication Sig Start Date End Date Taking?  Authorizing Provider   famotidine (PEPCID) 40 MG tablet Take 1 tablet by mouth every evening 5/5/21  Yes Indiana Gutierrez MD   ASPIRIN LOW DOSE 81 MG EC tablet take 1 tablet by mouth once daily 4/23/21  Yes See-Yin So, DO   Prenatal Vit-Fe Fumarate-FA (PRENATAL PO) Take by mouth   Yes Historical Provider, MD   vitamin B-6 (B-6) 25 MG tablet Take 1 tablet by mouth 2 times daily 12/18/20  Yes See-Yin So, DO   doxyLAMINE succinate (GNP SLEEP AID) 25 MG tablet Take 1 tablet by mouth nightly 12/18/20  Yes See-Yin So, DO   triamcinolone (KENALOG) 0.025 % cream Apply Topically  Patient not taking: Reported on 12/18/2020 10/20/20   Gómez Concepcion MD   EPINEPHrine (EPIPEN) 0.3 MG/0.3ML SOAJ injection Inject 0.3 mLs into the muscle once for 1 dose Use as directed for allergic reaction 6/6/18 2/18/21  Saw Smith, APRN - CNP       Current medications:    Current Facility-Administered Medications   Medication Dose Route Frequency Provider Last Rate Last Admin    lactated ringers infusion   Intravenous Continuous Tori Gravely,  mL/hr at 06/15/21 1558 New Bag at 06/15/21 1558    lactated ringers bolus  500 mL Intravenous PRN Tori Gravely, DO        Or    lactated ringers bolus  1,000 mL Intravenous PRN Tori Gravely,  mL/hr at 06/15/21 2305 1,000 mL at 06/15/21 2305    sodium chloride flush 0.9 % injection 5-40 mL  5-40 mL Intravenous 2 times per day Tori Gravely, DO        sodium chloride flush 0.9 % injection 5-40 mL  5-40 mL Intravenous PRN Tori Gravely, DO        0.9 % sodium chloride infusion  25 mL Intravenous PRN Tori Gravely, DO        lidocaine PF 1 % injection 30 mL  30 mL Other PRN Tori Gravely, DO        ondansetron [facility]) injection 4 mg  4 mg Intravenous Q6H PRN Tori Gravely, DO        diphenhydrAMINE (BENADRYL) tablet 25 mg  25 mg Oral Q4H PRN Tori Gravely, DO        acetaminophen (TYLENOL) tablet 1,000 mg  1,000 mg Oral Q6H PRN Tori Gravely, DO        benzocaine-menthol (DERMOPLAST) 20-0.5 % spray   Topical PRN Tori Gravely, DO        oxytocin (PITOCIN) 30 units in 500 mL infusion  1-20 kami-units/min Intravenous Continuous Tori Gravely, DO        ropivacaine (NAROPIN) 0.2% injection 0.2%                Allergies:     Allergies   Allergen Reactions    Bee Venom Swelling       Problem List:    Patient Active Problem List   Diagnosis Code    Fam H/O DM (pt's mom) Z83.3    Maternal obesity (BMI 40) Z68.37    First pregnancy Z34.00    H/O headaches Z87.898    Influenza vaccine declined Z35.24    Rh negative state O26.899, Z67.91    Fetal adipex exposure P04.9    History of heavy periods Z87.42    SMA carrier status Z14.8    Need for Tdap vaccination Z23    Elevated BP without diagnosis of hypertension R03.0    Tachycardia R00.0    39 weeks gestation of pregnancy Z3A.39    Pre-eclampsia w/o SF  O14.90       Past Medical History:  No past medical history on file. Past Surgical History:        Procedure Laterality Date    WISDOM TOOTH EXTRACTION         Social History:    Social History     Tobacco Use    Smoking status: Never Smoker    Smokeless tobacco: Never Used   Substance Use Topics    Alcohol use: Not Currently                                Counseling given: Not Answered      Vital Signs (Current):   Vitals:    06/15/21 1350 06/15/21 1645 06/15/21 1740 06/15/21 2000   BP: (!) 143/84 132/68 (!) 143/80 (!) 141/55   Pulse: 95 106 116 119   Resp: 20 22 20 20   Temp: 36.5 °C (97.7 °F)   37.2 °C (98.9 °F)   TempSrc:    Oral   Weight:       Height:                                                  BP Readings from Last 3 Encounters:   06/15/21 (!) 141/55   06/08/21 125/86   06/01/21 132/89       NPO Status:                                                                                 BMI:   Wt Readings from Last 3 Encounters:   06/15/21 274 lb (124.3 kg)   06/08/21 275 lb (124.7 kg)   06/01/21 277 lb 9.6 oz (125.9 kg)     Body mass index is 42.91 kg/m². CBC:   Lab Results   Component Value Date    WBC 16.0 06/15/2021    RBC 4.17 06/15/2021    HGB 9.9 06/15/2021    HCT 32.4 06/15/2021    MCV 77.7 06/15/2021    RDW 14.7 06/15/2021     06/15/2021       CMP:   Lab Results   Component Value Date     06/15/2021    K 3.7 06/15/2021     06/15/2021    CO2 21 06/15/2021    BUN 7 06/15/2021    CREATININE 0.35 06/15/2021    GFRAA >60 06/15/2021    LABGLOM >60 06/15/2021    GLUCOSE 90 06/15/2021    PROT 5.9 06/15/2021    CALCIUM 8.6 06/15/2021    BILITOT 0.25 06/15/2021    ALKPHOS 142 06/15/2021    AST 18 06/15/2021    ALT 11 06/15/2021       POC Tests: No results for input(s): POCGLU, POCNA, POCK, POCCL, POCBUN, POCHEMO, POCHCT in the last 72 hours.     Coags: No results found for: PROTIME, INR, APTT    HCG (If Applicable):   Lab Results   Component Value Date PREGTESTUR positive 12/18/2020        ABGs: No results found for: PHART, PO2ART, NUU7CLP, CTC4LKW, BEART, X8CXMNPY     Type & Screen (If Applicable):  No results found for: LABABO, LABRH    Drug/Infectious Status (If Applicable):  No results found for: HIV, HEPCAB    COVID-19 Screening (If Applicable):   Lab Results   Component Value Date    COVID19 Not Detected 06/15/2021           Anesthesia Evaluation  Patient summary reviewed  Airway: Mallampati: II  TM distance: >3 FB   Neck ROM: full  Mouth opening: > = 3 FB Dental: normal exam         Pulmonary:Negative Pulmonary ROS and normal exam                               Cardiovascular:    (+) hypertension:,                   Neuro/Psych:   Negative Neuro/Psych ROS              GI/Hepatic/Renal: Neg GI/Hepatic/Renal ROS            Endo/Other: Negative Endo/Other ROS                    Abdominal:           Vascular: negative vascular ROS. Anesthesia Plan      epidural     ASA 2             Anesthetic plan and risks discussed with patient.                       ABBEY Pfeiffer - CRNA   6/15/2021

## 2021-06-16 NOTE — PROGRESS NOTES
Resident Interval Magnesium Sulfate Note    Charlotte Andrews is a 19 y.o. female  at 44w2d  The patient is resting comfortably. She denies visual changes, abdominal pain in the left upper quadrant and vaginal bleeding. She denies any shortness of breath or chest pain. She denies change in her extremities, regarding swelling. She does admit to a mild HA that has improved since last check. Continuous Medications:    lactated ringers 75 mL/hr at 21 0055    sodium chloride      magnesium sulfate 2,000 mg/hr (21 0110)    lactated ringers 125 mL/hr at 21 0018    sodium chloride      oxytocin 4 kami-units/min (21 0730)    ropivacaine 0.2% in sodium chloride 0.9% (OB) 10 mL/hr (21 0758)       Vitals:    Vitals:    21 0923 21 0925 21 0931 21 0935   BP: (!) 109/54 (!) 116/91 (!) 110/51    Pulse: 109 105 114    Resp: 18      Temp:       TempSrc:       SpO2:  97%  100%   Weight:       Height:           Fetal heart rate: Baseline Heart Rate:  135, moderate variability, accelerations: present, decelerations: absent     [place]: 2-4 min    Physical Exam:  Chest: clear to auscultation bilaterally  Heart: RRR no murmur  Abdomen: soft, nontender, gravid, no s/s chorio or abruption  Extremities: DTR normal Right: +1/4   Left: +1/4  Clonus: absent    Urine Output: 275/hr; Clear urine    Labs:  Last Magnesium Level:   No results found for: MG    BMP:    Recent Labs     06/15/21  0808   *   K 3.7      CO2 21   BUN 7   CREATININE 0.35*   GLUCOSE 90       ASSESSMENT/PLAN  Charlotte Andrews is a 19 y.o. female  at 44w2d w/ PreE w/ SFs   - Continue Magnesium Sulfate Treatment 2g/hr,   - No Mag levels q6hrs per provider   - BPs are still hypotensive.  The patient is asymptomatic    - Patient denies any s/s PreE   - UOP adeqaute   - IV Labetalol 20 x1 ( @ 0024)    - PreE labs wnl x1, P/C 0.36 (6/15)    - Continue to monitor closely    Aurora De La Torre,

## 2021-06-16 NOTE — PROGRESS NOTES
Labor Progress Note  Resident Interval Magnesium Note    Yash Jimenez is a 19 y.o. female  at 44w2d  The patient was seen and examined. The patient is resting comfortably. Her pain is well controlled. She reports fetal movement is present, complains of contractions, complains of loss of fluid, denies vaginal bleeding. She denies visual changes and abdominal pain in the right upper quadrant. She admits to a minor headache, will continue to monitor closely. She denies any shortness of breath or chest pain. She denies change in her extremities, regarding swelling.     Continuous Medications:    lactated ringers 75 mL/hr at 21 0055    sodium chloride      magnesium sulfate 2,000 mg/hr (21 0110)    lactated ringers 125 mL/hr at 21 0018    sodium chloride      oxytocin 1 kami-units/min (21 0130)    ropivacaine 0.2% in sodium chloride 0.9% (OB)           Vital Signs:  Vitals:    21 0400 21 0430 21 0500 21 0531   BP: (!) 101/50 (!) 108/50 (!) 85/37 (!) 79/32   Pulse: 101 102 107 109   Resp: 20 20 20    Temp:   99.1 °F (37.3 °C)    TempSrc:   Oral    SpO2: 99% 100% 99%    Weight:       Height:               Physical Exam:  FHT: 145, moderate variability, accelerations present, intermittent late decelerations  Contractions: regular, every 4-6 minutes    Chaperone for Intimate Exam: Chaperone was present for entire exam, Chaperone Name: Brian Jones RN  Cervical Exam: 6 cm dilated, 80 effaced, -1 station    Chest: clear to auscultation bilaterally  Heart: RRR no murmur  Abdomen: soft, nontender, gravid, no s/s chorio or abruption  Extremities: DTR normal Right: 2/4   Left: 2/4  Clonus: absent    Urine Output: 200mL/hr; Clear urine      Pitocin: @ 1 mu/min    Membranes: Ruptured meconium stained  Scalp Electrode in place: absent  Intrauterine Pressure Catheter in Place: absent    Interventions: Position changes and anesthesia for hypotension    Labs:  Last Magnesium Level:   No results found for: MG    BMP:    Recent Labs     06/15/21  0808   *   K 3.7      CO2 21   BUN 7   CREATININE 0.35*   GLUCOSE 90       Assessment/Plan:  Cher Nobles is a 19 y.o. female  at 39w2d IUP   - GBS negative, No indication for GBS prophylaxis   - Continue Magnesium Sulfate Treatment 2g/hr   - No Mag levels q6hrs per provider   - BPs hypotensive, anesthesia on floor to evaluate. Possible treatment with ephedrine.   - Patient denies any s/s PreE   - UOP adequate   - SVE: /-1   - Intermittent Cat 2 tracing due to late decelerations. Will continue position changes and closely monitor. Suspect 2/2 low blood pressures.     Ainsley Adams DO  Ob/Gyn Resident  2021, 6:07 AM

## 2021-06-16 NOTE — FLOWSHEET NOTE
SMITA Neal paged due to pt BP being low in the 70/30's. Waiting for orders. Dr Choco Alford updated as well.

## 2021-06-16 NOTE — PROGRESS NOTES
Labor Progress Note    Kayleen Phillips is a 19 y.o. female  at 36w3d  The patient was seen and examined. Her pain is well controlled. She reports fetal movement is present, complains of contractions, denies loss of fluid, denies vaginal bleeding.        Vital Signs:  Vitals:    06/15/21 1350 06/15/21 1645 06/15/21 1740 06/15/21 2000   BP: (!) 143/84 132/68 (!) 143/80 (!) 141/55   Pulse: 95 106 116 119   Resp:    Temp: 97.7 °F (36.5 °C)   98.9 °F (37.2 °C)   TempSrc:    Oral   Weight:       Height:             FHT: 130, moderate variability, accelerations present, decelerations absent  Contractions: regular, every 2 minutes    Chaperone for Intimate Exam: Chaperone was present for entire exam, Chaperone Name: Kobi Woodard RN  Cervical Exam: 4 cm dilated, 50 effaced, -2 station  Pitocin: @ 0 mu/min    Membranes: Intact  Scalp Electrode in place: absent  Intrauterine Pressure Catheter in Place: absent    Interventions: none    Assessment/Plan:  Kayleen Phillips is a 19 y.o. female  at 36w3d admitted for RR IOL   - GBS negative, No indication for GBS prophylaxis   - cEFM/TOCO   - S/p salas balloon   - S/p Cytotec 50 mcg PO x1, Cytotec 25 PV x1   - Will start pitocin if contractions space out   - SVE: /-2   - Continue expectant management    Preeclampsia w/o SF's   - Elevated blood pressures, no severe ranges   - PreE labs wnl x1, P/C 0.36 (6/15)    - Denies s/s preeclampsia        Attending updated and in agreement with plan    Lieutenant Alissa DO  Ob/Gyn Resident  6/15/2021, 9:13 PM

## 2021-06-16 NOTE — PROGRESS NOTES
Resident Interval Magnesium Sulfate Note    Charlotte Andrews is a 19 y.o. female  at 44w2d PPD#0 w/ PreE w/ SFs  The patient is resting comfortably. She denies visual changes, abdominal pain in the left upper quadrant and vaginal bleeding. She denies any shortness of breath or chest pain. She denies change in her extremities, regarding swelling. She does admit to a mild HA that has improved since last check.      Continuous Medications:    oxytocin      magnesium sulfate 2,000 mg/hr (21 1121)    lactated ringers         Vitals:    Vitals:    21 1205 21 1345 21 1404 21 1431   BP:   115/65 126/62   Pulse:   114 118   Resp:   16 16   Temp:       TempSrc:       SpO2: 99% 100%     Weight:       Height:           Physical Exam:  Chest: clear to auscultation bilaterally  Heart: RRR no murmur  Abdomen: soft, nontender, gravid, no s/s chorio or abruption  Extremities: DTR normal Right: +1/4   Left: +1/4  Clonus: absent    Urine Output: 33/hr; Clear urine    Labs:  Last Magnesium Level:   No results found for: MG    BMP:    Recent Labs     06/15/21  0808   *   K 3.7      CO2 21   BUN 7   CREATININE 0.35*   GLUCOSE 90       ASSESSMENT/PLAN  Charlotte Andrews is a 19 y.o. female  at 44w2d PPD#0 w/ PreE w/ SFs   - Continue Magnesium Sulfate Treatment 2g/hr off @1400 21   - No Mag levels q6hrs per provider   - BPs are normotensive   - Patient denies any s/s PreE   - UOP adequate    - IV Labetalol 20 x1 ( @ 0024)    - PreE labs wnl x1, P/C 0.36 (6/15)    - Continue to monitor closely    Aurora De La Torre DO  Ob/Gyn Resident  2021, 3:00 PM

## 2021-06-16 NOTE — L&D DELIVERY NOTE
Mother's Information      Labor Events     labor?: No       Mother Delivery Information    Episiotomy: None  Lacerations: 1st  Repair Suture: None  # of Repair Packets: 1  Surgical or Additional Est. Blood Loss (mL): 0 (View Only): Edit in Flowsheets   Combined Est. Blood Loss (mL): 0            Edward MOJICA [[id number]]      Labor Events     labor?: No   steroids?: None  Cervical ripening date/time: 6/15/21 08:21:00   Cervical ripening type: Misoprostol, Sebastian/EASI  Antibiotics received during labor?: No  Rupture Identifier: Sac 1   Rupture date/time: 6/15/21 22:40:00   Rupture type: Spontaneous=SROM  Fluid color: Clear  Fluid odor: None  Induction: Misoprostol  Indications for induction: Elective  Augmentation: None  Labor complications: None              Anesthesia    Method: Epidural  Analgesics: ROPIVACAINE EPIDURAL INFUSION       Assisted Delivery Details    Forceps attempted?: No  Vacuum extractor attempted?: No       Document Additional Attempt         Document Additional Attempt                 Shoulder Dystocia    Shoulder dystocia present?: No  Add Second Maneuver  Add Third Maneuver  Add Fourth Maneuver  Add Fifth Maneuver  Add Sixth Maneuver  Add Seventh Maneuver  Add Eighth Maneuver  Add Ninth Maneuver       [place] Presentation    Presentation: Vertex  Position: Left  _: Occiput  _: Anterior        Information    Head delivery date/time: 2021 13:33:00   Changing the 's delivery date/time could affect patient care.:      Delivery date/time:  21 1333   Delivery type: Vaginal, Spontaneous    Details:            Delivery Providers    Delivering clinician: Christiano Chan DO   Provider Role    Cathie Robles DO Chief Resident          Cord    Vessels: 3 Vessels  Complications: None  Delayed cord clamping?: Yes  Cord clamped date/time: 2021 1334  Cord blood disposition: Lab  Gases sent?: Yes  Stem cell collection (by provider): No       Placenta    Date/time: 2021 13:40:16  Removal: Spontaneous  Appearance: Intact  Disposition: Lab, Pathology       Delivery Resuscitation    Method: Bulb Suction, Stimulation       Apgars    Living status: Living  Apgars   1 Minute:  5 Minute:  10 Minute 15 Minute 20 Minute   Skin Color: 0  1       Heart Rate: 2  2       Reflex Irritability: 2  2       Muscle Tone: 2  2       Respiratory Effort: 2  2       Total: 8  9               Apgars Assigned Mode Workman       Skin to Skin      Skin to skin initiation date/time: 21 13:33:00 EDT   Skin to skin with:  Mother  Skin to skin end date/time:            [place] Measurements      Weight: 3420 g           Delivery Information    Episiotomy: None  Perineal lacerations: 1st Repaired: Yes     Vaginal laceration: No      Cervical laceration: No      Surgical or additional est. blood loss (mL): 0 (View Only): Edit in Flowsheets   Combined est. blood loss (mL): 0  Repair suture: None       Vaginal Delivery Counts    Initial count personnel: ALONDRA  Initial count verified by: KYE   4x4:  Needles:  Instruments:  Lap Pads:  Sponges:    Initial counts:         Final counts:         Final count personnel: Amanda Giles  Final count verified by: Malgorzata Gill  Accurate final count?: Yes  Final vaginal sweep completed: Yes       Other Procedures    Procedures: None            Vaginal Delivery Note  Department of Obstetrics and Gynecology  [facility]       Patient: Geeta Leone   : 2000  MRN: [id number]   Date of delivery: 21     Pre-operative Diagnosis: Geeta Leone  at 39w2d, Term pregnancy, Induced labor, Single fetus and Pregnancy complicated by:  PreE w/ SF (BPs, P/C, new dx)   Fetal Echogenic Focus L Ventricle   SMA Carrier (FOB neg)   Fetal Adipex Exposure   FHx DM   Anemia (Hgb 9.9)   BMI 42    Post-operative Diagnosis:  Same as above, single live born female infant     Delivering Obstetrician & Assistant(s):  Waldemar Hargrove DO; Naye Hernandez DO PGY4    Infant Information:     Apgar scores: 8 at 1 minute and 9 at 5 minutes. Anesthesia:  epidural anesthesia    Application and Delivery:    She was known to be GBS negative . The patient progressed well, received an epidural, became complete and felt the urge to push. After pushing with contractions the fetal head delivered Cephalic, left occiput anterior over an intact perineum, nuchal cord was not present. The anterior, then posterior shoulder delivered easily and atraumatically followed by the rest of the infant. Nose and mouth suctioned with bulb suction, infant was stimulated and dried. Cord was clamped and cut after one minute delayed cord clamping  and infant was placed on maternal abdomen, and attended by RN for evaluation. The delivery of the placenta was spontaneous and appeared intact, whole and that the umbilical cord had three vessels noted. Pitocin was started. The vagina was swept of all clots and debris. The perineum and vagina were evaluated and a midline 1st degree perineal laceration and right vaginal laceration were found and repaired in standard fashion with 3-0 vicryl. Mother and baby tolerated procedure well. Dr. Salvador Handler was present for entire delivery. Delivery Summary:       Specimen: placenta sent to pathology, cord blood and cord gases  Quantitative blood loss:  150ml immediately following delivery  Condition:  infant stable to general nursery and mother stable  Counts: instrument and sponge counts correct  Blood Type and Rh: A NEGATIVE    Rubella Immunity Status: immune    Roel Cleveland DO  Ob/Gyn Resident  2021, 2:07 PM    Date: 2021  Time: 2:34 PM    Attending Attestation:   I was present and scrubbed for the entire procedure.     Attending Name: Waldemar Hargrove DO

## 2021-06-16 NOTE — PROGRESS NOTES
Labor Progress Note    Asiya Montanez is a 19 y.o. female  at 44w2d  The patient was seen and examined. Her pain is well controlled. She reports fetal movement is present, complains of contractions, complains of loss of fluid, denies vaginal bleeding.        Vital Signs:  Vitals:    21 0001 21 0006 21 0011 21 0016   BP: (!) 159/79 (!) 155/106 (!) 172/92 (!) 173/88   Pulse: 105 102 98 98   Resp: 18 18 18 18   Temp:       TempSrc:       SpO2:       Weight:       Height:             FHT: 130, moderate variability, accelerations present, decelerations early  Contractions: regular, every 2-3 minutes      Cervical Exam: patient declined  Pitocin: @ 0 mu/min    Membranes: Ruptured clear fluid  Scalp Electrode in place: absent  Intrauterine Pressure Catheter in Place: absent    Interventions: none    Assessment/Plan:  Asiya Montanez is a 19 y.o. female  at 44w2d admitted for RR IOL              - GBS negative, No indication for GBS prophylaxis              - cEFM/TOCO              - S/p salas balloon              - S/p Cytotec 50 mcg PO x1, Cytotec 25 PV x1              - Pitocin ordered              - SROM (clr fluid)              - SVE: /-2              - Continue expectant management     Preeclampsia w/ SF's (newly diagnosed)              - Severe range blood pressures requiring IV labetalol 20mg x1   - 4g magnesium bolus ordered followed by 2g/hr              - PreE labs wnl x1, P/C 0.36 (6/15)               - Denies s/s preeclampsia   - Will continue to monitor closely        Attending updated and in agreement with freda Lopez DO  Ob/Gyn Resident  2021, 12:24 AM

## 2021-06-16 NOTE — FLOWSHEET NOTE
Pt sitting up for epidural. Pt is crying and visibly upset. Pt had a couple severe BP's during this time. Dr María Mendoza was notified. Labetalol 20mg and Magnesium were both ordered.

## 2021-06-16 NOTE — FLOWSHEET NOTE
RN informs Dr. Dahlia Gomez, Dr. Janina Campos and Dr. Jacki Monterroso about pt low blood pressure and elevated pulse. Orders received for fluid bolus. Bolus initiated. Blood pressure cuff adjusted, blood pressure cycled.

## 2021-06-16 NOTE — PROGRESS NOTES
Labor Progress Note    Geeta Leone is a 19 y.o. female  at 44w2d  The patient was seen and examined. Her pain is somewhat well controlled. She reports fetal movement is present, complains of contractions, complains of loss of fluid, denies vaginal bleeding. The FHT was showing recurrent late decelerations. She was repositioned and IVF bolus was started. Of note she has been hypotensive ranging from 70/40-90/40. She denies SOB, chest pain, lightheadedness, or dizziness. She admits to a headache. Vital Signs:  Vitals:    21 0730 21 0731 21 0735 21 0741   BP:  (!) 97/47  (!) 100/48   Pulse:  116  116   Resp:       Temp:       TempSrc:       SpO2: 98%  99%    Weight:       Height:         FHT: 140, moderate variability, accelerations present, decelerations absent  Contractions: regular, every 2-3 minutes    Chaperone for Intimate Exam: Chaperone was present for entire exam, Chaperone Name: Gail Soto RN  Cervical Exam: 6 cm dilated, 80 effaced, -1 station  Pitocin: @ 3 mu/min    Membranes: Ruptured clear fluid  Scalp Electrode in place: absent  Intrauterine Pressure Catheter in Place: present    Interventions: IUPC placed without difficulty. Assessment/Plan:  Geeta Leone is a 19 y.o. female  at 44w2d admitted for RR IOL   - GBS negative, No indication for GBS prophylaxis   - The patient is hypotensive and tachycardic.  IVF given   -  Epidural in place   - SROM (clr)    - Pit per protocol    - S/p salas    - S/p Cytotec 50 mcg PO x1, Cytotec 25 PV x1    - S/p Nubain x1    - Will continue to monitor closely    Attending updated and in agreement with plan    Kel Cates DO  Ob/Gyn Resident  2021, 8:01 AM

## 2021-06-16 NOTE — PROGRESS NOTES
Labor Progress Note    Cher Nobles is a [age] [facility] y.o. female  at 44w2d  The patient was seen and examined. Her pain is well controlled. She reports fetal movement is present, complains of contractions, complains of loss of fluid, denies vaginal bleeding.        Vital Signs:  Vitals:    21 0120 21 0135 21 0150 21 0200   BP: 124/74 129/89 118/64 (!) 159/144   Pulse: 91 89 94 107   Resp: 20 20 20 20   Temp:       TempSrc:       SpO2: 100% 100% 100%    Weight:       Height:             FHT: 135, moderate variability, accelerations present, intermittent late decelerations  Contractions: irregular, every 1-4 minutes    Chaperone for Intimate Exam: Chaperone was present for entire exam, Chaperone Name: Carry Leanne RN  Cervical Exam: 5 cm dilated, 80 effaced, -1 station  Pitocin: @ 1 mu/min    Membranes: Ruptured clear fluid  Scalp Electrode in place: absent  Intrauterine Pressure Catheter in Place: absent    Interventions: Intrauterine resuscitation with IVF bolus and position changes    Assessment/Plan:  Cher Nobles is a [age] [facility] y.o. female  at 44w2d admitted for RR IOL              - GBS negative, No indication for GBS prophylaxis              - cEFM/TOCO - Intermittent category 2 tracing, intrauterine resuscitation with IVF bolus and position changes              - S/p salas balloon              - S/p Cytotec 50 mcg PO x1, Cytotec 25 PV x1              - Pitocin per protocol              - SROM (clr fluid) @ 2240              - SVE: /-1              - Continue expectant management     Preeclampsia w/ SF's (newly diagnosed)              - Last severe range @ 0200              - 4g magnesium bolus, followed by 2g/hr   - S/p IV Labetalol 20 x1 ( @ 0024)               - PreE labs wnl x1, P/C 0.36 (6/15)               - Denies s/s preeclampsia   - Continue to monitor closely        Senior resident updated and in agreement with plan    Ainsley Adams,   Ob/Gyn Resident  2021, 2:16 AM

## 2021-06-16 NOTE — ANESTHESIA PROCEDURE NOTES
Epidural Block    Patient location during procedure: OB  Start time: 6/15/2021 11:53 PM  Reason for block: labor epidural  Staffing  Performed: resident/CRNA   Anesthesiologist: Mariela Cuellar MD  Resident/CRNA: ABBEY Marmolejo CRNA  Preanesthetic Checklist  Completed: patient identified, IV checked, site marked, risks and benefits discussed, surgical consent, monitors and equipment checked, pre-op evaluation, timeout performed, anesthesia consent given, oxygen available and patient being monitored  Epidural  Patient position: sitting  Prep: Betadine  Patient monitoring: continuous pulse ox and frequent blood pressure checks  Approach: midline  Location: lumbar (1-5)  Injection technique: ALANA air  Guidance: paresthesia technique  Provider prep: mask and sterile gloves  Needle  Needle type: Tuohy   Needle gauge: 17 G  Needle length: 3.5 in  Needle insertion depth: 6.5 cm  Catheter type: end hole  Catheter size: 19 G  Catheter at skin depth: 12 cm  Test dose: negative  Assessment  Sensory level: T6  Hemodynamics: stable  Attempts: 1

## 2021-06-17 LAB — FETAL SCREEN: NORMAL

## 2021-06-17 PROCEDURE — 36415 COLL VENOUS BLD VENIPUNCTURE: CPT

## 2021-06-17 PROCEDURE — 6370000000 HC RX 637 (ALT 250 FOR IP): Performed by: STUDENT IN AN ORGANIZED HEALTH CARE EDUCATION/TRAINING PROGRAM

## 2021-06-17 PROCEDURE — 6360000002 HC RX W HCPCS: Performed by: STUDENT IN AN ORGANIZED HEALTH CARE EDUCATION/TRAINING PROGRAM

## 2021-06-17 PROCEDURE — 85461 HEMOGLOBIN FETAL: CPT

## 2021-06-17 PROCEDURE — 96372 THER/PROPH/DIAG INJ SC/IM: CPT

## 2021-06-17 PROCEDURE — 1220000000 HC SEMI PRIVATE OB R&B

## 2021-06-17 RX ORDER — FERROUS SULFATE 325(65) MG
325 TABLET ORAL 2 TIMES DAILY
Qty: 60 TABLET | Refills: 0 | Status: SHIPPED | OUTPATIENT
Start: 2021-06-17 | End: 2021-06-17 | Stop reason: SDUPTHER

## 2021-06-17 RX ORDER — FUROSEMIDE 10 MG/ML
20 INJECTION INTRAMUSCULAR; INTRAVENOUS ONCE
Status: COMPLETED | OUTPATIENT
Start: 2021-06-17 | End: 2021-06-17

## 2021-06-17 RX ORDER — IBUPROFEN 800 MG/1
800 TABLET ORAL EVERY 8 HOURS PRN
Qty: 60 TABLET | Refills: 1 | Status: SHIPPED | OUTPATIENT
Start: 2021-06-17 | End: 2022-06-21

## 2021-06-17 RX ORDER — DOCUSATE SODIUM 100 MG/1
100 CAPSULE, LIQUID FILLED ORAL 2 TIMES DAILY PRN
Qty: 60 CAPSULE | Refills: 0 | Status: SHIPPED | OUTPATIENT
Start: 2021-06-17 | End: 2022-07-11 | Stop reason: ALTCHOICE

## 2021-06-17 RX ORDER — FERROUS SULFATE 325(65) MG
325 TABLET ORAL 2 TIMES DAILY
Qty: 60 TABLET | Refills: 0 | Status: SHIPPED | OUTPATIENT
Start: 2021-06-17 | End: 2021-07-13

## 2021-06-17 RX ADMIN — ACETAMINOPHEN 1000 MG: 500 TABLET ORAL at 23:24

## 2021-06-17 RX ADMIN — DOCUSATE SODIUM 100 MG: 100 CAPSULE ORAL at 08:17

## 2021-06-17 RX ADMIN — ACETAMINOPHEN 1000 MG: 500 TABLET ORAL at 08:17

## 2021-06-17 RX ADMIN — HUMAN RHO(D) IMMUNE GLOBULIN 300 MCG: 300 INJECTION, SOLUTION INTRAMUSCULAR at 17:41

## 2021-06-17 RX ADMIN — IBUPROFEN 600 MG: 600 TABLET, FILM COATED ORAL at 08:17

## 2021-06-17 RX ADMIN — IBUPROFEN 600 MG: 600 TABLET, FILM COATED ORAL at 16:10

## 2021-06-17 RX ADMIN — IBUPROFEN 600 MG: 600 TABLET, FILM COATED ORAL at 23:24

## 2021-06-17 RX ADMIN — DOCUSATE SODIUM 100 MG: 100 CAPSULE ORAL at 21:34

## 2021-06-17 RX ADMIN — FERROUS SULFATE TAB EC 325 MG (65 MG FE EQUIVALENT) 325 MG: 325 (65 FE) TABLET DELAYED RESPONSE at 08:17

## 2021-06-17 RX ADMIN — FUROSEMIDE 20 MG: 10 INJECTION, SOLUTION INTRAMUSCULAR; INTRAVENOUS at 21:34

## 2021-06-17 ASSESSMENT — PAIN SCALES - GENERAL
PAINLEVEL_OUTOF10: 4
PAINLEVEL_OUTOF10: 3
PAINLEVEL_OUTOF10: 2

## 2021-06-17 ASSESSMENT — PAIN DESCRIPTION - LOCATION: LOCATION: BACK

## 2021-06-17 ASSESSMENT — PAIN DESCRIPTION - DESCRIPTORS: DESCRIPTORS: SORE

## 2021-06-17 ASSESSMENT — PAIN DESCRIPTION - PAIN TYPE: TYPE: ACUTE PAIN

## 2021-06-17 NOTE — PROGRESS NOTES
with the resident. I agree with the assessment, plan and orders as documented by the resident. BPs normotensive since delivery. Magnesium off 24 hours s/p delivery. Continue to monitor BPs until tomorrow.  (GE Modifier)    Electronically signed by Alexandr Mcdaniels MD at 2:28 PM .today

## 2021-06-17 NOTE — PROGRESS NOTES
Resident Interval Post-partum Magnesium Sulfate Note    Susan Deitz is a 19 y.o. female [id number] PPD# 1 s/p  on 21, PreE w/ SF's  The patient is resting comfortably. She denies headache, visual changes and abdominal pain in the right upper quadrant. She denies any shortness of breath or chest pain. She denies change in her extremities, regarding swelling. Her lochia is light. She is  breast feeding and she denies any breast tenderness. She is ambulating well. Her voiding pattern is normal. I reviewed signs and symptoms of post partum depression with the patient, she currently denies any of these symptoms. She is tolerating solids. Patient denies fever, chills, nausea, vomiting, or calf pain. Continuous Medications:    oxytocin 87.3 kami-units/min (21 1351)    magnesium sulfate 2,000 mg/hr (21 2253)    lactated ringers 75 mL/hr at 21 1704       Vitals:    Vitals:    21 1911 21 2000 21 2100 21 2200   BP: 100/65 98/62 112/70 100/63   Pulse: 100 97 86 82   Resp: 18 16 16 18   Temp:  97.7 °F (36.5 °C)     TempSrc:       SpO2: 97% 98% 100% 99%   Weight:       Height:             Physical Exam:  Chest: clear to auscultation bilaterally  Heart: RRR no murmur  Abdomen: soft, nontender, nondistended  Extremities: DTR normal Right: 2/4   Left: 2/4  Clonus: absent    Urine Output: 150mL/hr; Clear urine    Labs:  Last Magnesium Level:   No results found for: MG    BMP:    Recent Labs     06/15/21  0808   *   K 3.7      CO2 21   BUN 7   CREATININE 0.35*   GLUCOSE 90       ASSESSMENT/PLAN  1. Susan Dietz is a  PPD # 1 s/p    - Doing well, VSS   - female infant in [facility]   - Encourage ambulation   - Motrin/Tylenol PRN pain control   - Continue postpartum care   2. Rh negative/Rubella immune  - Rhogam indicated and ordered  - MMR not indicated   3. Breast feeding   - Denies s/sx mastitis  4.  PreE w/ SF's   - Pre E labs wnl x 1, P/C 0.36 (6/15)   - S/p IV Labetalol 20mg x1, last @ 0024 on 6/16  - Continue Magnesium Sulfate Treatment 2g/hr, off @ 1400 on 6/17/21   - No Mag levels per provider   - BPs normotensive   - Patient denies any s/s PreE   - UOP adequate   - Continue to monitor closely  5. Anemia   - Hgb 9.9 on admission  - VSS, clinically asymptomatic   - Iron ordered, will give Rx for discharge   6. Leukocytosis  - WBC 16.6 on admission  - Afebrile, VSS  7. BMI 42.9      Counseling Completed:  Secondary Smoke risks and Sudden Infant Death Syndrome were reviewed with recommendations. Infant sleeping, \"back to sleep\" and avoidance of co-sleeping recommendations were reviewed. Signs and Symptoms of Post Partum Depression were reviewed. The patient is to call if any occur. Signs and symptoms of Mastitis were reviewed. The patient is to call if any occur for follow up.   Discharge instructions including pelvic rest, no driving with pain medicine and office follow-up were reviewed with patient     Provider's Name: Dr. Katelin Michelle DO  Ob/Gyn Resident   6/17/2021, 12:09 AM

## 2021-06-17 NOTE — LACTATION NOTE
Actively nursing, mom able to independently latch in cradle. Reviewed  feeding expectations, education booklet at bedside. Has personal pump with her and would like assistance with it before discharge.

## 2021-06-17 NOTE — CARE COORDINATION
POST-PARTUM  TRANSITIONAL CARE PLAN    39 weeks gestation of pregnancy [Z3A.39]    Writer met w/ Edmundo Coombs at bedside to discuss DCP. GALINA is S/P  on 2021 @ 56 at 39w2d of Female infant    Infant name on BC: Taiwo 41. Infant to [facility]. Infant PCP Dr. Zofia Avendano office. FOB: Dion Angry [phone]    Writer verified name/address/phone number correct on facesheet    BCBS Primary and 2ndry (under her dad) insurance correct. Writer notified Edmundo Due they have 30 days from date of birth to add Adaline to insurance policy. They verbalized understanding. No previous home care or dme. Anticipate DC of couplet 2021    CM continue to follow for any DC needs.

## 2021-06-17 NOTE — ANESTHESIA POSTPROCEDURE EVALUATION
Department of Anesthesiology  Postprocedure Note    Patient: Vilma Buckner  MRN: [id number]  YOB: 2000  Date of evaluation: 6/17/2021  Time:  7:04 AM     Procedure Summary     Date: 06/15/21 Room / Location:     Anesthesia Start: 2322 Anesthesia Stop: 06/16/21 1333    Procedure: Labor Analgesia Diagnosis:     Scheduled Providers:  Responsible Provider: Nahomy Mccoy MD    Anesthesia Type: epidural ASA Status: 2          Anesthesia Type: epidural    Monica Phase I: Monica Score: 10    Monica Phase II:      Last vitals: Reviewed and per EMR flowsheets.        Anesthesia Post Evaluation    Patient location during evaluation: floor  Patient participation: complete - patient participated  Level of consciousness: awake  Pain score: 0  Airway patency: patent  Nausea & Vomiting: no vomiting and no nausea  Complications: no  Cardiovascular status: blood pressure returned to baseline  Respiratory status: acceptable  Hydration status: stable

## 2021-06-18 VITALS
DIASTOLIC BLOOD PRESSURE: 73 MMHG | WEIGHT: 274 LBS | OXYGEN SATURATION: 96 % | BODY MASS INDEX: 43 KG/M2 | SYSTOLIC BLOOD PRESSURE: 104 MMHG | HEART RATE: 100 BPM | RESPIRATION RATE: 17 BRPM | HEIGHT: 67 IN | TEMPERATURE: 99.1 F

## 2021-06-18 LAB
BLD PROD TYP BPU: NORMAL
DISPENSE STATUS BLOOD BANK: NORMAL
SURGICAL PATHOLOGY REPORT: NORMAL
TRANSFUSION STATUS: NORMAL
UNIT DIVISION: 0
UNIT NUMBER: NORMAL

## 2021-06-18 PROCEDURE — 6370000000 HC RX 637 (ALT 250 FOR IP): Performed by: STUDENT IN AN ORGANIZED HEALTH CARE EDUCATION/TRAINING PROGRAM

## 2021-06-18 RX ADMIN — DOCUSATE SODIUM 100 MG: 100 CAPSULE ORAL at 08:57

## 2021-06-18 RX ADMIN — ACETAMINOPHEN 1000 MG: 500 TABLET ORAL at 12:24

## 2021-06-18 RX ADMIN — IBUPROFEN 600 MG: 600 TABLET, FILM COATED ORAL at 08:57

## 2021-06-18 RX ADMIN — FERROUS SULFATE TAB EC 325 MG (65 MG FE EQUIVALENT) 325 MG: 325 (65 FE) TABLET DELAYED RESPONSE at 08:57

## 2021-06-18 ASSESSMENT — PAIN SCALES - GENERAL
PAINLEVEL_OUTOF10: 4
PAINLEVEL_OUTOF10: 2

## 2021-06-18 NOTE — PLAN OF CARE
Problem: Pain:  Description: Pain management should include both nonpharmacologic and pharmacologic interventions.   Goal: Pain level will decrease  Description: Pain level will decrease  Outcome: Ongoing  Goal: Control of acute pain  Description: Control of acute pain  Outcome: Ongoing  Goal: Control of chronic pain  Description: Control of chronic pain  Outcome: Ongoing     Problem: Anxiety:  Goal: Level of anxiety will decrease  Description: Level of anxiety will decrease  Outcome: Ongoing     Problem: Breathing Pattern - Ineffective:  Goal: Able to breathe comfortably  Description: Able to breathe comfortably  Outcome: Ongoing     Problem: Fluid Volume - Imbalance:  Goal: Absence of imbalanced fluid volume signs and symptoms  Description: Absence of imbalanced fluid volume signs and symptoms  Outcome: Ongoing  Goal: Absence of intrapartum hemorrhage signs and symptoms  Description: Absence of intrapartum hemorrhage signs and symptoms  Outcome: Ongoing  Goal: Absence of postpartum hemorrhage signs and symptoms  Description: Absence of postpartum hemorrhage signs and symptoms  Outcome: Ongoing     Problem: Infection - Intrapartum Infection:  Goal: Will show no infection signs and symptoms  Description: Will show no infection signs and symptoms  Outcome: Ongoing     Problem: Labor Process - Prolonged:  Goal: Labor progression, first stage, within specified pattern  Description: Labor progression, first stage, within specified pattern  Outcome: Ongoing  Goal: Labor progession, second stage, within specified pattern  Description: Labor progession, second stage, within specified pattern  Outcome: Ongoing  Goal: Uterine contractions within specified parameters  Description: Uterine contractions within specified parameters  Outcome: Ongoing     Problem:  Screening:  Goal: Ability to make informed decisions regarding treatment has improved  Description: Ability to make informed decisions regarding treatment has
continuous maternal oxygen supplementation to prevent nonreassu . ..   Goal: Absence of abnormal fetal heart rate pattern  Description: Absence of abnormal fetal heart rate pattern  6/16/2021 2149 by Sherri Edmonds RN  Outcome: Ongoing  6/16/2021 1624 by Nola López RN  Outcome: Ongoing     Problem: Urinary Retention:  Goal: Experiences of bladder distention will decrease  Description: Experiences of bladder distention will decrease  6/16/2021 2149 by Sherri Edmonds RN  Outcome: Ongoing  6/16/2021 1624 by Nola López RN  Outcome: Ongoing  Goal: Urinary elimination within specified parameters  Description: Urinary elimination within specified parameters  6/16/2021 2149 by Sherri Edmonds RN  Outcome: Ongoing  6/16/2021 1624 by Nola López RN  Outcome: Ongoing     Problem: Discharge Planning:  Goal: Discharged to appropriate level of care  Description: Discharged to appropriate level of care  6/16/2021 2149 by Sherri Edmonsd RN  Outcome: Ongoing  6/16/2021 1624 by Nola López RN  Outcome: Ongoing     Problem: Constipation:  Goal: Bowel elimination is within specified parameters  Description: Bowel elimination is within specified parameters  6/16/2021 2149 by Sherri Edmonds RN  Outcome: Ongoing  6/16/2021 1624 by Nola López RN  Outcome: Ongoing     Problem: Infection - Risk of, Puerperal Infection:  Goal: Will show no infection signs and symptoms  Description: Will show no infection signs and symptoms  6/16/2021 2149 by Sherri Edmonds RN  Outcome: Ongoing  6/16/2021 1624 by Nola López RN  Outcome: Ongoing     Problem: Mood - Altered:  Goal: Mood stable  Description: Mood stable  6/16/2021 2149 by Sherri Edmonds RN  Outcome: Ongoing  6/16/2021 1624 by Nola López RN  Outcome: Ongoing

## 2021-06-18 NOTE — FLOWSHEET NOTE
Discussed Post Birth Warning Signs, Postpartum Discharge Instructions and medication(s) with the patient and all questions fully answered. Educated on when to follow up with OBGYN in 2 weeks. No concerns noted.

## 2021-06-18 NOTE — PROGRESS NOTES
POST PARTUM DAY # 2    Bhavna Clayton is a 19 y.o. female  This patient was seen & examined today. Her pregnancy was complicated by:   Patient Active Problem List   Diagnosis    Fam H/O DM (pt's mom)    Maternal obesity (BMI 40)    First pregnancy    H/O headaches    Influenza vaccine declined    Rh negative state    Fetal adipex exposure    History of heavy periods    SMA carrier status    Need for Tdap vaccination    Elevated BP without diagnosis of hypertension    Tachycardia    39 weeks gestation of pregnancy    Pre-eclampsia w/o SF      19 F Apg 8/9 Wt 7#8        Today she is doing well without any chief complaint, she states her lower extremity swelling has improved with Lasix. Her lochia is light. She denies chest pain, shortness of breath, headache, lightheadedness and blurred vision. She is  breast feeding and she denies any breast tenderness. She is ambulating well. Her voiding pattern is normal. I reviewed signs and symptoms of post partum depression with the patient, she currently denies any of these symptoms. She is tolerating solids.      Vital Signs:  Vitals:    21 1300 21 1610 21 2000 21 0000   BP: 108/69 118/83 124/69 124/79   Pulse: 97 102 98 90   Resp:    Temp:  98.4 °F (36.9 °C) 97.6 °F (36.4 °C) 97.8 °F (36.6 °C)   TempSrc:       SpO2: 96%      Weight:       Height:             Physical Exam:  General:  no apparent distress, alert and cooperative  Neurologic:  alert, oriented, normal speech, no focal findings or movement disorder noted  Lungs:  No increased work of breathing, good air exchange, clear to auscultation bilaterally, no crackles or wheezing  Heart:  regular rate and rhythm    Abdomen: abdomen soft, non-distended, non-tender  Fundus: non-tender, firm, below umbilicus  Extremities:  no calf tenderness, non edematous    Lab:  Lab Results   Component Value Date    HGB 9.9 (L) 06/15/2021     Lab Results   Component Value Date    HCT 32.4 (L) 06/15/2021       Assessment/Plan:  1. Judieth Schwab is a  PPD # 2 s/p               - Doing well, VSS              - female infant in [place] [address] Nursery              - Encourage ambulation              - Motrin/Tylenol PRN pain control              - Continue postpartum care   2. Rh negative/Rubella immune  - Rhogam given  - MMR not indicated   3. Breast feeding   - Denies s/sx mastitis  4. PreE w/ SF's              - Pre E labs wnl x 1, P/C 0.36 (6/15)              - S/p IV Labetalol 20mg x1, last @ 0024 on   - S/p Mag x24h              - BPs normotensive              - Patient denies any s/s PreE              - Continue to monitor closely  5. Anemia   - Hgb 9.9 on admission  - VSS, clinically asymptomatic   - Iron ordered, will give Rx for discharge   6. Leukocytosis  - WBC 16.6 on admission  - Afebrile, VSS   7. BMI 42.9    Counseling Completed:  Secondary Smoke risks and Sudden Infant Death Syndrome were reviewed with recommendations. Infant sleeping, \"back to sleep\" and avoidance of co-sleeping recommendations were reviewed. Signs and Symptoms of Post Partum Depression were reviewed. The patient is to call if any occur. Signs and symptoms of Mastitis were reviewed. The patient is to call if any occur for follow up. Discharge instructions including pelvic rest, no driving with pain medicine and office follow-up were reviewed with patient     Attending Physician: Dr. Sherial Curling,   Ob/Gyn Resident   2021, 12:51 AM         Attending Physician Statement  I have discussed the care of Judieth Schwab, including pertinent history and exam findings,  with the resident. I have reviewed the key elements of all parts of the encounter with the resident. I agree with the assessment, plan and orders as documented by the resident. BPs well controlled since delivery. Plan for discharge home today.  S/p 1 dose of Lasix last night at pt request due to LE edema, which has only improved slightly.  (GE Modifier)    Electronically signed by Carmita Greer MD at 8:16 AM 06/18/21

## 2021-06-21 ENCOUNTER — NURSE TRIAGE (OUTPATIENT)
Dept: OTHER | Facility: CLINIC | Age: 21
End: 2021-06-21

## 2021-06-21 ENCOUNTER — TELEPHONE (OUTPATIENT)
Dept: OBGYN CLINIC | Age: 21
End: 2021-06-21

## 2021-06-21 NOTE — TELEPHONE ENCOUNTER
Pt called she deliver Wednesday vaginally she is having some swelling on and off in her leg she said it can be painful she was wondering if she can get a water pill or what you suggest she do

## 2021-06-21 NOTE — TELEPHONE ENCOUNTER
If she has one calf that is more swollen than the other with associated warmth/redness/pain, then she may need to be evaluated for a blood clot. Otherwise, please let the patient know that this is common after delivery as her fluid and hormones regulate. We do not routinely prescribe water pills. Encourage water intake and elevating her LE to the level of her heart when able. Thanks!

## 2021-06-21 NOTE — TELEPHONE ENCOUNTER
Received call from Nnamdi  at [facility]  with [employer]. Brief description of triage: post partum one week and is having leg swelling with painful swelling from toes to hips bilaterally. Triage indicates for patient to  Speak to the office staff of [facility] as this writer is unsure if this is a PCP issue or OBGYN issue. The patient has not spoken with her OBGYN. This writer did speak to Irlanda Aquino at the PCP office who did take a soft transfer of the patient's call in order for an evaluation of this being a PCP visit complaint or OBGYN complaint. Attention Provider: Thank you for allowing me to participate in the care of your patient. The patient was connected to triage in response to information provided to the [facility]. Please do not respond through this encounter as the response is not directed to a shared pool. Reason for Disposition   Requesting regular office appointment    Answer Assessment - Initial Assessment Questions  1. REASON FOR CALL or QUESTION: \"What is your reason for calling today? \" or \"How can I best help you? \" or \"What question do you have that I can help answer? \"      Post partum x1 week and is having bilateral leg swelling from toes to hips.     Protocols used: INFORMATION ONLY CALL - NO TRIAGE-ADULT-AH  see above documentation

## 2021-06-23 ENCOUNTER — TELEPHONE (OUTPATIENT)
Dept: OBGYN CLINIC | Age: 21
End: 2021-06-23

## 2021-07-02 ENCOUNTER — POSTPARTUM VISIT (OUTPATIENT)
Dept: OBGYN CLINIC | Age: 21
End: 2021-07-02

## 2021-07-02 VITALS
HEART RATE: 84 BPM | HEIGHT: 67 IN | DIASTOLIC BLOOD PRESSURE: 81 MMHG | SYSTOLIC BLOOD PRESSURE: 120 MMHG | BODY MASS INDEX: 39.08 KG/M2 | WEIGHT: 249 LBS

## 2021-07-02 DIAGNOSIS — Z67.91 RH NEGATIVE STATE IN ANTEPARTUM PERIOD: ICD-10-CM

## 2021-07-02 DIAGNOSIS — O26.899 RH NEGATIVE STATE IN ANTEPARTUM PERIOD: ICD-10-CM

## 2021-07-02 DIAGNOSIS — O14.93 PRE-ECLAMPSIA IN THIRD TRIMESTER: ICD-10-CM

## 2021-07-02 DIAGNOSIS — Z87.42 HISTORY OF HEAVY PERIODS: ICD-10-CM

## 2021-07-02 DIAGNOSIS — Z14.8 GENETIC CARRIER STATUS: ICD-10-CM

## 2021-07-02 PROCEDURE — 99024 POSTOP FOLLOW-UP VISIT: CPT | Performed by: OBSTETRICS & GYNECOLOGY

## 2021-07-02 NOTE — PROGRESS NOTES
Postpartum Visit    Didier Francois is a 19 y.o. female , 2 weeks Post Partum s/p  on 21    The patient was seen. Her pregnancy was complicated by preE with SF (s/p mag sulfate x24h), maternal obesity, Rh negative status, SMA carrier status (FOB neg), fetal adipex exposure, and family h/o DM. She has no chief complaint today. She denies headache, vision changes, and RUQ pain. She denies any fevers/chills, SOB, cough, sore throat, myalgias, n/v, loss of taste/smell or sick contacts. She is  breast feeding and denies signs or symptoms of mastitis. The patient completed the E.P.D.S. Post Partum Depression Evaluation form and scored 1. She does not have signs or symptoms of post partum depression. She denies any suicidal thoughts with a plan, intent to harm others and delusional ideas. She does admit to having good home support. Today her lochia is light. She denies any dizziness or shortness of breath. Her bowels are regular and she denies any urinary tract symptomology. She has h/o heavy periods and doesn't know if she wants to use anything in the future. OB History    Para Term  AB Living   1 1 1 0 0 1   SAB TAB Ectopic Molar Multiple Live Births   0 0 0 0 0 1     Patient Active Problem List   Diagnosis    Fam H/O DM (pt's mom)    Maternal obesity (BMI 40)    First pregnancy    H/O headaches    Influenza vaccine declined    Rh negative state    Fetal adipex exposure    History of heavy periods    SMA carrier status    Need for Tdap vaccination    Tachycardia    39 weeks gestation of pregnancy    Pre-eclampsia w/ SF      19 F Apg 8/9 Wt 7#8        Vitals:   Blood pressure 120/81, pulse 84, height 5' 7\" (1.702 m), weight 249 lb (112.9 kg), last menstrual period 2020, currently breastfeeding.     Physical Exam:  General:  no apparent distress, alert and cooperative  Lungs:  No increased work of breathing, good air exchange, clear to auscultation syndrome, and potential malignancies discussed  · Abstinence, family planning counseling and STD counseling discussed  · Continue with postpartum restrictions until 6 weeks post partum      Leonid Gonsales DO   [id number] [facility]  7/2/2021 9:35 AM

## 2021-07-13 RX ORDER — FERROUS SULFATE 325(65) MG
TABLET ORAL
Qty: 60 TABLET | Refills: 0 | Status: SHIPPED | OUTPATIENT
Start: 2021-07-13 | End: 2022-07-11 | Stop reason: ALTCHOICE

## 2021-07-28 NOTE — PROGRESS NOTES
Postpartum Visit    Chase Elaine is a 19 y.o. female , 6 weeks Post Partum s/p  on 21    The patient was seen. Her pregnancy was complicated by preE with SF (s/p mag sulfate x24h), maternal obesity, Rh negative status, SMA carrier status (FOB neg), fetal adipex exposure, and family h/o DM. She has no chief complaint today. She denies headache, vision changes, and RUQ pain. She denies any fevers/chills, SOB, cough, sore throat, myalgias, n/v, loss of taste/smell or sick contacts. She is  breast feeding and denies signs or symptoms of mastitis. She does not have signs or symptoms of post partum depression. She denies any suicidal thoughts with a plan, intent to harm others and delusional ideas. She does admit to having good home support. Today her lochia is resolved. She denies any dizziness or shortness of breath. Her bowels are regular and she denies any urinary tract symptomology. She would like to use NSAIDs for her h/o heavy periods. OB History    Para Term  AB Living   1 1 1 0 0 1   SAB TAB Ectopic Molar Multiple Live Births   0 0 0 0 0 1     Patient Active Problem List   Diagnosis    Fam H/O DM (pt's mom)    Maternal obesity (BMI 40)    First pregnancy    H/O headaches    Influenza vaccine declined    Rh negative state    Fetal adipex exposure    History of heavy periods    SMA carrier status    Need for Tdap vaccination    Tachycardia    39 weeks gestation of pregnancy    Pre-eclampsia w/ SF      19 F Apg 8/9 Wt 7#8        Vitals:   Blood pressure 113/72, pulse 84, temperature 97.4 °F (36.3 °C), weight 249 lb (112.9 kg), last menstrual period 2020, currently breastfeeding.     Physical Exam:  General:  no apparent distress, alert and cooperative  Lungs:  No increased work of breathing, good air exchange, clear to auscultation bilaterally, no crackles or wheezing  Heart:  regular rate and rhythm and no murmur    Abdomen: Abdomen soft, non-tender. BS normal. No masses,  No organomegaly  Fundus: non-tender, normal size, firm, below umbilicus  Perineum: No external lesions or erythema, no cervical motion tenderness, no bladder tenderness, no adnexal tenderness or masses appreciated, uterus 6-8cm/mobile/nontender  Extremities:  no calf tenderness, non edematous    Assessment:  Queta Givens is a 19 y.o. female , 6 weeks Post Partum s/p  on 21   - Stable, postpartum restrictions lifted   - VSS, afebrile. No s/s preE.   - Reviewed medical management options for heavy periods, including side effect profiles and dosing regimens. All questions answered. Patient desires to use NSAIDs.    - Family planning counseling and STD counseling discussed. Patient states she will use condoms. Patient Active Problem List    Diagnosis Date Noted     19 F Apg 8/9 Wt 7#8      39 weeks gestation of pregnancy 06/15/2021    Pre-eclampsia w/ SF  06/15/2021     S/p mag sulfate      Tachycardia 2021     And possible presyncopal episode, suspect POTS  Baseline EKG wnl 21      Need for Tdap vaccination 2021     Given 21      SMA carrier status 2021     SMN1+ carrier  Negative FOB testing      Fetal adipex exposure 2021     For 1-2 weeks in October      History of heavy periods 2021     OCPs in the past caused worsening acne and weight gain      Rh negative state 2021     Rhogam given 3/31/21        Fam H/O DM (pt's mom) 2020     Early 1h       Maternal obesity (BMI 37) 2020     Early 1h   1 hr       First pregnancy 2020     Declined genetic screening  Will need ASA 81mg daily for PreE prophylaxis after 12 wks      H/O headaches 2020     Usually treats with advil outside of pregnancy      Influenza vaccine declined 2020       Return in about 1 year (around 2022) for annual or earlier prn.     Counseling Completed:  · Signs & Symptoms of mastitis and when to notify office discussed.   · Secondary smoke risks including increased risks of respiratory problems, Sudden infant death syndrome, and potential malignancies discussed      DO [facility]  7/30/2021 9:13 AM

## 2021-07-30 ENCOUNTER — POSTPARTUM VISIT (OUTPATIENT)
Dept: OBGYN CLINIC | Age: 21
End: 2021-07-30

## 2021-07-30 VITALS
DIASTOLIC BLOOD PRESSURE: 72 MMHG | TEMPERATURE: 97.4 F | BODY MASS INDEX: 39 KG/M2 | SYSTOLIC BLOOD PRESSURE: 113 MMHG | WEIGHT: 249 LBS | HEART RATE: 84 BPM

## 2021-07-30 DIAGNOSIS — O26.899 RH NEGATIVE STATE IN ANTEPARTUM PERIOD: ICD-10-CM

## 2021-07-30 DIAGNOSIS — Z67.91 RH NEGATIVE STATE IN ANTEPARTUM PERIOD: ICD-10-CM

## 2021-07-30 DIAGNOSIS — O14.93 PRE-ECLAMPSIA IN THIRD TRIMESTER: ICD-10-CM

## 2021-07-30 DIAGNOSIS — Z14.8 GENETIC CARRIER STATUS: ICD-10-CM

## 2021-07-30 PROCEDURE — 0503F POSTPARTUM CARE VISIT: CPT | Performed by: OBSTETRICS & GYNECOLOGY

## 2022-06-21 ENCOUNTER — HOSPITAL ENCOUNTER (OUTPATIENT)
Age: 22
Setting detail: SPECIMEN
Discharge: HOME | End: 2022-06-21

## 2022-06-21 ENCOUNTER — OFFICE VISIT (OUTPATIENT)
Dept: OBGYN CLINIC | Age: 22
End: 2022-06-21
Payer: COMMERCIAL

## 2022-06-21 VITALS
SYSTOLIC BLOOD PRESSURE: 124 MMHG | HEIGHT: 67 IN | WEIGHT: 259 LBS | BODY MASS INDEX: 40.65 KG/M2 | DIASTOLIC BLOOD PRESSURE: 82 MMHG

## 2022-06-21 DIAGNOSIS — O21.9 NAUSEA AND VOMITING DURING PREGNANCY: ICD-10-CM

## 2022-06-21 DIAGNOSIS — Z32.01 POSITIVE PREGNANCY TEST: Primary | ICD-10-CM

## 2022-06-21 DIAGNOSIS — N92.6 MISSED MENSES: ICD-10-CM

## 2022-06-21 DIAGNOSIS — O09.299 HISTORY OF PRE-ECLAMPSIA IN PRIOR PREGNANCY, CURRENTLY PREGNANT: ICD-10-CM

## 2022-06-21 DIAGNOSIS — Z67.91 RH NEGATIVE STATE IN ANTEPARTUM PERIOD: ICD-10-CM

## 2022-06-21 DIAGNOSIS — Z32.01 POSITIVE PREGNANCY TEST: ICD-10-CM

## 2022-06-21 DIAGNOSIS — O26.899 RH NEGATIVE STATE IN ANTEPARTUM PERIOD: ICD-10-CM

## 2022-06-21 DIAGNOSIS — Z83.3 FAMILY HISTORY OF DIABETES MELLITUS IN MOTHER: ICD-10-CM

## 2022-06-21 LAB
AMPHETAMINE SCREEN URINE: NEGATIVE
BARBITURATE SCREEN URINE: NEGATIVE
BENZODIAZEPINE SCREEN, URINE: NEGATIVE
CANNABINOID SCREEN URINE: NEGATIVE
COCAINE METABOLITE, URINE: NEGATIVE
CONTROL: PRESENT
METHADONE SCREEN, URINE: NEGATIVE
OPIATES, URINE: NEGATIVE
OXYCODONE SCREEN URINE: NEGATIVE
PHENCYCLIDINE, URINE: NEGATIVE
PREGNANCY TEST URINE, POC: POSITIVE
TEST INFORMATION: NORMAL

## 2022-06-21 PROCEDURE — 81025 URINE PREGNANCY TEST: CPT

## 2022-06-21 PROCEDURE — 0502F SUBSEQUENT PRENATAL CARE: CPT

## 2022-06-21 ASSESSMENT — ENCOUNTER SYMPTOMS
NAUSEA: 1
VOMITING: 0

## 2022-06-21 NOTE — PROGRESS NOTES
[address] [facility]  [address] [place] [address]  Dept: [phone]      CC: Confirmation of pregnancy   Chief Complaint   Patient presents with    Amenorrhea     LMP: 4/15/22 = BREANNA: 23 (9w4d)      Subjective:   Kate Harden is being seen today for confirmation of pregnancy. She is a [id number] 9w4d estimated gestation based on LMP: Patient's last menstrual period was 04/15/2022. Estimated Date of Delivery: 23    This is a planned pregnancy. Pregnancy history fully reviewed. Her obstetrical history is significant for pre-eclampsia. Patient has had one previous pregnancy - carried to 39 weeks 2 days, delivered vaginally on  2021. Dr. Meir Carson delivered her firstborn daughter Jaimie Huston     denies History of  delivery or short cervix   denies History of diabetes, glucose intolerance, gastric bypass surgery or gestational diabetes   reports History of CHTN, preeclampsia or gestational hypertension  denies History of shoulder dystocia, postpartum hemorrhage, IUGR, macrosomia, severe anemia, hyperemesis or 4th degree lacerations   denies History of genital HSV  denies History of uterine surgeries or LEEP:   denies History of PPD:    Current daily medications - none    patient's ethnicity:   Father of baby's ethnicity:      Relationship with FOB: spouse, living together. FOB name: Chelsy Renner. New partner: no  T was noted in her first pregnancy, she is a SMN1+ carrier.  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, Coaches high school volleyball  Support system includes: family and friends  Resources:   Currently not using/needs assistance with: food stamps, rent assistance, transportaiton, mental health treatment, [address]  Safety concerns: Denies DV, unsafe housing  History of substance abuse: denies    Infant feeding plan: breast  -BF went well with adaline    Objective:     Vitals: 22 1516   BP: 124/82   Position: Sitting   Cuff Size: Large Adult   Weight: 259 lb (117.5 kg)   Height: 5' 7\" (1.702 m)      OB History        2    Para   1    Term   1            AB        Living   1       SAB        IAB        Ectopic        Molar        Multiple   0    Live Births   1                Past Surgical History:   Procedure Laterality Date    WISDOM TOOTH EXTRACTION       Social History     Tobacco Use   Smoking Status Never Smoker   Smokeless Tobacco Never Used     Social History     Substance and Sexual Activity   Alcohol Use Not Currently     Current Outpatient Medications   Medication Sig Dispense Refill    Prenatal MV & Min w/FA-DHA (PRENATAL ADULT GUMMY/DHA/FA) 0.4-25 MG CHEW Take 1 each by mouth daily 1 tablet 11    FEROSUL 325 (65 Fe) MG tablet take 1 tablet by mouth twice a day 60 tablet 0    docusate sodium (COLACE) 100 MG capsule Take 1 capsule by mouth 2 times daily as needed for Constipation (Patient not taking: Reported on 2021) 60 capsule 0    Prenatal Vit-Fe Fumarate-FA (PRENATAL PO) Take by mouth (Patient not taking: Reported on 2021)      vitamin B-6 (B-6) 25 MG tablet Take 1 tablet by mouth 2 times daily (Patient not taking: Reported on 2021) 60 tablet 1    doxyLAMINE succinate (GNP SLEEP AID) 25 MG tablet Take 1 tablet by mouth nightly (Patient not taking: Reported on 2021) 30 tablet 0    EPINEPHrine (EPIPEN) 0.3 MG/0.3ML SOAJ injection Inject 0.3 mLs into the muscle once for 1 dose Use as directed for allergic reaction 1 each 0     No current facility-administered medications for this visit. Current BMI: Greater than 40 - Morbid Obesity / Extreme Obesity / Grade III - Reviewed recommendations for weight gain in pregnancy. ALLERGIES:  Bee venom    Review of Systems   Constitutional: Negative for chills, fatigue and fever. Gastrointestinal: Positive for nausea. Negative for vomiting.    Genitourinary: Negative for decreased urine volume, difficulty urinating, dyspareunia, dysuria, frequency, genital sores, hematuria, menstrual problem, pelvic pain, urgency, vaginal bleeding, vaginal discharge and vaginal pain. All other systems reviewed and are negative. Physical Exam  Constitutional:       General: She is not in acute distress. Appearance: Normal appearance. She is well-developed and well-groomed. She is not ill-appearing, toxic-appearing or diaphoretic. Genitourinary:      Vulva normal.      Right Labia: No tenderness or lesions. Left Labia: No tenderness or lesions. Vaginal discharge (yellow, thick) present. Right Adnexa: not tender and no mass present. Left Adnexa: not tender and no mass present. HENT:      Head: Normocephalic and atraumatic. Pulmonary:      Effort: Pulmonary effort is normal.   Abdominal:      Tenderness: There is no abdominal tenderness. Musculoskeletal:         General: Normal range of motion. Cervical back: Normal range of motion. Neurological:      General: No focal deficit present. Mental Status: She is alert and oriented to person, place, and time. Mental status is at baseline. Skin:     General: Skin is warm and dry. Psychiatric:         Attention and Perception: Attention and perception normal.         Mood and Affect: Mood and affect normal.         Speech: Speech normal.         Behavior: Behavior normal. Behavior is cooperative. Thought Content: Thought content normal.         Cognition and Memory: Cognition normal.         Judgment: Judgment normal.   Vitals reviewed. Assessment & Plan:   [facility] was seen today for amenorrhea. Diagnoses and all orders for this visit:    Positive pregnancy test  -     C.trachomatis N.gonorrhoeae DNA; Future  -     CBC with Auto Differential; Future  -     Culture, Urine; Future  -     Hepatitis B Surface Antigen Obstetric Panel; Future  -     Hepatitis C Antibody; Future  -     HIV Screen;  Future  - PAP SMEAR; Future  -     Rubella antibody, IgG; Future  -     T. pallidum Ab; Future  -     Type and screen; Future  -     Urinalysis; Future  -     Urine Drug Screen; Future  -     POCT urine pregnancy  -     Vaginitis DNA Probe; Future    Missed menses  -     C.trachomatis N.gonorrhoeae DNA; Future  -     CBC with Auto Differential; Future  -     Culture, Urine; Future  -     Hepatitis B Surface Antigen Obstetric Panel; Future  -     Hepatitis C Antibody; Future  -     HIV Screen; Future  -     PAP SMEAR; Future  -     Rubella antibody, IgG; Future  -     T. pallidum Ab; Future  -     Type and screen; Future  -     Urinalysis; Future  -     Urine Drug Screen; Future  -     POCT urine pregnancy  -     Vaginitis DNA Probe; Future    BMI 39.0-39.9,adult  -     Glucose tolerance, 1 hour; Future    History of pre-eclampsia in prior pregnancy, currently pregnant    Rh negative state in antepartum period    Nausea and vomiting during pregnancy    Family history of diabetes mellitus in mother    Other orders  -     Prenatal MV & Min w/FA-DHA (PRENATAL ADULT GUMMY/DHA/FA) 0.4-25 MG CHEW; Take 1 each by mouth daily    Will need  fetal surveillance at 34 weeks due to bmi of 40    The patient was seen face to face and her full history was reviewed as well as physical exam completed. Initial labs ordered. *, urine and GC Cultures Collected. Take daily prenatal vitamins. Reviewed safety of current medications. Referrals placed    Risks and benefits of congenital disorder screening options reviewed. Cell free DNA testing was discussed: undecided Role of ultrasound in pregnancy discussed; fetal survey: undecided    Complete dating ultrasound and ACOG visit as indicated. Routine prenatal visit in approximately 4 weeks. Call/RTO sooner with any questions or concerns. Notify office if you experience an inability to keep water or food down for 24 hours due to nausea/vomiting or any vaginal bleeding.       The patient was counseled on the maternal and infant risks of tobacco abuse - including increased risk of  labor,  delivery, premature rupture of membranes, intrauterine growth restriction, intrauterine fetal demise and abruptio placenta. She was instructed to stop smoking if currently using tobacco. Morbidity, mortality, and cessation programs were reviewed. T Secondary smoke risks were also reviewed. Increases in cancer, respiratory problems, and sudden infant death syndrome were reviewed as well. Provided counseling on toxoplasmosis, diet, activity. Return for ACOG visit to review any genetic misnomer history, chromosomal abnormalities, or learning disabilities in  herself, the father of the baby or their families. Upon completion of the visit all questions were answered and the patients follow-up and testing schedule were reviewed. Orders Placed This Encounter   Procedures    C.trachomatis N.gonorrhoeae DNA     Standing Status:   Future     Standing Expiration Date:   2023    Culture, Urine     Standing Status:   Future     Standing Expiration Date:   2023     Order Specific Question:   Specify (ex-cath, midstream, cysto, etc)? Answer:   cc    Vaginitis DNA Probe     Standing Status:   Future     Standing Expiration Date:   2023    CBC with Auto Differential     Standing Status:   Future     Standing Expiration Date:   2023    Hepatitis B Surface Antigen Obstetric Panel     Standing Status:   Future     Standing Expiration Date:   2023    Hepatitis C Antibody     Standing Status:   Future     Standing Expiration Date:   2023    HIV Screen     Standing Status:   Future     Standing Expiration Date:   2023    PAP SMEAR     Patient History:    No LMP recorded.   OBGYN Status: Recent pregnancy  Past Surgical History:  No date: WISDOM TOOTH EXTRACTION      Social History    Tobacco Use      Smoking status: Never Smoker      Smokeless tobacco: Never Used Standing Status:   Future     Standing Expiration Date:   6/21/2023     Order Specific Question:   Collection Type     Answer: Thin Prep     Order Specific Question:   Prior Abnormal Pap Test     Answer:   No     Order Specific Question:   Screening or Diagnostic     Answer:   Screening     Order Specific Question:   HPV Requested? Answer:   Yes - If Abnormal Reflex HPV     Order Specific Question:   High Risk Patient     Answer:   N/A    Rubella antibody, IgG     Standing Status:   Future     Standing Expiration Date:   6/22/2023    T. pallidum Ab     Standing Status:   Future     Standing Expiration Date:   6/21/2023    Urinalysis     Standing Status:   Future     Standing Expiration Date:   6/21/2023     Order Specific Question:   SPECIFY(EX-CATH,MIDSTREAM,CYSTO,ETC)?      Answer:   Clean catch midstream    Urine Drug Screen     Standing Status:   Future     Standing Expiration Date:   6/22/2023    Glucose tolerance, 1 hour     Standing Status:   Future     Standing Expiration Date:   6/22/2023    POCT urine pregnancy    Type and screen     Standing Status:   Future     Standing Expiration Date:   6/21/2023      The patient was seen and evaluated by ABBEY Slaughter CNP

## 2022-06-21 NOTE — PATIENT INSTRUCTIONS
Patient Education        Learning About Pregnancy  Your Care Instructions     Your health in the early weeks of your pregnancy is particularly important for your baby's health. Take good care of yourself. Anything you do that harms yourbody can also harm your baby. Make sure to go to all of your doctor appointments. Regular checkups will helpkeep you and your baby healthy. How can you care for yourself at home? Diet     Eat a balanced diet. Make sure your diet includes plenty of beans, peas, and leafy green vegetables.      Do not skip meals or go for many hours without eating. If you are nauseated, try to eat a small, healthy snack every 2 to 3 hours.      Do not eat fish that has a high level of mercury, such as shark, swordfish, or mackerel. Do not eat more than one can of tuna each week.      Drink plenty of fluids. If you have kidney, heart, or liver disease and have to limit fluids, talk with your doctor before you increase the amount of fluids you drink.      Cut down on caffeine, such as coffee, tea, and cola.      Do not drink alcohol, such as beer, wine, or hard liquor.      Take a multivitamin that contains at least 400 micrograms (mcg) of folic acid to help prevent birth defects. Fortified cereal and whole wheat bread are good additional sources of folic acid.      Increase the calcium in your diet. Try to drink a quart of skim milk each day. You may also take calcium supplements and choose foods such as cheese and yogurt. Lifestyle     Make sure you go to your follow-up appointments.      Get plenty of rest. You may be unusually tired while you are pregnant.      Get at least 30 minutes of exercise on most days of the week. Walking is a good choice. If you have not exercised in the past, start out slowly. Take several short walks each day.      Do not smoke. If you need help quitting, talk to your doctor about stop-smoking programs. These can increase your chances of quitting for good.    Do not touch cat feces or litter boxes. Also, wash your hands after you handle raw meat, and fully cook all meat before you eat it. Wear gloves when you work in the yard or garden, and wash your hands well when you are done. Cat feces, raw or undercooked meat, and contaminated dirt can cause an infection that may harm your baby or lead to a miscarriage.      Avoid things that can make your body too hot and may be harmful to your baby, such as a hot tub or sauna. Or talk with your doctor before doing anything that raises your body temperature. Your doctor can tell you if it's safe.      Avoid chemical fumes, paint fumes, or poisons.      Do not use illegal drugs, marijuana, or alcohol. Medicines     Review all of your medicines with your doctor. Some of your routine medicines may need to be changed to protect your baby.      Use acetaminophen (Tylenol) to relieve minor problems, such as a mild headache or backache or a mild fever with cold symptoms. Do not use nonsteroidal anti-inflammatory drugs (NSAIDs), such as ibuprofen (Advil, Motrin) or naproxen (Aleve), unless your doctor says it is okay.      Do not take two or more pain medicines at the same time unless the doctor told you to. Many pain medicines have acetaminophen, which is Tylenol. Too much acetaminophen (Tylenol) can be harmful.      Take your medicines exactly as prescribed. Call your doctor if you think you are having a problem with your medicine. To manage morning sickness     If you feel sick when you first wake up, try eating a small snack (such as crackers) before you get out of bed. Allow some time to digest the snack, and then get out of bed slowly.      Do not skip meals or go for long periods without eating.  An empty stomach can make nausea worse.      Eat small, frequent meals instead of three large meals each day.      Drink plenty of fluids.      Eat foods that are high in protein but low in fat.      If you are taking iron supplements, ask your doctor if they are necessary. Iron can make nausea worse.      Avoid any smells, such as coffee, that make you feel sick.      Get lots of rest. Morning sickness may be worse when you are tired. Follow-up care is a key part of your treatment and safety. Be sure to make and go to all appointments, and call your doctor if you are having problems. It's also a good idea to know your test results and keep alist of the medicines you take. Where can you learn more? Go to https://[employer] Games.ENDYMION. org and sign in to your [employer] account. Enter O007 in the [employer] box to learn more about \"Learning About Pregnancy. \"     If you do not have an account, please click on the \"Sign Up Now\" link. Current as of: February 23, 2022               Content Version: 13.3  © 2006-2022 Healthwise, Incorporated. Care instructions adapted under license by [facility] ([facility]). If you have questions about a medical condition or this instruction, always ask your healthcare professional. [address] any warranty or liability for your use of this information.

## 2022-06-22 LAB
-: ABNORMAL
AMORPHOUS: ABNORMAL
BACTERIA: ABNORMAL
BILIRUBIN URINE: NEGATIVE
C TRACH DNA GENITAL QL NAA+PROBE: NEGATIVE
CANDIDA SPECIES, DNA PROBE: NEGATIVE
COLOR: YELLOW
CULTURE: NORMAL
EPITHELIAL CELLS UA: ABNORMAL /HPF (ref 0–5)
GARDNERELLA VAGINALIS, DNA PROBE: NEGATIVE
GLUCOSE URINE: NEGATIVE
KETONES, URINE: NEGATIVE
LEUKOCYTE ESTERASE, URINE: ABNORMAL
MUCUS: ABNORMAL
N. GONORRHOEAE DNA: NEGATIVE
NITRITE, URINE: NEGATIVE
PH UA: 6.5 (ref 5–8)
PROTEIN UA: NEGATIVE
RBC UA: ABNORMAL /HPF (ref 0–2)
SOURCE: NORMAL
SPECIFIC GRAVITY UA: 1.02 (ref 1–1.03)
SPECIMEN DESCRIPTION: NORMAL
SPECIMEN DESCRIPTION: NORMAL
TRICHOMONAS VAGINALIS DNA: NEGATIVE
TURBIDITY: CLEAR
URINE HGB: NEGATIVE
UROBILINOGEN, URINE: NORMAL
WBC UA: ABNORMAL /HPF (ref 0–5)

## 2022-06-30 LAB — CYTOLOGY REPORT: NORMAL

## 2022-07-11 ENCOUNTER — INITIAL PRENATAL (OUTPATIENT)
Dept: OBGYN CLINIC | Age: 22
End: 2022-07-11

## 2022-07-11 VITALS — BODY MASS INDEX: 41.16 KG/M2 | WEIGHT: 262.8 LBS | SYSTOLIC BLOOD PRESSURE: 104 MMHG | DIASTOLIC BLOOD PRESSURE: 74 MMHG

## 2022-07-11 DIAGNOSIS — Z3A.12 12 WEEKS GESTATION OF PREGNANCY: Primary | ICD-10-CM

## 2022-07-11 DIAGNOSIS — O26.891 RH NEGATIVE STATE IN ANTEPARTUM PERIOD, FIRST TRIMESTER: ICD-10-CM

## 2022-07-11 DIAGNOSIS — O09.291 HISTORY OF PRE-ECLAMPSIA IN PRIOR PREGNANCY, CURRENTLY PREGNANT IN FIRST TRIMESTER: ICD-10-CM

## 2022-07-11 DIAGNOSIS — O09.299 HISTORY OF PRE-ECLAMPSIA IN PRIOR PREGNANCY, CURRENTLY PREGNANT: ICD-10-CM

## 2022-07-11 DIAGNOSIS — Z67.91 RH NEGATIVE STATE IN ANTEPARTUM PERIOD, FIRST TRIMESTER: ICD-10-CM

## 2022-07-11 PROBLEM — Z3A.39 39 WEEKS GESTATION OF PREGNANCY: Status: RESOLVED | Noted: 2021-06-15 | Resolved: 2022-07-11

## 2022-07-11 PROBLEM — Z34.00 PRIMIGRAVIDA, ANTEPARTUM: Status: RESOLVED | Noted: 2020-12-18 | Resolved: 2022-07-11

## 2022-07-11 PROBLEM — Z23 NEED FOR TDAP VACCINATION: Status: RESOLVED | Noted: 2021-04-20 | Resolved: 2022-07-11

## 2022-07-11 PROBLEM — O14.90 PRE-ECLAMPSIA: Status: RESOLVED | Noted: 2021-06-15 | Resolved: 2022-07-11

## 2022-07-11 PROCEDURE — 0501F PRENATAL FLOW SHEET: CPT | Performed by: OBSTETRICS & GYNECOLOGY

## 2022-07-11 RX ORDER — ASPIRIN 81 MG/1
81 TABLET, CHEWABLE ORAL DAILY
Qty: 90 TABLET | Refills: 2 | Status: SHIPPED | OUTPATIENT
Start: 2022-07-11

## 2022-07-11 NOTE — PATIENT INSTRUCTIONS
Patient Education        Weeks 10 to 14 of Your Pregnancy: Care Instructions  Overview     By weeks 10 to 15 of your pregnancy, the placenta has formed inside your uterus. The placenta's main job is to give your baby oxygen and nutrientsthrough the umbilical cord. It's possible to hear your baby's heartbeat with a special ultrasound device. Your baby's organs are developing. The arms and legs can bend. This is a good time to think about testing for birth defects. There are two types of tests: screening and diagnostic. Screening tests show the chance that a baby has a certain birth defect. They can't tell you for sure that your babyhas a problem. Diagnostic tests show if a baby has a certain birth defect. It's your choice whether to have these tests. You and your partner can talk toyour doctor or midwife about tests for birth defects. Follow-up care is a key part of your treatment and safety. Be sure to make and go to all appointments, and call your doctor if you are having problems. It's also a good idea to know your test results and keep alist of the medicines you take. How can you care for yourself at home? Decide about tests   You can have screening tests and diagnostic tests to check for birth defects. The decision to have a test for birth defects is personal. Think about your age, your chance of passing on a family disease, your need to know about any problems, and what you might do after you have the test results. ? Quadruple (quad) blood test. This screening test can be done between 15 and 22 weeks of pregnancy. It checks the amount of four substances in your blood. The doctor looks at these test results, along with your age and other factors, to find out the chance that your baby may have certain problems. ? Amniocentesis. This diagnostic test is used to look for chromosomal problems in the baby's cells.  It can be done between 15 and 20 weeks of pregnancy, usually around week 16.  ? Nuchal translucency test. This test uses ultrasound to measure the thickness of the area at the back of the baby's neck. An increase in the thickness can be an early sign of Down syndrome. ? Chorionic villus sampling (CVS). This is a test that looks for certain genetic problems with your baby. The same genes that are in your baby are in the placenta. A small piece of the placenta is taken out and tested. This test is done when you are 10 to 13 weeks pregnant. Ease discomfort   Slow down and take naps when you feel tired.  If your emotions swing, talk to someone.  If your gums bleed, try a softer toothbrush. If your gums are puffy and bleed a lot, see your dentist.  Tiera Hinders If you feel dizzy:  ? Get up slowly after sitting or lying down. ? Drink plenty of fluids. ? Eat small snacks to keep your blood sugar stable. ? Put your head between your legs as though you were tying your shoelaces. ? Lie down with your legs higher than your head. Use pillows to prop up your feet.  If you have a headache:  ? Lie down. ? Ask your partner or a good friend for a neck massage. ? Try cool cloths over your forehead or across the back of your neck. ? Use acetaminophen (Tylenol) for pain relief. Do not use nonsteroidal anti-inflammatory drugs (NSAIDs), such as ibuprofen (Advil, Motrin) or naproxen (Aleve), unless your doctor says it is okay.  If you have a nosebleed, pinch your nose gently, and hold it for a short while. To prevent nosebleeds, try massaging a small dab of petroleum jelly, such as Vaseline, in your nostrils.  If your nose is stuffed up, try saline (saltwater) nose sprays. Do not use decongestant sprays. Care for your breasts   Wear a bra that gives you good support.  Know that changes in your breasts are normal.  ? Your breasts may get larger and more tender. Tenderness usually gets better by 12 weeks. ? Your nipples may get darker and larger, and small bumps around your nipples may show more. ?  The veins in your chest and breasts may show more. Where can you learn more? Go to https://chpepiceweb.healthPreply.com. org and sign in to your [employer] account. Enter Q524 in the [employer] box to learn more about \"Weeks 10 to 14 of Your Pregnancy: Care Instructions. \"     If you do not have an account, please click on the \"Sign Up Now\" link. Current as of: February 23, 2022               Content Version: 13.3  © 7839-4413 Healthwise, Incorporated. Care instructions adapted under license by [facility] ([facility] If you have questions about a medical condition or this instruction, always ask your healthcare professional. [address] any warranty or liability for your use of this information.

## 2022-07-11 NOTE — PROGRESS NOTES
Relationship with FOB: , living together, 2nd pregnancy together  Partner's name: Jose Will to Breast feeding  Pain Score:0/10  Job title:[employer] and [employer]   This is a planned pregnancy:Yes  Certain LMP:Yes  S/S of pregnancy:Yes, miss period and you planning pregnancy. Hx N/V pregnancy:Nausea mild and no emesis. Mother's ethnicity:    Father's ethnicity:       -  Patient Active Problem List   Diagnosis    Fam H/O DM (pt's mom)    Maternal obesity (BMI 40)    First pregnancy    H/O headaches    Influenza vaccine declined    Rh negative state    Fetal adipex exposure    History of heavy periods    SMA carrier status    Need for Tdap vaccination    Tachycardia    39 weeks gestation of pregnancy    Pre-eclampsia w/ SF      19 F Apg 8/9 Wt 7#8     History of pre-eclampsia in prior pregnancy, currently pregnant    BMI 39.0-39.9,adult     Blood pressure 104/74, weight 262 lb 12.8 oz (119.2 kg), last menstrual period 04/15/2022, not currently breastfeeding. Nenita Terence is a 23 y.o. Ara Burrell, here for her ACOG. The patients past medical, surgical, social and family history were reviewed. Current medications and allergies were reviewed, and documented in the chart. Menstrual history: Regular  Birth control: Nothing and plans not start on any hormonal BC. Wt Readings from Last 3 Encounters:   22 262 lb 12.8 oz (119.2 kg)   22 259 lb (117.5 kg)   21 249 lb (112.9 kg)     Recent Results (from the past 8736 hour(s))   GYN Cytology    Collection Time: 22  8:38 AM   Result Value Ref Range    Cytology Report       INTERPRETATION    Cervical material, (ThinPrep vial, Imaging-assisted review):  Specimen Adequacy:       Satisfactory for evaluation.       -Endocervical/transformation zone component is absent. Descriptive Diagnosis:       Negative for intraepithelial lesion or malignancy. Cytotechnologist:   [place] [facility](ASCP)  **Electronically Signed Out**  ey/6/30/2022          Source:  A: Cervical material, (ThinPrep vial, Imaging-assisted review)    Clinical History  Pregnant: N92.6  High risk HPV DNA testing is requested if the diagnosis is abnormal    GYNECOLOGIC CYTOLOGY REPORT    Patient Name: Claudio Hussein [facility] Rec: [id number]  Path Number: [id number]  [address] [place] [address], [place] [address]. [place], [address]  [phone]  Fax: [phone]     POCT urine pregnancy    Collection Time: 06/21/22  4:03 PM   Result Value Ref Range    Preg Test, Ur positive     Control present    Vaginitis DNA Probe    Collection Time: 06/21/22 10:11 PM    Specimen: Vaginal   Result Value Ref Range    Source . VAGINAL SWAB     Trichomonas Vaginalis DNA NEGATIVE NEGATIVE    GARDNERELLA VAGINALIS, DNA PROBE NEGATIVE NEGATIVE    CANDIDA SPECIES, DNA PROBE NEGATIVE NEGATIVE   Urine Drug Screen    Collection Time: 06/21/22 10:12 PM   Result Value Ref Range    Amphetamine Screen, Ur NEGATIVE NEGATIVE    Barbiturate Screen, Ur NEGATIVE NEGATIVE    Benzodiazepine Screen, Urine NEGATIVE NEGATIVE    Cocaine Metabolite, Urine NEGATIVE NEGATIVE    Methadone Screen, Urine NEGATIVE NEGATIVE    Opiates, Urine NEGATIVE NEGATIVE    Phencyclidine, Urine NEGATIVE NEGATIVE    Cannabinoid Scrn, Ur NEGATIVE NEGATIVE    Oxycodone Screen, Ur NEGATIVE NEGATIVE    Test Information       Assay provides medical screening only. The absence of expected drug(s) and/or metabolite(s) may indicate diluted or adulterated urine, limitations of testing or timing of collection.    Urinalysis    Collection Time: 06/21/22 10:12 PM   Result Value Ref Range    Color, UA Yellow Yellow    Turbidity UA Clear Clear    Glucose, Ur NEGATIVE NEGATIVE    Bilirubin Urine NEGATIVE NEGATIVE    Ketones, Urine NEGATIVE NEGATIVE    Specific Gravity, UA 1.023 1.005 - 1.030 MG CHEW Take 1 each by mouth daily 1 tablet 11    vitamin B-6 (B-6) 25 MG tablet Take 1 tablet by mouth 2 times daily 60 tablet 1    EPINEPHrine (EPIPEN) 0.3 MG/0.3ML SOAJ injection Inject 0.3 mLs into the muscle once for 1 dose Use as directed for allergic reaction 1 each 0     No current facility-administered medications for this visit. ALLERGIES:  Bee venom    Reviewed global and practice OB care including nausea measures, nutrition, activities, warning signs, and contact information. Offered cell free DNA screen,NT echo and WIC .    `--------------------------------------------------------------------------  Genetic Screening/Teratology Counseling  (Include patient, FOB or anyone in either family)    1) Patient's age 28 years or > at [place]: No  2) Thalassemia (Mediterranean, ): No  3) Neural Tube Defect:   No  4) Congenital heart defect:   No  5) Trisomy (e.g. Down Syndrome):  No  6) David-sachs ([employer], [address]): No  7) Multiple Births:    No  8) Sickle cell (disease or trait):  No  9) Hemophilia or blood disorders:  No  10) Muscular Dystrophy:   No  11) Cystic Fibrosis:    No  12) Neftali's chorea:   No  13) Mental retardation/Autism :  No   If yes, was person tested for fragile X: No  14) Other inherited genetic/chromosomal disorder: No  15) Maternal metabolic disorder (DM, PKU): No  16) Child with birth defect not listed:  No  17) Recurrent pregnancy loss/stillbirth: No  18) Medications, supplements/illicit or   Recreational drugs/alcohol since LMP: No   List: none  19) Any other:   none    Comments/Counseling: Pt presents for ACOG and OB education with nurse.   -POC denies any genetic testing  -POC return on 7/14/22 for dating US and prenatal labs with  Early 1-hr gtt  -POC start baby ASA 81 mg tab for hx PreE w/SF last pregnancy and Hx BMI 41  -------------------------------------------------------------------------  Infection History:    1) Live with someone with TB/exposed to TB: No  2) Patient/partner has h/o genital herpes: No  3) Rash/viral illness since LMP:  No  4) History of STD:    No  5) Other: No  -------------------------------------------------------------------------   Check list reviewed with New OB patient:    [facility]  -Delivery only at [facility]  -Several providers in practice and only doctors do deliveries  -May reach practice via [address] or phone. [employer] messages are only answered M-Fri when office is open. Testing  -Genetic testing (NIPT, AFP and/or referral to [facility])  -Testing per ACOG guidelines that are needed for any pregnancy  -Testing may be added according to your needs or if you become high risk  -Normal pregnancy US, one dating and one 20 week anatomy scan  -referral to [facility] each patient 20 week Ultrasound only    Appts:  -q 4 wks until your 28 wks  -@ 28wk q2wks  -@ 36wks q week  -this changes if you have increased risk factors    Diet:  -Nothing unpasteurized  -Lunch meats need to be steamed or heated  -Meats need to be thoroughly cooked, nothing pink or bldg  -Caffeine MAX per ACOG 16 oz/per day  -Artifical sweetener, limit no confirmed abnormal findings with development of fetus   -Fish, detailed list provided in OB packet, avoid large fish and only so many oz per week  -If you are vegan or vegetarian. OB pt needs 60 gm protein per day. -Caution with dehydration may cause cramping.      Exercise,Traveling and safety  -No sit ups after 14 weeks  -HR needs to be <160  -No heavy squatting  -nothing where you can fall and hurt yourself  (your center of gravity is not same when pregnant)  -Ask your provider if it's safe for you to fly  -long travel need to get up and walk around after 2 hours  -wear seat belt below gravid abd  -good support socks while traveling to aid with circulation    Advise  -Review need for vaccines in pregnancy COVID, FLU and T-dap,   -No swimming in natural bodies of water, lakes, ponds or

## 2022-07-14 ENCOUNTER — HOSPITAL ENCOUNTER (OUTPATIENT)
Age: 22
Setting detail: SPECIMEN
Discharge: HOME | End: 2022-07-14

## 2022-07-14 DIAGNOSIS — Z3A.12 12 WEEKS GESTATION OF PREGNANCY: ICD-10-CM

## 2022-07-14 DIAGNOSIS — O26.891 RH NEGATIVE STATE IN ANTEPARTUM PERIOD, FIRST TRIMESTER: ICD-10-CM

## 2022-07-14 DIAGNOSIS — N92.6 MISSED MENSES: ICD-10-CM

## 2022-07-14 DIAGNOSIS — Z32.01 POSITIVE PREGNANCY TEST: ICD-10-CM

## 2022-07-14 DIAGNOSIS — O09.291 HISTORY OF PRE-ECLAMPSIA IN PRIOR PREGNANCY, CURRENTLY PREGNANT IN FIRST TRIMESTER: ICD-10-CM

## 2022-07-14 DIAGNOSIS — Z67.91 RH NEGATIVE STATE IN ANTEPARTUM PERIOD, FIRST TRIMESTER: ICD-10-CM

## 2022-07-14 LAB
ABO/RH: NORMAL
ABSOLUTE EOS #: 0.1 K/UL (ref 0–0.44)
ABSOLUTE IMMATURE GRANULOCYTE: 0.12 K/UL (ref 0–0.3)
ABSOLUTE LYMPH #: 2.23 K/UL (ref 1.1–3.7)
ABSOLUTE MONO #: 0.77 K/UL (ref 0.1–1.2)
ALBUMIN SERPL-MCNC: 3.8 G/DL (ref 3.5–5.2)
ALBUMIN/GLOBULIN RATIO: 1.4 (ref 1–2.5)
ALP BLD-CCNC: 50 U/L (ref 35–104)
ALT SERPL-CCNC: 16 U/L (ref 5–33)
ANION GAP SERPL CALCULATED.3IONS-SCNC: 11 MMOL/L (ref 9–17)
ANTIBODY SCREEN: NEGATIVE
AST SERPL-CCNC: 16 U/L
BASOPHILS # BLD: 0 % (ref 0–2)
BASOPHILS ABSOLUTE: 0.03 K/UL (ref 0–0.2)
BILIRUB SERPL-MCNC: <0.1 MG/DL (ref 0.3–1.2)
BUN BLDV-MCNC: 10 MG/DL (ref 6–20)
CALCIUM SERPL-MCNC: 9.2 MG/DL (ref 8.6–10.4)
CHLORIDE BLD-SCNC: 102 MMOL/L (ref 98–107)
CO2: 21 MMOL/L (ref 20–31)
CREAT SERPL-MCNC: 0.56 MG/DL (ref 0.5–0.9)
EOSINOPHILS RELATIVE PERCENT: 1 % (ref 1–4)
GFR AFRICAN AMERICAN: >60 ML/MIN
GFR NON-AFRICAN AMERICAN: >60 ML/MIN
GFR SERPL CREATININE-BSD FRML MDRD: ABNORMAL ML/MIN/{1.73_M2}
GLUCOSE ADMINISTRATION: NORMAL
GLUCOSE BLD-MCNC: 131 MG/DL (ref 70–99)
GLUCOSE TOLERANCE SCREEN 50G: 131 MG/DL (ref 70–135)
HCT VFR BLD CALC: 39.3 % (ref 36.3–47.1)
HEMOGLOBIN: 12.3 G/DL (ref 11.9–15.1)
HEPATITIS B SURFACE ANTIGEN: NONREACTIVE
HEPATITIS C ANTIBODY: NONREACTIVE
HISTORY CHECK: NORMAL
HIV AG/AB: NONREACTIVE
IMMATURE GRANULOCYTES: 1 %
LYMPHOCYTES # BLD: 14 % (ref 24–43)
MCH RBC QN AUTO: 26.6 PG (ref 25.2–33.5)
MCHC RBC AUTO-ENTMCNC: 31.3 G/DL (ref 28.4–34.8)
MCV RBC AUTO: 85.1 FL (ref 82.6–102.9)
MONOCYTES # BLD: 5 % (ref 3–12)
NRBC AUTOMATED: 0 PER 100 WBC
PDW BLD-RTO: 14 % (ref 11.8–14.4)
PLATELET # BLD: 262 K/UL (ref 138–453)
PMV BLD AUTO: 12 FL (ref 8.1–13.5)
POTASSIUM SERPL-SCNC: 4.2 MMOL/L (ref 3.7–5.3)
RBC # BLD: 4.62 M/UL (ref 3.95–5.11)
RUBV IGG SER QL: >500 IU/ML
SEG NEUTROPHILS: 79 % (ref 36–65)
SEGMENTED NEUTROPHILS ABSOLUTE COUNT: 12.75 K/UL (ref 1.5–8.1)
SODIUM BLD-SCNC: 134 MMOL/L (ref 135–144)
T. PALLIDUM, IGG: NONREACTIVE
TOTAL PROTEIN: 6.6 G/DL (ref 6.4–8.3)
WBC # BLD: 16 K/UL (ref 3.5–11.3)

## 2022-07-16 LAB
REASON FOR REJECTION: NORMAL
ZZ NTE CLEAN UP: ORDERED TEST: NORMAL
ZZ NTE WITH NAME CLEAN UP: SPECIMEN SOURCE: NORMAL

## 2022-07-20 DIAGNOSIS — O09.291 HISTORY OF PRE-ECLAMPSIA IN PRIOR PREGNANCY, CURRENTLY PREGNANT IN FIRST TRIMESTER: ICD-10-CM

## 2022-07-20 DIAGNOSIS — Z3A.12 12 WEEKS GESTATION OF PREGNANCY: Primary | ICD-10-CM

## 2022-08-15 ENCOUNTER — NURSE TRIAGE (OUTPATIENT)
Dept: OTHER | Age: 22
End: 2022-08-15

## 2022-08-15 NOTE — TELEPHONE ENCOUNTER
Answer Assessment - Initial Assessment Questions  1. REASON FOR CALL or QUESTION: \"What is your reason for calling today? \" or \"How can I best help you? \" or \"What question do you have that I can help answer? \"     Patient is 17 weeks pregnant calling today because she want to know how long she need to do fasting before her 3hr glucose test in the morning at 8am. Patient is also asking what time she needs to stop eating tonight. Patient would like to speak with the on call provider tonight to discuss. Protocols used:  Information Only Call - No Triage-ADULT-

## 2022-08-15 NOTE — TELEPHONE ENCOUNTER
Informed patient that the on call provider will be notified and will be calling her back. Writer added that if she will not hear from the on call provider after 20mins to call the office back. Patient verbalized understanding.  Sent message to Lucinda Olvera MD via [facility] who is on call at 5:22pm. Message read at  5:22pm. OMAR RN

## 2022-08-16 ENCOUNTER — HOSPITAL ENCOUNTER (OUTPATIENT)
Age: 22
Setting detail: SPECIMEN
Discharge: HOME | End: 2022-08-16

## 2022-08-16 DIAGNOSIS — Z3A.12 12 WEEKS GESTATION OF PREGNANCY: ICD-10-CM

## 2022-08-16 DIAGNOSIS — Z83.3 FAMILY HISTORY OF DIABETES MELLITUS IN MOTHER: ICD-10-CM

## 2022-08-16 DIAGNOSIS — O09.291 HISTORY OF PRE-ECLAMPSIA IN PRIOR PREGNANCY, CURRENTLY PREGNANT IN FIRST TRIMESTER: ICD-10-CM

## 2022-08-16 DIAGNOSIS — Z32.01 POSITIVE PREGNANCY TEST: ICD-10-CM

## 2022-08-16 LAB
AMOUNT GLUCOSE GIVEN: 100 G
CREATININE URINE: 74.8 MG/DL (ref 28–217)
GLUCOSE FASTING: 76 MG/DL (ref 65–99)
GLUCOSE TOLERANCE TEST 1 HOUR: 144 MG/DL (ref 65–184)
GLUCOSE TOLERANCE TEST 2 HOUR: 133 MG/DL (ref 65–139)
GLUCOSE TOLERANCE TEST 3 HOUR: 111 MG/DL (ref 65–130)
TOTAL PROTEIN, URINE: 7 MG/DL
URINE TOTAL PROTEIN CREATININE RATIO: 0.09 (ref 0–0.2)

## 2022-08-19 ENCOUNTER — ROUTINE PRENATAL (OUTPATIENT)
Dept: OBGYN CLINIC | Age: 22
End: 2022-08-19

## 2022-08-19 VITALS
SYSTOLIC BLOOD PRESSURE: 110 MMHG | DIASTOLIC BLOOD PRESSURE: 68 MMHG | WEIGHT: 259 LBS | BODY MASS INDEX: 40.65 KG/M2 | HEIGHT: 67 IN

## 2022-08-19 DIAGNOSIS — Z67.91 RH NEGATIVE STATE IN ANTEPARTUM PERIOD: ICD-10-CM

## 2022-08-19 DIAGNOSIS — Z34.82 ENCOUNTER FOR SUPERVISION OF OTHER NORMAL PREGNANCY IN SECOND TRIMESTER: ICD-10-CM

## 2022-08-19 DIAGNOSIS — O99.810 ABNORMAL GLUCOSE TOLERANCE AFFECTING PREGNANCY, ANTEPARTUM: ICD-10-CM

## 2022-08-19 DIAGNOSIS — O26.899 RH NEGATIVE STATE IN ANTEPARTUM PERIOD: ICD-10-CM

## 2022-08-19 DIAGNOSIS — Z3A.18 18 WEEKS GESTATION OF PREGNANCY: Primary | ICD-10-CM

## 2022-08-19 PROBLEM — Z28.21 INFLUENZA VACCINE REFUSED: Status: RESOLVED | Noted: 2020-12-18 | Resolved: 2022-08-19

## 2022-08-19 PROCEDURE — 0502F SUBSEQUENT PRENATAL CARE: CPT | Performed by: STUDENT IN AN ORGANIZED HEALTH CARE EDUCATION/TRAINING PROGRAM

## 2022-08-19 NOTE — PROGRESS NOTES
Prenatal Visit    Ivan Pulliam is a 23 y.o. female  at 18w0d    Subjective: The patient was seen and evaluated. Reports Negative fetal movements. She denies abdominal pain, vaginal bleeding and leakage of fluid. Signs and symptoms of  labor as well as labor were reviewed. Dates were reviewed with the patient. Estimated Date of Delivery: 23          Flu shot due this fall. The problem list reflects the active issues addressed during today's visit    VITALS:    BP: 110/68  Weight: 259 lb (117.5 kg)  Fetal HR: 156  Movement: Absent       Assessment & Plan:  Ivan Pulliam is a 23 y.o. female  at 19w0d   - An 18-22 week anatomy ultrasound has been ordered   - The ACIP recommended pregnant patients be included in phase 1C of vaccine distribution. This decision is supported by [facility] and ACOG. As of 2021, there have been over 30,000 pregnant patients included in the V-safe post COVID vaccination safety . Most (73%) reports to VAERS among pregnant women involved non-pregnancyspecific adverse events (e.g., local and systemic reactions). Miscarriage was the most frequently reported pregnancy-specific adverse event to VAERS; numbers are within the known background rates based on presumed COVID-19 vaccine doses administered to pregnant women. No unexpected pregnancy or infant outcomes have been observed related to  COVID-19 vaccination during pregnancy. Recommended patient proceed with vaccination. Booster pending.   - Encouraged use of 81 mg ASA due to Hx Pre-E and USPSTF guidelines.       Patient Active Problem List    Diagnosis Date Noted    Elevated Early 1 hr, normal 3 hr GTT. 2022     Priority: Medium     Needs repeat 3 hr at 28 weeks      Hx Pre-E (G1) 2022     Priority: Medium     Baby ASA @ 12 wks      Tachycardia 2021     And possible presyncopal episode, suspect POTS  Baseline EKG wnl 21      SMA carrier status 2021 SMN1+ carrier  Negative FOB testing      History of heavy periods 2021     OCPs in the past caused worsening acne and weight gain      Rh negative state 2021             Fam H/O DM (pt's mom) 2020     Early 1h       BMI 41 2020      testing 34 weeks      H/O headaches 2020     Usually treats with advil outside of pregnancy       Return in about 4 weeks (around 2022) for [facility]   2022, 10:53 AM

## 2022-09-06 ENCOUNTER — ROUTINE PRENATAL (OUTPATIENT)
Dept: PERINATAL CARE | Age: 22
End: 2022-09-06
Payer: COMMERCIAL

## 2022-09-06 VITALS
RESPIRATION RATE: 16 BRPM | WEIGHT: 264 LBS | TEMPERATURE: 97.9 F | SYSTOLIC BLOOD PRESSURE: 114 MMHG | HEIGHT: 67 IN | BODY MASS INDEX: 41.44 KG/M2 | DIASTOLIC BLOOD PRESSURE: 62 MMHG | HEART RATE: 80 BPM

## 2022-09-06 DIAGNOSIS — Z3A.20 20 WEEKS GESTATION OF PREGNANCY: ICD-10-CM

## 2022-09-06 DIAGNOSIS — Z36.86 ENCOUNTER FOR SCREENING FOR RISK OF PRE-TERM LABOR: ICD-10-CM

## 2022-09-06 DIAGNOSIS — O99.212 OBESITY AFFECTING PREGNANCY IN SECOND TRIMESTER: ICD-10-CM

## 2022-09-06 DIAGNOSIS — O28.5 ABNORMAL GENETIC TEST DURING PREGNANCY: ICD-10-CM

## 2022-09-06 DIAGNOSIS — O09.899 SHORT INTERVAL BETWEEN PREGNANCIES COMPLICATING PREGNANCY, ANTEPARTUM: ICD-10-CM

## 2022-09-06 DIAGNOSIS — O09.292 HISTORY OF PRE-ECLAMPSIA IN PRIOR PREGNANCY, CURRENTLY PREGNANT IN SECOND TRIMESTER: ICD-10-CM

## 2022-09-06 LAB
ABDOMINAL CIRCUMFERENCE: NORMAL
ABDOMINAL CIRCUMFERENCE: NORMAL
BIPARIETAL DIAMETER: NORMAL
BIPARIETAL DIAMETER: NORMAL
ESTIMATED FETAL WEIGHT: NORMAL
ESTIMATED FETAL WEIGHT: NORMAL
FEMORAL DIAMETER: NORMAL
FEMORAL DIAMETER: NORMAL
HC/AC: NORMAL
HC/AC: NORMAL
HEAD CIRCUMFERENCE: NORMAL
HEAD CIRCUMFERENCE: NORMAL

## 2022-09-06 PROCEDURE — 76817 TRANSVAGINAL US OBSTETRIC: CPT | Performed by: OBSTETRICS & GYNECOLOGY

## 2022-09-06 PROCEDURE — 99215 OFFICE O/P EST HI 40 MIN: CPT | Performed by: OBSTETRICS & GYNECOLOGY

## 2022-09-06 PROCEDURE — 76811 OB US DETAILED SNGL FETUS: CPT | Performed by: OBSTETRICS & GYNECOLOGY

## 2022-09-09 ENCOUNTER — TELEPHONE (OUTPATIENT)
Dept: OBGYN CLINIC | Age: 22
End: 2022-09-09

## 2022-09-09 NOTE — TELEPHONE ENCOUNTER
OB 21 ks needs note, Planning on going on [place] October, and needs a note of wellness(EDC, gestational age and that she is  in good health). Pt has office visit with  on 09/16/22.

## 2022-09-16 ENCOUNTER — ROUTINE PRENATAL (OUTPATIENT)
Dept: OBGYN CLINIC | Age: 22
End: 2022-09-16

## 2022-09-16 VITALS
SYSTOLIC BLOOD PRESSURE: 112 MMHG | BODY MASS INDEX: 41.38 KG/M2 | DIASTOLIC BLOOD PRESSURE: 78 MMHG | WEIGHT: 264.2 LBS | HEART RATE: 70 BPM

## 2022-09-16 DIAGNOSIS — Z3A.22 22 WEEKS GESTATION OF PREGNANCY: ICD-10-CM

## 2022-09-16 DIAGNOSIS — O09.92 HIGH-RISK PREGNANCY IN SECOND TRIMESTER: Primary | ICD-10-CM

## 2022-09-16 PROCEDURE — 0502F SUBSEQUENT PRENATAL CARE: CPT | Performed by: STUDENT IN AN ORGANIZED HEALTH CARE EDUCATION/TRAINING PROGRAM

## 2022-09-16 NOTE — PROGRESS NOTES
Detail Level: Detailed Prenatal Visit    Cade Hzael is a 23 y.o. female  at 24w0d    The patient was seen and evaluated. Reports positive fetal movements. She denies headache, vision changes, RUQ pain, contractions, vaginal bleeding and leakage of fluid. Requesting note for [address] [place] [address] stating she is in good health to go on a cruise. Otherwise has no complaints today. The problem list reflects the active issues addressed during today's visit    Vitals:  BP: 112/78  Weight: 264 lb 3.2 oz (119.8 kg)  Heart Rate: 70  Fundal Height (cm): 22 cm  Fetal HR: 156  Movement: Present     Labs:   ABO/Rh   Date Value Ref Range Status   2022 A NEGATIVE  Final       Assessment & Plan:  Cade Hazel is a 23 y.o. female  at 22w0d IUP   - VSS    - Rh negative, rhogam @ 28 weeks    - Will order 3rd trimester labs at her next appointment    - Note given to patient for her upcoming cruise    - Last saw M 22 where microcephaly and funic cord presentation were seen. Patient declined infection titers. Sees [facility] next on 10/19/22 for re assesment of presentation and growth    - Did not get AFP completed    - Declined NIPT   - S/p COVID19 shots   - Due for flu shot this season    Return in about 4 weeks (around 10/14/2022) for [employer] [employer] 9038.             Patient Active Problem List    Diagnosis Date Noted    Hx Pre-E (G1) 2022     Priority: Medium     Baby ASA @ 12 wks      Elevated Early 1 hr, normal 3 hr GTT. 2022     Needs repeat 3 hr at 28 weeks      Tachycardia 2021     And possible presyncopal episode, suspect POTS  Baseline EKG wnl 21      SMA carrier status 2021     SMN1+ carrier  Negative FOB testing      History of heavy periods 2021     OCPs in the past caused worsening acne and weight gain      Rh negative state 2021             Fam H/O DM (pt's mom) 2020     Early 1h       BMI 41 2020      testing 34 weeks      H/O headaches 2020 Quality 431: Preventive Care And Screening: Unhealthy Alcohol Use - Screening: Patient screened for unhealthy alcohol use using a single question and scores less than 2 times per year Usually treats with advil outside of pregnancy           Alessandro Dye, [address] [facility]   9/16/2022, 11:23 AM

## 2022-09-19 NOTE — TELEPHONE ENCOUNTER
Pt was seen by [facility] and letter was done

## 2022-10-18 ENCOUNTER — ROUTINE PRENATAL (OUTPATIENT)
Dept: OBGYN CLINIC | Age: 22
End: 2022-10-18

## 2022-10-18 VITALS
WEIGHT: 269 LBS | BODY MASS INDEX: 42.13 KG/M2 | HEART RATE: 72 BPM | SYSTOLIC BLOOD PRESSURE: 118 MMHG | DIASTOLIC BLOOD PRESSURE: 72 MMHG

## 2022-10-18 DIAGNOSIS — O99.810 ABNORMAL GLUCOSE TOLERANCE AFFECTING PREGNANCY, ANTEPARTUM: ICD-10-CM

## 2022-10-18 DIAGNOSIS — Z14.8 GENETIC CARRIER STATUS: ICD-10-CM

## 2022-10-18 DIAGNOSIS — Z34.93 THIRD TRIMESTER PREGNANCY: ICD-10-CM

## 2022-10-18 DIAGNOSIS — O26.899 RH NEGATIVE STATE IN ANTEPARTUM PERIOD: ICD-10-CM

## 2022-10-18 DIAGNOSIS — Z3A.26 26 WEEKS GESTATION OF PREGNANCY: Primary | ICD-10-CM

## 2022-10-18 DIAGNOSIS — Z67.91 RH NEGATIVE STATE IN ANTEPARTUM PERIOD: ICD-10-CM

## 2022-10-18 DIAGNOSIS — Z83.3 FAMILY HISTORY OF DIABETES MELLITUS IN MOTHER: ICD-10-CM

## 2022-10-18 DIAGNOSIS — O09.299 HISTORY OF PRE-ECLAMPSIA IN PRIOR PREGNANCY, CURRENTLY PREGNANT: ICD-10-CM

## 2022-10-18 PROCEDURE — 0502F SUBSEQUENT PRENATAL CARE: CPT | Performed by: OBSTETRICS & GYNECOLOGY

## 2022-10-18 NOTE — PATIENT INSTRUCTIONS
Please remember to keep your appointment with [facility] tomorrow. We will order your blood work as discussed in the office today. Return to the office in 2-3 weeks.

## 2022-10-18 NOTE — PROGRESS NOTES
Trudi Merlin is a 64-year-old -0-0-1 now at 26.4 weeks gestation here for routine care. She presents today with no complaints. She is having normal daily FM. She denies any HA, visual changes, RUQ T, VB/LOF/CTX. Pregnancy is complicated by:   Diagnosis Orders   1. 26 weeks gestation of pregnancy        2. Presentation of cord, single or unspecified fetus        3. Rh negative state        4. Elevated Early 1 hr, normal 3 hr GTT. 5. Hx Pre-E (G1)        6. BMI 41        7. Fam H/O DM (pt's mom)        8. SMA carrier status          Physical exam:  Vitals:    10/18/22 0918   BP: 118/72   Pulse: 72   Weight: 269 lb (122 kg)     General appearance: Patient is in NAD. Abdomen obese, gravid with FH = 27-28 cm.  bpm.  Extremities nontender without edema bilaterally. Assessment/plan:   Diagnosis Orders   1. 26 weeks gestation of pregnancy        2. Presentation of cord, single or unspecified fetus        3. Rh negative state        4. Elevated Early 1 hr, normal 3 hr GTT. 5. Hx Pre-E (G1)        6. BMI 41        7. Fam H/O DM (pt's mom)        8. SMA carrier status        9. Third trimester pregnancy          Continue ASA 81 mg daily. Going on a cruise next week and desires RhoGAM and flu vaccine at next visit. 1 hour GTT with CBC ordered. Has MFM appointment tomorrow on 10/19/2022. RTO in 2-3 weeks. Ana Paula Phillips MD

## 2022-10-19 ENCOUNTER — ROUTINE PRENATAL (OUTPATIENT)
Dept: PERINATAL CARE | Age: 22
End: 2022-10-19
Payer: COMMERCIAL

## 2022-10-19 VITALS
TEMPERATURE: 97.1 F | DIASTOLIC BLOOD PRESSURE: 76 MMHG | RESPIRATION RATE: 16 BRPM | HEART RATE: 112 BPM | BODY MASS INDEX: 42.53 KG/M2 | HEIGHT: 67 IN | WEIGHT: 271 LBS | SYSTOLIC BLOOD PRESSURE: 120 MMHG

## 2022-10-19 DIAGNOSIS — O99.212 OBESITY AFFECTING PREGNANCY IN SECOND TRIMESTER: ICD-10-CM

## 2022-10-19 DIAGNOSIS — Z03.71 SUSPECTED PROBLEM WITH AMNIOTIC CAVITY AND MEMBRANE NOT FOUND: ICD-10-CM

## 2022-10-19 DIAGNOSIS — O28.5 ABNORMAL GENETIC TEST DURING PREGNANCY: ICD-10-CM

## 2022-10-19 DIAGNOSIS — O35.07X0 FETAL MICROCEPHALY AFFECTING ANTEPARTUM CARE OF MOTHER, SINGLE OR UNSPECIFIED FETUS: Primary | ICD-10-CM

## 2022-10-19 DIAGNOSIS — Z36.4 ULTRASOUND FOR ANTENATAL SCREENING FOR FETAL GROWTH RESTRICTION: ICD-10-CM

## 2022-10-19 DIAGNOSIS — Z03.74 SUSPECTED PROBLEM WITH FETAL GROWTH NOT FOUND: ICD-10-CM

## 2022-10-19 DIAGNOSIS — O09.899 SHORT INTERVAL BETWEEN PREGNANCIES COMPLICATING PREGNANCY, ANTEPARTUM: ICD-10-CM

## 2022-10-19 DIAGNOSIS — O09.292 HISTORY OF PRE-ECLAMPSIA IN PRIOR PREGNANCY, CURRENTLY PREGNANT IN SECOND TRIMESTER: ICD-10-CM

## 2022-10-19 DIAGNOSIS — Z3A.26 26 WEEKS GESTATION OF PREGNANCY: ICD-10-CM

## 2022-10-19 PROCEDURE — 99999 PR OFFICE/OUTPT VISIT,PROCEDURE ONLY: CPT | Performed by: OBSTETRICS & GYNECOLOGY

## 2022-10-19 PROCEDURE — 76817 TRANSVAGINAL US OBSTETRIC: CPT | Performed by: OBSTETRICS & GYNECOLOGY

## 2022-10-19 PROCEDURE — 76816 OB US FOLLOW-UP PER FETUS: CPT | Performed by: OBSTETRICS & GYNECOLOGY

## 2022-11-07 ENCOUNTER — HOSPITAL ENCOUNTER (OUTPATIENT)
Age: 22
Setting detail: SPECIMEN
Discharge: HOME | End: 2022-11-07

## 2022-11-07 DIAGNOSIS — O99.810 ABNORMAL GLUCOSE TOLERANCE AFFECTING PREGNANCY, ANTEPARTUM: ICD-10-CM

## 2022-11-07 DIAGNOSIS — Z83.3 FAMILY HISTORY OF DIABETES MELLITUS IN MOTHER: ICD-10-CM

## 2022-11-07 DIAGNOSIS — Z14.8 GENETIC CARRIER STATUS: ICD-10-CM

## 2022-11-07 DIAGNOSIS — O09.299 HISTORY OF PRE-ECLAMPSIA IN PRIOR PREGNANCY, CURRENTLY PREGNANT: ICD-10-CM

## 2022-11-07 DIAGNOSIS — Z3A.26 26 WEEKS GESTATION OF PREGNANCY: ICD-10-CM

## 2022-11-07 DIAGNOSIS — Z67.91 RH NEGATIVE STATE IN ANTEPARTUM PERIOD: ICD-10-CM

## 2022-11-07 DIAGNOSIS — O26.899 RH NEGATIVE STATE IN ANTEPARTUM PERIOD: ICD-10-CM

## 2022-11-07 LAB
ABSOLUTE EOS #: 0.17 K/UL (ref 0–0.44)
ABSOLUTE IMMATURE GRANULOCYTE: 0.15 K/UL (ref 0–0.3)
ABSOLUTE LYMPH #: 1.89 K/UL (ref 1.1–3.7)
ABSOLUTE MONO #: 1.11 K/UL (ref 0.1–1.2)
AMOUNT GLUCOSE GIVEN: 100 G
BASOPHILS # BLD: 0 % (ref 0–2)
BASOPHILS ABSOLUTE: 0.06 K/UL (ref 0–0.2)
EOSINOPHILS RELATIVE PERCENT: 1 % (ref 1–4)
GLUCOSE FASTING: 82 MG/DL (ref 65–99)
GLUCOSE TOLERANCE TEST 1 HOUR: 152 MG/DL (ref 65–184)
GLUCOSE TOLERANCE TEST 2 HOUR: 137 MG/DL (ref 65–139)
GLUCOSE TOLERANCE TEST 3 HOUR: 88 MG/DL (ref 65–130)
HCT VFR BLD CALC: 34 % (ref 36.3–47.1)
HEMOGLOBIN: 10.8 G/DL (ref 11.9–15.1)
IMMATURE GRANULOCYTES: 1 %
LYMPHOCYTES # BLD: 13 % (ref 24–43)
MCH RBC QN AUTO: 26.1 PG (ref 25.2–33.5)
MCHC RBC AUTO-ENTMCNC: 31.8 G/DL (ref 28.4–34.8)
MCV RBC AUTO: 82.1 FL (ref 82.6–102.9)
MONOCYTES # BLD: 7 % (ref 3–12)
NRBC AUTOMATED: 0 PER 100 WBC
PDW BLD-RTO: 14 % (ref 11.8–14.4)
PLATELET # BLD: 230 K/UL (ref 138–453)
PMV BLD AUTO: 11.4 FL (ref 8.1–13.5)
RBC # BLD: 4.14 M/UL (ref 3.95–5.11)
RBC # BLD: ABNORMAL 10*6/UL
SEG NEUTROPHILS: 78 % (ref 36–65)
SEGMENTED NEUTROPHILS ABSOLUTE COUNT: 11.66 K/UL (ref 1.5–8.1)
WBC # BLD: 15 K/UL (ref 3.5–11.3)

## 2022-11-09 ENCOUNTER — ROUTINE PRENATAL (OUTPATIENT)
Dept: OBGYN CLINIC | Age: 22
End: 2022-11-09
Payer: COMMERCIAL

## 2022-11-09 VITALS
BODY MASS INDEX: 43.07 KG/M2 | SYSTOLIC BLOOD PRESSURE: 136 MMHG | HEART RATE: 94 BPM | WEIGHT: 275 LBS | DIASTOLIC BLOOD PRESSURE: 80 MMHG

## 2022-11-09 DIAGNOSIS — Z3A.29 29 WEEKS GESTATION OF PREGNANCY: Primary | ICD-10-CM

## 2022-11-09 DIAGNOSIS — Z67.91 RH NEGATIVE STATE IN ANTEPARTUM PERIOD: ICD-10-CM

## 2022-11-09 DIAGNOSIS — Z34.83 ENCOUNTER FOR SUPERVISION OF OTHER NORMAL PREGNANCY IN THIRD TRIMESTER: ICD-10-CM

## 2022-11-09 DIAGNOSIS — O26.899 RH NEGATIVE STATE IN ANTEPARTUM PERIOD: ICD-10-CM

## 2022-11-09 DIAGNOSIS — O99.810 ABNORMAL GLUCOSE TOLERANCE AFFECTING PREGNANCY, ANTEPARTUM: ICD-10-CM

## 2022-11-09 PROBLEM — Z34.90 SUPERVISION OF NORMAL PREGNANCY: Status: ACTIVE | Noted: 2022-11-09

## 2022-11-09 PROCEDURE — 90715 TDAP VACCINE 7 YRS/> IM: CPT | Performed by: STUDENT IN AN ORGANIZED HEALTH CARE EDUCATION/TRAINING PROGRAM

## 2022-11-09 PROCEDURE — 99213 OFFICE O/P EST LOW 20 MIN: CPT | Performed by: STUDENT IN AN ORGANIZED HEALTH CARE EDUCATION/TRAINING PROGRAM

## 2022-11-09 PROCEDURE — 90471 IMMUNIZATION ADMIN: CPT | Performed by: STUDENT IN AN ORGANIZED HEALTH CARE EDUCATION/TRAINING PROGRAM

## 2022-11-09 PROCEDURE — 96372 THER/PROPH/DIAG INJ SC/IM: CPT | Performed by: STUDENT IN AN ORGANIZED HEALTH CARE EDUCATION/TRAINING PROGRAM

## 2022-11-09 NOTE — PROGRESS NOTES
Prenatal Visit    Christos Duncan is a 23 y.o. female  at 34w10d    The patient was seen and evaluated. Reports positive fetal movements. She denies headache, vision changes, RUQ pain, contractions, vaginal bleeding and leakage of fluid. The patient requested the T-Dap Vaccine (27-36 weeks) this pregnancy. Flu shot at next visit. The problem list reflects the active issues addressed during today's visit    Vitals:    BP: 136/80  Weight: 275 lb (124.7 kg)  Heart Rate: 94  Patient Position: Sitting  Fundal Height (cm): 30 cm  Fetal HR: 152  Movement: Present  Presentation: Vertex     28 Week Labs: The patient is RH Neg, Rhogam given 22   ABO/Rh   Date Value Ref Range Status   2022 A NEGATIVE  Final       Glucose Fastin  1 hour Glucose Tolerance Test: 152  2 hour Glucose Tolerance Test: 137  3 hour Glucose Tolerance Test: 88    28 week CBC:   Lab Results   Component Value Date    WBC 15.0 (H) 2022    HGB 10.8 (L) 2022    HCT 34.0 (L) 2022    MCV 82.1 (L) 2022     2022     UA w/ Ur C&S: neg     Assessment & Plan:  Christos Duncan is a 23 y.o. female  at 34w10d   - Discussed signs or symptoms of when to call office   - Discussed fetal movement parameters  - 28 week labs completed   - discussed recommendations for TDAP immunization, patient requested TDAP. - Next [facility] appointment 22   - The ACIP recommended pregnant patients be included in phase 1C of vaccine distribution. This decision is supported by [facility] and ACOG. As of 2021, there have been over 30,000 pregnant patients included in the V-safe post COVID vaccination safety . Most (73%) reports to VAERS among pregnant women involved non-pregnancyspecific adverse events (e.g., local and systemic reactions).  Miscarriage was the most frequently reported pregnancy-specific adverse event to VAERS; numbers are within the known background rates based on presumed COVID-19 vaccine doses administered to pregnant women. No unexpected pregnancy or infant outcomes have been observed related to  COVID-19 vaccination during pregnancy. Recommended patient proceed with vaccination. Booster due. Patient Active Problem List    Diagnosis Date Noted    Supervision of normal pregnancy 2022     Priority: Medium    Elevated Early 1 hr, normal 3 hr GTT. 2022     Priority: Medium     Repeat 3 hr WNL      Hx Pre-E (G1) 2022     Priority: Medium     Baby ASA @ 12 wks      Tachycardia 2021     And possible presyncopal episode, suspect POTS  Baseline EKG wnl 21      SMA carrier status 2021     SMN1+ carrier  Negative FOB testing      History of heavy periods 2021     OCPs in the past caused worsening acne and weight gain      Rh negative state 2021             Fam H/O DM (pt's mom) 2020     Early 1h       BMI 41 2020      testing 34 weeks      H/O headaches 2020     Usually treats with advil outside of pregnancy       Return in about 2 weeks (around 2022) for DO Ashley Jones Ob/Gyn   2022, 10:53 AM

## 2022-11-09 NOTE — PROGRESS NOTES
After obtaining consent, and per orders of Dr. Efrem Ellison, injection of Tdap 0.5mL given in Left deltoid IM by Jeanette Garvey. Patient instructed to remain in clinic for 20 minutes afterwards, and to report any adverse reaction to me immediately. After obtaining consent, and per orders of Dr. Efrem Ellison, injection of RhoGam 1500 IU given in Left deltoid IM by Jeanette Garvey. Patient instructed to remain in clinic for 20 minutes afterwards, and to report any adverse reaction to me immediately.

## 2022-11-23 ENCOUNTER — ROUTINE PRENATAL (OUTPATIENT)
Dept: OBGYN CLINIC | Age: 22
End: 2022-11-23

## 2022-11-23 VITALS
DIASTOLIC BLOOD PRESSURE: 83 MMHG | WEIGHT: 276 LBS | BODY MASS INDEX: 43.23 KG/M2 | HEART RATE: 87 BPM | SYSTOLIC BLOOD PRESSURE: 122 MMHG

## 2022-11-23 DIAGNOSIS — Z34.83 ENCOUNTER FOR SUPERVISION OF OTHER NORMAL PREGNANCY IN THIRD TRIMESTER: ICD-10-CM

## 2022-11-23 DIAGNOSIS — Z3A.31 31 WEEKS GESTATION OF PREGNANCY: Primary | ICD-10-CM

## 2022-11-23 PROCEDURE — 0502F SUBSEQUENT PRENATAL CARE: CPT | Performed by: OBSTETRICS & GYNECOLOGY

## 2022-11-30 ENCOUNTER — ROUTINE PRENATAL (OUTPATIENT)
Dept: PERINATAL CARE | Age: 22
End: 2022-11-30
Payer: COMMERCIAL

## 2022-11-30 VITALS
HEIGHT: 67 IN | BODY MASS INDEX: 43.47 KG/M2 | TEMPERATURE: 97.5 F | RESPIRATION RATE: 16 BRPM | WEIGHT: 277 LBS | DIASTOLIC BLOOD PRESSURE: 76 MMHG | SYSTOLIC BLOOD PRESSURE: 127 MMHG | HEART RATE: 89 BPM

## 2022-11-30 DIAGNOSIS — O99.213 OBESITY AFFECTING PREGNANCY IN THIRD TRIMESTER: ICD-10-CM

## 2022-11-30 DIAGNOSIS — Z36.4 ULTRASOUND FOR ANTENATAL SCREENING FOR FETAL GROWTH RESTRICTION: ICD-10-CM

## 2022-11-30 DIAGNOSIS — O28.5 ABNORMAL GENETIC TEST DURING PREGNANCY: ICD-10-CM

## 2022-11-30 DIAGNOSIS — Z03.73 SUSPECTED FETAL ANOMALY NOT FOUND: ICD-10-CM

## 2022-11-30 DIAGNOSIS — O35.07X0 FETAL MICROCEPHALY AFFECTING ANTEPARTUM CARE OF MOTHER, SINGLE OR UNSPECIFIED FETUS: Primary | ICD-10-CM

## 2022-11-30 DIAGNOSIS — O09.899 SHORT INTERVAL BETWEEN PREGNANCIES COMPLICATING PREGNANCY, ANTEPARTUM: ICD-10-CM

## 2022-11-30 DIAGNOSIS — Z13.89 ENCOUNTER FOR ROUTINE SCREENING FOR MALFORMATION USING ULTRASONICS: ICD-10-CM

## 2022-11-30 DIAGNOSIS — Z3A.32 32 WEEKS GESTATION OF PREGNANCY: ICD-10-CM

## 2022-11-30 DIAGNOSIS — O09.293 HISTORY OF PRE-ECLAMPSIA IN PRIOR PREGNANCY, CURRENTLY PREGNANT, THIRD TRIMESTER: ICD-10-CM

## 2022-11-30 DIAGNOSIS — O99.810 ABNORMAL MATERNAL GLUCOSE TOLERANCE, ANTEPARTUM: ICD-10-CM

## 2022-11-30 LAB
ABDOMINAL CIRCUMFERENCE: NORMAL
BIPARIETAL DIAMETER: NORMAL
ESTIMATED FETAL WEIGHT: NORMAL
FEMORAL DIAMETER: NORMAL
HC/AC: NORMAL
HEAD CIRCUMFERENCE: NORMAL

## 2022-11-30 PROCEDURE — 76805 OB US >/= 14 WKS SNGL FETUS: CPT | Performed by: OBSTETRICS & GYNECOLOGY

## 2022-11-30 PROCEDURE — 76819 FETAL BIOPHYS PROFIL W/O NST: CPT | Performed by: OBSTETRICS & GYNECOLOGY

## 2022-11-30 PROCEDURE — 99999 PR OFFICE/OUTPT VISIT,PROCEDURE ONLY: CPT | Performed by: OBSTETRICS & GYNECOLOGY

## 2022-12-08 NOTE — PROGRESS NOTES
Cherylene Slate is a  @ 34w0d who presents for DEJAN visit. She denies LOF, VB or Ctxs.  + FM. She denies any complaints. She denies any fevers/chills, SOB, cough, sore throat, loss of taste/smell or sick contacts. Pt denies any HA, vision changes or RUQ pain. O:  Vitals:    22 1111   BP: 132/82   Pulse: 100     Gen: NAD  Abd: soft, nontender, gravid  Ext:  no edema    NST: 135, mod variability, accels, no decels. Category 1 FHTs, reactive. BP: 132/82  Weight: 279 lb (126.6 kg)  Heart Rate: 100  Patient Position: Sitting  Fetal HR: rnst  Movement: Present  Presentation: Vertex    A/P:  Patient Active Problem List    Diagnosis Date Noted    Supervision of normal pregnancy 2022     Priority: Medium    Hx Pre-E (G1) 2022     Priority: Medium     Baby ASA @ 12 wks      Elevated Early 1 hr, normal 3 hr GTT. 2022     Repeat 3 hr WNL      Tachycardia 2021     And possible presyncopal episode, suspect POTS  Baseline EKG wnl 21      SMA carrier status 2021     SMN1+ carrier  Negative FOB testing      History of heavy periods 2021     OCPs in the past caused worsening acne and weight gain      Rh negative state 2021             Fam H/O DM (pt's mom) 2020     Early 1h       BMI 41 2020      testing 34 weeks      H/O headaches 2020     Usually treats with advil outside of pregnancy       Discussed updated COVID precautions and policies. Reviewed updated visitor policy. Encouraged social distancing and appropriate hand washing/hygiene practices. Reviewed symptoms suspicious for COVID infection. Discussed that ACOG, SMFM, and the CDC recommend to not withold immunization in pregnant and breastfeeding women who meet criteria for receipt of the vaccine based on the ACIP recommended priority groups. All questions answered. Patient vocalized understanding.     Discussed s/sx that should prompt call to the office  Discussed kick counts  RTC in 1 wks    Lynnette Murray MD

## 2022-12-09 ENCOUNTER — ROUTINE PRENATAL (OUTPATIENT)
Dept: OBGYN CLINIC | Age: 22
End: 2022-12-09

## 2022-12-09 VITALS
SYSTOLIC BLOOD PRESSURE: 132 MMHG | DIASTOLIC BLOOD PRESSURE: 82 MMHG | HEART RATE: 100 BPM | WEIGHT: 279 LBS | BODY MASS INDEX: 43.7 KG/M2

## 2022-12-09 DIAGNOSIS — Z3A.34 34 WEEKS GESTATION OF PREGNANCY: Primary | ICD-10-CM

## 2022-12-09 DIAGNOSIS — Z34.83 ENCOUNTER FOR SUPERVISION OF OTHER NORMAL PREGNANCY IN THIRD TRIMESTER: ICD-10-CM

## 2022-12-15 NOTE — PROGRESS NOTES
Olesya Walker is a  @ 35w0d who presents for DEJAN visit. She denies LOF, VB or Ctxs.  + FM. She denies any complaints. She denies any fevers/chills, SOB, cough, sore throat, loss of taste/smell or sick contacts. Pt denies any HA, vision changes or RUQ pain. O:  Vitals:    22 1059   BP: 129/81   Pulse: (!) 108     Gen: NAD  Abd: soft, nontender, gravid  Ext:  no edema    NST: 145, mod variability, accels, no decels. Category 1 FHTs, reactive. BP: 129/81  Weight: 282 lb (127.9 kg)  Heart Rate: (!) 108  Patient Position: Sitting  Fetal HR: rnst  Movement: Present  Presentation: Vertex    A/P:  Patient Active Problem List    Diagnosis Date Noted    Supervision of normal pregnancy 2022     Priority: Medium    Hx Pre-E (G1) 2022     Priority: Medium     Baby ASA @ 12 wks      Elevated Early 1 hr, normal 3 hr GTT. 2022     Repeat 3 hr WNL      Tachycardia 2021     And possible presyncopal episode, suspect POTS  Baseline EKG wnl 21      SMA carrier status 2021     SMN1+ carrier  Negative FOB testing      History of heavy periods 2021     OCPs in the past caused worsening acne and weight gain      Rh negative state 2021             Fam H/O DM (pt's mom) 2020     Early 1h       BMI 41 2020      testing 34 weeks      H/O headaches 2020     Usually treats with advil outside of pregnancy       Discussed updated COVID precautions and policies. Reviewed updated visitor policy. Encouraged social distancing and appropriate hand washing/hygiene practices. Reviewed symptoms suspicious for COVID infection. Discussed that ACOG, SMFM, and the CDC recommend to not withold immunization in pregnant and breastfeeding women who meet criteria for receipt of the vaccine based on the ACIP recommended priority groups. All questions answered. Patient vocalized understanding.     GBS next visit  Discussed s/sx that should prompt call to the office  Discussed kick counts  RTC in 1 wks    Hawa President, MD

## 2022-12-16 ENCOUNTER — ROUTINE PRENATAL (OUTPATIENT)
Dept: OBGYN CLINIC | Age: 22
End: 2022-12-16

## 2022-12-16 VITALS
HEART RATE: 108 BPM | SYSTOLIC BLOOD PRESSURE: 129 MMHG | WEIGHT: 282 LBS | BODY MASS INDEX: 44.17 KG/M2 | DIASTOLIC BLOOD PRESSURE: 81 MMHG

## 2022-12-22 ENCOUNTER — ROUTINE PRENATAL (OUTPATIENT)
Dept: OBGYN CLINIC | Age: 22
End: 2022-12-22

## 2022-12-22 ENCOUNTER — HOSPITAL ENCOUNTER (OUTPATIENT)
Age: 22
Setting detail: SPECIMEN
Discharge: HOME | End: 2022-12-22

## 2022-12-22 VITALS
HEART RATE: 87 BPM | BODY MASS INDEX: 44.48 KG/M2 | SYSTOLIC BLOOD PRESSURE: 103 MMHG | DIASTOLIC BLOOD PRESSURE: 67 MMHG | WEIGHT: 284 LBS

## 2022-12-22 DIAGNOSIS — Z3A.35 35 WEEKS GESTATION OF PREGNANCY: ICD-10-CM

## 2022-12-22 NOTE — PROGRESS NOTES
Prenatal Visit    Manning Sandhoff is a 23 y.o. female  at 26w5d    The patient was seen and evaluated. She is complaining of nothing, doing well. She reports positive fetal movements. She denies contractions, vaginal bleeding and leakage of fluid. Signs and symptoms of labor and pre-eclampsia were reviewed with the patient in detail. She is to report any of these if they occur. She currently denies any signs or symptoms of pre-clampsia including headache, vision changes, RUQ pain. The problem list reflects the active issues addressed during today's visit. Allergies: Allergies   Allergen Reactions    Bee Venom Swelling       Vitals:  BP: 103/67  Weight: 284 lb (128.8 kg)  Heart Rate: 87  Patient Position: Sitting   NST: 130 ,mod variability, pos accels, absent decels, reactive and category I    Labs:  Group Beta Strep collection was collected today.      Assessment & Plan:  Manning Sandhoff is a 23 y.o. female  at 26w5d   - GBS testing was ordered, sensitivities for clindamycin and erythromycin were not ordered  -  testing indication: BMI- scheduled for NST next week   - DEJAN in 1 week      Patient Active Problem List    Diagnosis Date Noted    Supervision of normal pregnancy 2022     Priority: Medium    Hx Pre-E (G1) 2022     Priority: Medium     Baby ASA @ 12 wks      Elevated Early 1 hr, normal 3 hr GTT. 2022     Repeat 3 hr WNL      Tachycardia 2021     And possible presyncopal episode, suspect POTS  Baseline EKG wnl 21      SMA carrier status 2021     SMN1+ carrier  Negative FOB testing      History of heavy periods 2021     OCPs in the past caused worsening acne and weight gain      Rh negative state 2021             Fam H/O DM (pt's mom) 2020     Early 1h       BMI 41 2020      testing 34 weeks      H/O headaches 2020     Usually treats with advil outside of pregnancy           CITLALY [address], DO  Ob/Gyn   12/22/2022, 2:15 PM

## 2022-12-25 LAB
CULTURE: NORMAL
SPECIMEN DESCRIPTION: NORMAL

## 2022-12-29 NOTE — PROGRESS NOTES
Angie Lopez is a  @ 37w0d who presents for DEJAN visit. She denies LOF, VB or Ctxs.  + FM. She denies any complaints, but says that she is just getting uncomfortable with the end of pregnancy. She denies any fevers/chills, SOB, cough, sore throat, loss of taste/smell or sick contacts. Pt denies any HA, vision changes or RUQ pain. O:  Vitals:    22 0917   BP: 130/81   Pulse: 96     Gen: NAD  Abd: soft, nontender, gravid  Ext:  mild edema    NST: 145, mod variability, accels, no decels. Category 1 FHTs, reactive. BP: 130/81  Weight: 289 lb 6.4 oz (131.3 kg)  Heart Rate: 96  Patient Position: Sitting  Movement: Present    A/P:  Patient Active Problem List    Diagnosis Date Noted    Supervision of normal pregnancy 2022     Priority: Medium    Hx Pre-E (G1) 2022     Priority: Medium     Baby ASA @ 12 wks      Elevated Early 1 hr, normal 3 hr GTT. 2022     Repeat 3 hr WNL      Tachycardia 2021     And possible presyncopal episode, suspect POTS  Baseline EKG wnl 21      SMA carrier status 2021     SMN1+ carrier  Negative FOB testing      History of heavy periods 2021     OCPs in the past caused worsening acne and weight gain      Rh negative state 2021             Fam H/O DM (pt's mom) 2020     Early 1h       BMI 41 2020      testing 34 weeks      H/O headaches 2020     Usually treats with advil outside of pregnancy       Discussed updated COVID precautions and policies. Reviewed updated visitor policy. Encouraged social distancing and appropriate hand washing/hygiene practices. Reviewed symptoms suspicious for COVID infection. Discussed that ACOG, SMFM, and the CDC recommend to not withold immunization in pregnant and breastfeeding women who meet criteria for receipt of the vaccine based on the ACIP recommended priority groups. All questions answered. Patient vocalized understanding.     Discussed s/sx that should prompt call to the office  Discussed kick counts  RTC in 1 wks    Aneudy Yancey MD

## 2022-12-30 ENCOUNTER — ROUTINE PRENATAL (OUTPATIENT)
Dept: OBGYN CLINIC | Age: 22
End: 2022-12-30
Payer: COMMERCIAL

## 2022-12-30 VITALS
WEIGHT: 289.4 LBS | SYSTOLIC BLOOD PRESSURE: 130 MMHG | HEART RATE: 96 BPM | DIASTOLIC BLOOD PRESSURE: 81 MMHG | BODY MASS INDEX: 45.33 KG/M2

## 2022-12-30 DIAGNOSIS — Z3A.37 37 WEEKS GESTATION OF PREGNANCY: ICD-10-CM

## 2022-12-30 DIAGNOSIS — Z34.83 ENCOUNTER FOR SUPERVISION OF OTHER NORMAL PREGNANCY IN THIRD TRIMESTER: ICD-10-CM

## 2022-12-30 PROCEDURE — 59025 FETAL NON-STRESS TEST: CPT | Performed by: OBSTETRICS & GYNECOLOGY

## 2022-12-30 PROCEDURE — 0502F SUBSEQUENT PRENATAL CARE: CPT | Performed by: OBSTETRICS & GYNECOLOGY

## 2023-01-05 NOTE — PROGRESS NOTES
Katlin Morris is a  @ 38w0d who presents for DEJAN visit. She denies LOF, VB or Ctxs.  + FM. She denies any complaints. She denies any fevers/chills, SOB, cough, sore throat, loss of taste/smell or sick contacts. Pt denies any HA, vision changes or RUQ pain. O:  Vitals:    23 1103   BP: 124/84   Pulse: (!) 101     Gen: NAD  Abd: soft, nontender, gravid  Ext:  no edema    NST: 140, mod variability, accels, no decels. Category 1 FHTs, reactive. BP: 124/84  Weight: 288 lb (130.6 kg)  Heart Rate: (!) 101  Patient Position: Sitting  Movement: Present    A/P:  Patient Active Problem List    Diagnosis Date Noted    Supervision of normal pregnancy 2022     Priority: Medium    Hx Pre-E (G1) 2022     Priority: Medium     Baby ASA @ 12 wks      Elevated Early 1 hr, normal 3 hr GTT. 2022     Repeat 3 hr WNL      Tachycardia 2021     And possible presyncopal episode, suspect POTS  Baseline EKG wnl 21      SMA carrier status 2021     SMN1+ carrier  Negative FOB testing      History of heavy periods 2021     OCPs in the past caused worsening acne and weight gain      Rh negative state 2021             Fam H/O DM (pt's mom) 2020     Early 1h       BMI 41 2020      testing 34 weeks      H/O headaches 2020     Usually treats with advil outside of pregnancy       Discussed updated COVID precautions and policies. Reviewed updated visitor policy. Encouraged social distancing and appropriate hand washing/hygiene practices. Reviewed symptoms suspicious for COVID infection. Discussed that ACOG, SMFM, and the CDC recommend to not withold immunization in pregnant and breastfeeding women who meet criteria for receipt of the vaccine based on the ACIP recommended priority groups. All questions answered. Patient vocalized understanding.     IOL 1/15  Discussed s/sx that should prompt call to the office  Discussed kick counts  RTC in 1 wks    Fabien Handler Marlo Neal MD

## 2023-01-06 ENCOUNTER — ROUTINE PRENATAL (OUTPATIENT)
Dept: OBGYN CLINIC | Age: 23
End: 2023-01-06

## 2023-01-06 VITALS
WEIGHT: 288 LBS | DIASTOLIC BLOOD PRESSURE: 84 MMHG | SYSTOLIC BLOOD PRESSURE: 124 MMHG | BODY MASS INDEX: 45.11 KG/M2 | HEART RATE: 101 BPM

## 2023-01-06 DIAGNOSIS — Z3A.38 38 WEEKS GESTATION OF PREGNANCY: Primary | ICD-10-CM

## 2023-01-06 DIAGNOSIS — Z34.83 ENCOUNTER FOR SUPERVISION OF OTHER NORMAL PREGNANCY IN THIRD TRIMESTER: ICD-10-CM

## 2023-01-11 ENCOUNTER — HOSPITAL ENCOUNTER (OUTPATIENT)
Age: 23
Setting detail: SPECIMEN
Discharge: HOME | End: 2023-01-11

## 2023-01-11 ENCOUNTER — ROUTINE PRENATAL (OUTPATIENT)
Dept: OBGYN CLINIC | Age: 23
End: 2023-01-11
Payer: COMMERCIAL

## 2023-01-11 VITALS
WEIGHT: 291.6 LBS | SYSTOLIC BLOOD PRESSURE: 130 MMHG | BODY MASS INDEX: 45.67 KG/M2 | DIASTOLIC BLOOD PRESSURE: 82 MMHG | HEART RATE: 103 BPM

## 2023-01-11 DIAGNOSIS — Z34.83 ENCOUNTER FOR SUPERVISION OF OTHER NORMAL PREGNANCY IN THIRD TRIMESTER: ICD-10-CM

## 2023-01-11 DIAGNOSIS — R03.0 ELEVATED BP WITHOUT DIAGNOSIS OF HYPERTENSION: ICD-10-CM

## 2023-01-11 DIAGNOSIS — Z14.8 GENETIC CARRIER STATUS: ICD-10-CM

## 2023-01-11 DIAGNOSIS — O99.810 ABNORMAL GLUCOSE TOLERANCE AFFECTING PREGNANCY, ANTEPARTUM: ICD-10-CM

## 2023-01-11 DIAGNOSIS — Z3A.38 38 WEEKS GESTATION OF PREGNANCY: Primary | ICD-10-CM

## 2023-01-11 DIAGNOSIS — O09.299 HISTORY OF PRE-ECLAMPSIA IN PRIOR PREGNANCY, CURRENTLY PREGNANT: ICD-10-CM

## 2023-01-11 LAB
ABSOLUTE EOS #: 0.15 K/UL (ref 0–0.44)
ABSOLUTE IMMATURE GRANULOCYTE: 0.38 K/UL (ref 0–0.3)
ABSOLUTE LYMPH #: 1.85 K/UL (ref 1.1–3.7)
ABSOLUTE MONO #: 1.32 K/UL (ref 0.1–1.2)
ALBUMIN SERPL-MCNC: 3 G/DL (ref 3.5–5.2)
ALBUMIN/GLOBULIN RATIO: 1 (ref 1–2.5)
ALP BLD-CCNC: 167 U/L (ref 35–104)
ALT SERPL-CCNC: 11 U/L (ref 5–33)
ANION GAP SERPL CALCULATED.3IONS-SCNC: 10 MMOL/L (ref 9–17)
AST SERPL-CCNC: 13 U/L
BASOPHILS # BLD: 0 % (ref 0–2)
BASOPHILS ABSOLUTE: 0.04 K/UL (ref 0–0.2)
BILIRUB SERPL-MCNC: 0.2 MG/DL (ref 0.3–1.2)
BUN BLDV-MCNC: 10 MG/DL (ref 6–20)
CALCIUM SERPL-MCNC: 8.9 MG/DL (ref 8.6–10.4)
CHLORIDE BLD-SCNC: 108 MMOL/L (ref 98–107)
CO2: 22 MMOL/L (ref 20–31)
CREAT SERPL-MCNC: 0.54 MG/DL (ref 0.5–0.9)
EOSINOPHILS RELATIVE PERCENT: 1 % (ref 1–4)
GFR SERPL CREATININE-BSD FRML MDRD: >60 ML/MIN/1.73M2
GLUCOSE BLD-MCNC: 85 MG/DL (ref 70–99)
HCT VFR BLD CALC: 33.9 % (ref 36.3–47.1)
HEMOGLOBIN: 10.1 G/DL (ref 11.9–15.1)
IMMATURE GRANULOCYTES: 2 %
LYMPHOCYTES # BLD: 12 % (ref 24–43)
MCH RBC QN AUTO: 23.4 PG (ref 25.2–33.5)
MCHC RBC AUTO-ENTMCNC: 29.8 G/DL (ref 28.4–34.8)
MCV RBC AUTO: 78.7 FL (ref 82.6–102.9)
MONOCYTES # BLD: 8 % (ref 3–12)
NRBC AUTOMATED: 0.1 PER 100 WBC
PDW BLD-RTO: 15.4 % (ref 11.8–14.4)
PLATELET # BLD: 248 K/UL (ref 138–453)
PMV BLD AUTO: 11.3 FL (ref 8.1–13.5)
POTASSIUM SERPL-SCNC: 4.6 MMOL/L (ref 3.7–5.3)
RBC # BLD: 4.31 M/UL (ref 3.95–5.11)
RBC # BLD: ABNORMAL 10*6/UL
SEG NEUTROPHILS: 77 % (ref 36–65)
SEGMENTED NEUTROPHILS ABSOLUTE COUNT: 12.11 K/UL (ref 1.5–8.1)
SODIUM BLD-SCNC: 140 MMOL/L (ref 135–144)
TOTAL PROTEIN: 6.1 G/DL (ref 6.4–8.3)
WBC # BLD: 15.9 K/UL (ref 3.5–11.3)

## 2023-01-11 PROCEDURE — 59025 FETAL NON-STRESS TEST: CPT | Performed by: STUDENT IN AN ORGANIZED HEALTH CARE EDUCATION/TRAINING PROGRAM

## 2023-01-11 NOTE — PROGRESS NOTES
Prenatal Visit    Irene Oh is a 23 y.o. female  at 44w7d    The patient was seen and evaluated. Reports positive fetal movements. She denies headache, vision changes, RUQ pain, contractions, vaginal bleeding and leakage of fluid. The patient already received the T-Dap Vaccine (27-36 weeks) this pregnancy. The problem list reflects the active issues addressed during today's visit. Allergies:  Bee venom    Vitals:    BP: 130/82  Weight: 291 lb 9.6 oz (132.3 kg)  Heart Rate: (!) 103  Fetal HR: RNST  Movement: Present     Vitals:    23 0950 23 1016   BP: (!) 146/75 130/82   Site: Right Lower Arm Left Lower Arm   Position: Sitting Sitting   Cuff Size: Large Adult Large Adult   Pulse: (!) 125 (!) 103   Weight: 291 lb 9.6 oz (132.3 kg)          NST:   Fetal heart rate baseline: 140, moderate variability, accelerations present, decelerations absent    The tracing has been reviewed and is considered reactive. Physical Exam:  SVE: N/a    Labs:  Group Beta Strep collection was done. Sensitivities for clindamycin and erythromycin were not ordered. The patient was found to be GBS: negative    Assessment & Plan:  Irene Oh is a 23 y.o. female  at 44w7d   - Discussed signs or symptoms of when to call office   - Discussed fetal movement parameters   - Desires RR IOL 1/15/23 @    - The ACIP recommended pregnant patients be included in phase 1C of vaccine distribution. This decision is supported by [facility] and ACOG. As of 2021, there have been over 30,000 pregnant patients included in the V-safe post COVID vaccination safety . Most (73%) reports to VAERS among pregnant women involved non-pregnancyspecific adverse events (e.g., local and systemic reactions).  Miscarriage was the most frequently reported pregnancy-specific adverse event to VAERS; numbers are within the known background rates based on presumed COVID-19 vaccine doses administered to pregnant women. No unexpected pregnancy or infant outcomes have been observed related to  COVID-19 vaccination during pregnancy. Recommended patient proceed with vaccination.    -Elevated blood pressure today with repeat normotensive. Denies  headache, vision changes, nausea, vomiting, chest pain, shortness of breath, RUQ pain, or increase in swelling. Pre-E labs ordered. Patient Active Problem List    Diagnosis Date Noted    Elevated BP without diagnosis of hypertension 2023     Priority: Medium     146/75 & 130/82 on 23   Pre-E labs ordered      Supervision of normal pregnancy 2022     Priority: Medium    Elevated Early 1 hr, normal 3 hr GTT. 2022     Priority: Medium     Repeat 3 hr WNL      Hx Pre-E (G1) 2022     Priority: Medium     Baby ASA @ 12 wks      Tachycardia 2021     And possible presyncopal episode, suspect POTS  Baseline EKG wnl 21      SMA carrier status 2021     SMN1+ carrier  Negative FOB testing      History of heavy periods 2021     OCPs in the past caused worsening acne and weight gain      Rh negative state 2021             Fam H/O DM (pt's mom) 2020     Early 1h       BMI 45 2020      testing 34 weeks      H/O headaches 2020     Usually treats with advil outside of pregnancy       Return in about 2 weeks (around 2023) for PP Visit.     DO Ashley Cunningham Ob/Gyn   2023, 10:20 AM

## 2023-01-12 ENCOUNTER — OFFICE VISIT (OUTPATIENT)
Dept: OBGYN CLINIC | Age: 23
End: 2023-01-12

## 2023-01-12 VITALS — HEART RATE: 137 BPM | DIASTOLIC BLOOD PRESSURE: 75 MMHG | SYSTOLIC BLOOD PRESSURE: 128 MMHG

## 2023-01-12 DIAGNOSIS — Z01.30 BP CHECK: ICD-10-CM

## 2023-01-12 DIAGNOSIS — Z3A.38 38 WEEKS GESTATION OF PREGNANCY: Primary | ICD-10-CM

## 2023-01-12 NOTE — PROGRESS NOTES
Vitals:    01/12/23 0931   BP: 128/75   Site: Left Lower Arm   Position: Sitting   Cuff Size: Large Adult   Pulse: (!) [facility], DO Ramirez Ob/Gyn   1/12/2023, 9:34 AM

## 2023-01-13 DIAGNOSIS — R03.0 ELEVATED BP WITHOUT DIAGNOSIS OF HYPERTENSION: ICD-10-CM

## 2023-01-15 ENCOUNTER — HOSPITAL ENCOUNTER (INPATIENT)
Age: 23
LOS: 2 days | Discharge: HOME | End: 2023-01-17
Attending: OBSTETRICS & GYNECOLOGY | Admitting: OBSTETRICS & GYNECOLOGY
Payer: COMMERCIAL

## 2023-01-15 ENCOUNTER — APPOINTMENT (OUTPATIENT)
Dept: LABOR AND DELIVERY | Age: 23
End: 2023-01-15
Payer: COMMERCIAL

## 2023-01-15 PROBLEM — Z34.90 SUPERVISION OF NORMAL PREGNANCY: Status: RESOLVED | Noted: 2022-11-09 | Resolved: 2023-01-15

## 2023-01-15 PROBLEM — O09.93 HIGH-RISK PREGNANCY IN THIRD TRIMESTER: Status: ACTIVE | Noted: 2023-01-15

## 2023-01-15 LAB
ABO/RH: NORMAL
ABSOLUTE EOS #: 0.15 K/UL (ref 0–0.44)
ABSOLUTE IMMATURE GRANULOCYTE: 0.36 K/UL (ref 0–0.3)
ABSOLUTE LYMPH #: 2.45 K/UL (ref 1.1–3.7)
ABSOLUTE MONO #: 1.38 K/UL (ref 0.1–1.2)
ANTIBODY IDENTIFICATION: NORMAL
ANTIBODY SCREEN: POSITIVE
ARM BAND NUMBER: NORMAL
BASOPHILS # BLD: 0 % (ref 0–2)
BASOPHILS ABSOLUTE: 0.05 K/UL (ref 0–0.2)
EOSINOPHILS RELATIVE PERCENT: 1 % (ref 1–4)
EXPIRATION DATE: NORMAL
HCT VFR BLD CALC: 31.5 % (ref 36.3–47.1)
HEMOGLOBIN: 9.7 G/DL (ref 11.9–15.1)
IMMATURE GRANULOCYTES: 2 %
LYMPHOCYTES # BLD: 14 % (ref 24–43)
MCH RBC QN AUTO: 23.2 PG (ref 25.2–33.5)
MCHC RBC AUTO-ENTMCNC: 30.8 G/DL (ref 28.4–34.8)
MCV RBC AUTO: 75.4 FL (ref 82.6–102.9)
MONOCYTES # BLD: 8 % (ref 3–12)
NRBC AUTOMATED: 0 PER 100 WBC
PDW BLD-RTO: 15.5 % (ref 11.8–14.4)
PLATELET # BLD: 254 K/UL (ref 138–453)
PMV BLD AUTO: 11 FL (ref 8.1–13.5)
RBC # BLD: 4.18 M/UL (ref 3.95–5.11)
RBC # BLD: ABNORMAL 10*6/UL
SEG NEUTROPHILS: 75 % (ref 36–65)
SEGMENTED NEUTROPHILS ABSOLUTE COUNT: 13.74 K/UL (ref 1.5–8.1)
T. PALLIDUM, IGG: NONREACTIVE
WBC # BLD: 18.1 K/UL (ref 3.5–11.3)

## 2023-01-15 PROCEDURE — 6370000000 HC RX 637 (ALT 250 FOR IP)

## 2023-01-15 PROCEDURE — 1220000000 HC SEMI PRIVATE OB R&B

## 2023-01-15 PROCEDURE — 86870 RBC ANTIBODY IDENTIFICATION: CPT

## 2023-01-15 PROCEDURE — 86850 RBC ANTIBODY SCREEN: CPT

## 2023-01-15 PROCEDURE — 86901 BLOOD TYPING SEROLOGIC RH(D): CPT

## 2023-01-15 PROCEDURE — 86780 TREPONEMA PALLIDUM: CPT

## 2023-01-15 PROCEDURE — 2580000003 HC RX 258: Performed by: STUDENT IN AN ORGANIZED HEALTH CARE EDUCATION/TRAINING PROGRAM

## 2023-01-15 PROCEDURE — 86900 BLOOD TYPING SEROLOGIC ABO: CPT

## 2023-01-15 PROCEDURE — 85025 COMPLETE CBC W/AUTO DIFF WBC: CPT

## 2023-01-15 PROCEDURE — 80053 COMPREHEN METABOLIC PANEL: CPT

## 2023-01-15 RX ORDER — SODIUM CHLORIDE, SODIUM LACTATE, POTASSIUM CHLORIDE, CALCIUM CHLORIDE 600; 310; 30; 20 MG/100ML; MG/100ML; MG/100ML; MG/100ML
INJECTION, SOLUTION INTRAVENOUS CONTINUOUS
Status: DISCONTINUED | OUTPATIENT
Start: 2023-01-15 | End: 2023-01-17

## 2023-01-15 RX ORDER — SODIUM CHLORIDE 0.9 % (FLUSH) 0.9 %
5-40 SYRINGE (ML) INJECTION PRN
Status: DISCONTINUED | OUTPATIENT
Start: 2023-01-15 | End: 2023-01-17 | Stop reason: HOSPADM

## 2023-01-15 RX ORDER — ONDANSETRON 2 MG/ML
4 INJECTION INTRAMUSCULAR; INTRAVENOUS EVERY 6 HOURS PRN
Status: DISCONTINUED | OUTPATIENT
Start: 2023-01-15 | End: 2023-01-16 | Stop reason: SDUPTHER

## 2023-01-15 RX ORDER — SODIUM CHLORIDE 9 MG/ML
25 INJECTION, SOLUTION INTRAVENOUS PRN
Status: DISCONTINUED | OUTPATIENT
Start: 2023-01-15 | End: 2023-01-17 | Stop reason: HOSPADM

## 2023-01-15 RX ORDER — SODIUM CHLORIDE, SODIUM LACTATE, POTASSIUM CHLORIDE, AND CALCIUM CHLORIDE .6; .31; .03; .02 G/100ML; G/100ML; G/100ML; G/100ML
500 INJECTION, SOLUTION INTRAVENOUS PRN
Status: DISCONTINUED | OUTPATIENT
Start: 2023-01-15 | End: 2023-01-17 | Stop reason: HOSPADM

## 2023-01-15 RX ORDER — SODIUM CHLORIDE 0.9 % (FLUSH) 0.9 %
5-40 SYRINGE (ML) INJECTION EVERY 12 HOURS SCHEDULED
Status: DISCONTINUED | OUTPATIENT
Start: 2023-01-15 | End: 2023-01-17 | Stop reason: HOSPADM

## 2023-01-15 RX ORDER — ACETAMINOPHEN 500 MG
1000 TABLET ORAL EVERY 6 HOURS PRN
Status: DISCONTINUED | OUTPATIENT
Start: 2023-01-15 | End: 2023-01-17 | Stop reason: HOSPADM

## 2023-01-15 RX ORDER — SODIUM CHLORIDE, SODIUM LACTATE, POTASSIUM CHLORIDE, AND CALCIUM CHLORIDE .6; .31; .03; .02 G/100ML; G/100ML; G/100ML; G/100ML
1000 INJECTION, SOLUTION INTRAVENOUS PRN
Status: DISCONTINUED | OUTPATIENT
Start: 2023-01-15 | End: 2023-01-17 | Stop reason: HOSPADM

## 2023-01-15 RX ADMIN — SODIUM CHLORIDE, POTASSIUM CHLORIDE, SODIUM LACTATE AND CALCIUM CHLORIDE: 600; 310; 30; 20 INJECTION, SOLUTION INTRAVENOUS at 20:59

## 2023-01-15 RX ADMIN — Medication 25 MCG: at 21:27

## 2023-01-16 ENCOUNTER — ANESTHESIA (OUTPATIENT)
Dept: LABOR AND DELIVERY | Age: 23
End: 2023-01-16
Payer: COMMERCIAL

## 2023-01-16 ENCOUNTER — ANESTHESIA EVENT (OUTPATIENT)
Dept: LABOR AND DELIVERY | Age: 23
End: 2023-01-16
Payer: COMMERCIAL

## 2023-01-16 PROBLEM — O13.9 GESTATIONAL HYPERTENSION: Status: ACTIVE | Noted: 2023-01-16

## 2023-01-16 LAB
ALBUMIN SERPL-MCNC: 3.2 G/DL (ref 3.5–5.2)
ALBUMIN/GLOBULIN RATIO: 1 (ref 1–2.5)
ALP BLD-CCNC: 173 U/L (ref 35–104)
ALT SERPL-CCNC: 12 U/L (ref 5–33)
AMPHETAMINE SCREEN URINE: NEGATIVE
ANION GAP SERPL CALCULATED.3IONS-SCNC: 10 MMOL/L (ref 9–17)
AST SERPL-CCNC: 16 U/L
BARBITURATE SCREEN URINE: NEGATIVE
BENZODIAZEPINE SCREEN, URINE: NEGATIVE
BILIRUB SERPL-MCNC: 0.2 MG/DL (ref 0.3–1.2)
BUN BLDV-MCNC: 15 MG/DL (ref 6–20)
CALCIUM SERPL-MCNC: 9.1 MG/DL (ref 8.6–10.4)
CANNABINOID SCREEN URINE: NEGATIVE
CHLORIDE BLD-SCNC: 106 MMOL/L (ref 98–107)
CO2: 18 MMOL/L (ref 20–31)
COCAINE METABOLITE, URINE: NEGATIVE
CREAT SERPL-MCNC: 0.5 MG/DL (ref 0.5–0.9)
CREATININE URINE: 69 MG/DL (ref 28–217)
FENTANYL URINE: NEGATIVE
GFR SERPL CREATININE-BSD FRML MDRD: >60 ML/MIN/1.73M2
GLUCOSE BLD-MCNC: 76 MG/DL (ref 70–99)
METHADONE SCREEN, URINE: NEGATIVE
OPIATES, URINE: NEGATIVE
OXYCODONE SCREEN URINE: NEGATIVE
PHENCYCLIDINE, URINE: NEGATIVE
POTASSIUM SERPL-SCNC: 3.9 MMOL/L (ref 3.7–5.3)
SODIUM BLD-SCNC: 134 MMOL/L (ref 135–144)
TEST INFORMATION: NORMAL
TOTAL PROTEIN, URINE: 8 MG/DL
TOTAL PROTEIN: 6.3 G/DL (ref 6.4–8.3)
URINE TOTAL PROTEIN CREATININE RATIO: 0.12 (ref 0–0.2)

## 2023-01-16 PROCEDURE — 3E033VJ INTRODUCTION OF OTHER HORMONE INTO PERIPHERAL VEIN, PERCUTANEOUS APPROACH: ICD-10-PCS | Performed by: OBSTETRICS & GYNECOLOGY

## 2023-01-16 PROCEDURE — 80307 DRUG TEST PRSMV CHEM ANLYZR: CPT

## 2023-01-16 PROCEDURE — 1220000000 HC SEMI PRIVATE OB R&B

## 2023-01-16 PROCEDURE — 3E0DXGC INTRODUCTION OF OTHER THERAPEUTIC SUBSTANCE INTO MOUTH AND PHARYNX, EXTERNAL APPROACH: ICD-10-PCS | Performed by: OBSTETRICS & GYNECOLOGY

## 2023-01-16 PROCEDURE — 0HQ9XZZ REPAIR PERINEUM SKIN, EXTERNAL APPROACH: ICD-10-PCS | Performed by: OBSTETRICS & GYNECOLOGY

## 2023-01-16 PROCEDURE — 7200000001 HC VAGINAL DELIVERY

## 2023-01-16 PROCEDURE — 6360000002 HC RX W HCPCS: Performed by: NURSE ANESTHETIST, CERTIFIED REGISTERED

## 2023-01-16 PROCEDURE — 6370000000 HC RX 637 (ALT 250 FOR IP): Performed by: STUDENT IN AN ORGANIZED HEALTH CARE EDUCATION/TRAINING PROGRAM

## 2023-01-16 PROCEDURE — 6360000002 HC RX W HCPCS: Performed by: STUDENT IN AN ORGANIZED HEALTH CARE EDUCATION/TRAINING PROGRAM

## 2023-01-16 PROCEDURE — 6370000000 HC RX 637 (ALT 250 FOR IP)

## 2023-01-16 PROCEDURE — 3700000025 EPIDURAL BLOCK: Performed by: ANESTHESIOLOGY

## 2023-01-16 PROCEDURE — 82570 ASSAY OF URINE CREATININE: CPT

## 2023-01-16 PROCEDURE — 2580000003 HC RX 258: Performed by: STUDENT IN AN ORGANIZED HEALTH CARE EDUCATION/TRAINING PROGRAM

## 2023-01-16 PROCEDURE — 6360000002 HC RX W HCPCS

## 2023-01-16 PROCEDURE — 2500000003 HC RX 250 WO HCPCS: Performed by: NURSE ANESTHETIST, CERTIFIED REGISTERED

## 2023-01-16 PROCEDURE — 59400 OBSTETRICAL CARE: CPT | Performed by: OBSTETRICS & GYNECOLOGY

## 2023-01-16 PROCEDURE — 6360000002 HC RX W HCPCS: Performed by: ANESTHESIOLOGY

## 2023-01-16 PROCEDURE — 10907ZC DRAINAGE OF AMNIOTIC FLUID, THERAPEUTIC FROM PRODUCTS OF CONCEPTION, VIA NATURAL OR ARTIFICIAL OPENING: ICD-10-PCS | Performed by: OBSTETRICS & GYNECOLOGY

## 2023-01-16 PROCEDURE — 84156 ASSAY OF PROTEIN URINE: CPT

## 2023-01-16 RX ORDER — NALOXONE HYDROCHLORIDE 0.4 MG/ML
INJECTION, SOLUTION INTRAMUSCULAR; INTRAVENOUS; SUBCUTANEOUS PRN
Status: DISCONTINUED | OUTPATIENT
Start: 2023-01-16 | End: 2023-01-17 | Stop reason: HOSPADM

## 2023-01-16 RX ORDER — EPHEDRINE SULFATE 50 MG/ML
INJECTION INTRAVENOUS
Status: DISCONTINUED
Start: 2023-01-16 | End: 2023-01-17

## 2023-01-16 RX ORDER — DOCUSATE SODIUM 100 MG/1
100 CAPSULE, LIQUID FILLED ORAL 2 TIMES DAILY
Qty: 60 CAPSULE | Refills: 1 | Status: SHIPPED | OUTPATIENT
Start: 2023-01-16 | End: 2023-03-17

## 2023-01-16 RX ORDER — NALBUPHINE HYDROCHLORIDE 20 MG/ML
10 INJECTION, SOLUTION INTRAMUSCULAR; INTRAVENOUS; SUBCUTANEOUS ONCE
Status: COMPLETED | OUTPATIENT
Start: 2023-01-16 | End: 2023-01-16

## 2023-01-16 RX ORDER — ROPIVACAINE HYDROCHLORIDE 2 MG/ML
INJECTION, SOLUTION EPIDURAL; INFILTRATION; PERINEURAL PRN
Status: DISCONTINUED | OUTPATIENT
Start: 2023-01-16 | End: 2023-01-16 | Stop reason: SDUPTHER

## 2023-01-16 RX ORDER — IBUPROFEN 600 MG/1
600 TABLET ORAL EVERY 8 HOURS SCHEDULED
Status: DISCONTINUED | OUTPATIENT
Start: 2023-01-16 | End: 2023-01-17

## 2023-01-16 RX ORDER — LIDOCAINE HYDROCHLORIDE 10 MG/ML
INJECTION, SOLUTION INFILTRATION; PERINEURAL PRN
Status: DISCONTINUED | OUTPATIENT
Start: 2023-01-16 | End: 2023-01-16 | Stop reason: SDUPTHER

## 2023-01-16 RX ORDER — ONDANSETRON 2 MG/ML
4 INJECTION INTRAMUSCULAR; INTRAVENOUS EVERY 6 HOURS PRN
Status: DISCONTINUED | OUTPATIENT
Start: 2023-01-16 | End: 2023-01-17 | Stop reason: HOSPADM

## 2023-01-16 RX ORDER — IBUPROFEN 600 MG/1
600 TABLET ORAL EVERY 6 HOURS PRN
Qty: 30 TABLET | Refills: 1 | Status: SHIPPED | OUTPATIENT
Start: 2023-01-16

## 2023-01-16 RX ORDER — EPHEDRINE SULFATE/0.9% NACL/PF 50 MG/5 ML
10 SYRINGE (ML) INTRAVENOUS PRN
Status: DISCONTINUED | OUTPATIENT
Start: 2023-01-16 | End: 2023-01-17

## 2023-01-16 RX ORDER — LIDOCAINE HYDROCHLORIDE AND EPINEPHRINE 15; 5 MG/ML; UG/ML
INJECTION, SOLUTION EPIDURAL PRN
Status: DISCONTINUED | OUTPATIENT
Start: 2023-01-16 | End: 2023-01-16 | Stop reason: SDUPTHER

## 2023-01-16 RX ADMIN — ROPIVACAINE HYDROCHLORIDE 10 ML/HR: 2 INJECTION, SOLUTION EPIDURAL; INFILTRATION at 19:31

## 2023-01-16 RX ADMIN — Medication 166.7 ML: at 21:10

## 2023-01-16 RX ADMIN — SODIUM CHLORIDE, POTASSIUM CHLORIDE, SODIUM LACTATE AND CALCIUM CHLORIDE: 600; 310; 30; 20 INJECTION, SOLUTION INTRAVENOUS at 19:52

## 2023-01-16 RX ADMIN — LIDOCAINE HYDROCHLORIDE,EPINEPHRINE BITARTRATE 2 ML: 15; .005 INJECTION, SOLUTION EPIDURAL; INFILTRATION; INTRACAUDAL; PERINEURAL at 19:22

## 2023-01-16 RX ADMIN — Medication 87.3 MILLI-UNITS/MIN: at 21:24

## 2023-01-16 RX ADMIN — IBUPROFEN 600 MG: 600 TABLET, FILM COATED ORAL at 22:59

## 2023-01-16 RX ADMIN — Medication 1 MILLI-UNITS/MIN: at 11:46

## 2023-01-16 RX ADMIN — Medication 25 MCG: at 01:46

## 2023-01-16 RX ADMIN — LIDOCAINE HYDROCHLORIDE 3 ML: 10 INJECTION, SOLUTION INFILTRATION; PERINEURAL at 19:13

## 2023-01-16 RX ADMIN — LIDOCAINE HYDROCHLORIDE,EPINEPHRINE BITARTRATE 3 ML: 15; .005 INJECTION, SOLUTION EPIDURAL; INFILTRATION; INTRACAUDAL; PERINEURAL at 19:20

## 2023-01-16 RX ADMIN — ROPIVACAINE HYDROCHLORIDE 3 ML: 2 INJECTION, SOLUTION EPIDURAL; INFILTRATION; PERINEURAL at 19:27

## 2023-01-16 RX ADMIN — ROPIVACAINE HYDROCHLORIDE 5 ML: 2 INJECTION, SOLUTION EPIDURAL; INFILTRATION; PERINEURAL at 19:24

## 2023-01-16 RX ADMIN — NALBUPHINE HYDROCHLORIDE 10 MG: 20 INJECTION, SOLUTION INTRAMUSCULAR; INTRAVENOUS; SUBCUTANEOUS at 17:57

## 2023-01-16 RX ADMIN — Medication 25 MCG: at 06:31

## 2023-01-16 ASSESSMENT — PAIN SCALES - GENERAL
PAINLEVEL_OUTOF10: 4
PAINLEVEL_OUTOF10: 6

## 2023-01-16 ASSESSMENT — PAIN DESCRIPTION - LOCATION: LOCATION: ABDOMEN

## 2023-01-16 NOTE — PROGRESS NOTES
Labor Progress Note    Caleb Murdock is a 23 y.o. female  at 38w3d  The patient was seen and examined. Her pain is well controlled. She reports fetal movement is present, complains of contractions, denies loss of fluid, denies vaginal bleeding.        Vital Signs:  Vitals:    01/15/23 2232 23 0147 23 0624 23 0736   BP: 135/79 119/61 126/76 118/75   Pulse: 92 (!) 101 93 85   Resp: 16 16 16 17   Temp: 97.9 °F (36.6 °C)   98.6 °F (37 °C)   TempSrc:    Oral   SpO2: 99%          FHT: 150, moderate variability, accelerations present, decelerations absent  Contractions: none    Chaperone for Intimate Exam: Chaperone was present for entire exam, Chaperone Name: Preston Tong RN   Cervical Exam: 3 cm dilated, 50 effaced, -2 station  Pitocin: ordered now     Membranes: Intact  Scalp Electrode in place: absent  Intrauterine Pressure Catheter in Place: absent    Interventions: SVE     Assessment/Plan:  Caleb Murdock is a 23 y.o. female  at 38w3d admitted for RR IOL    - GBS negative, No indication for GBS prophylaxis   - VSS    - cEFM/TOCO: Category 1 FHT    - S/p Cytotec 25mcg BU x3    - Start pitocin per protocol    - Continue current care     Attending updated and in agreement with plan    Yoav Keenan MD  Ob/Gyn Resident  2023, 11:14 AM

## 2023-01-16 NOTE — PROGRESS NOTES
Labor Progress Note    Naye Bowles is a 22 y.o. female  at 39w3d  The patient was seen and examined. SVE and AROM (clear) performed at bedside without complication. Her pain is well controlled. She reports fetal movement is present, complains of contractions, complains of loss of fluid, denies vaginal bleeding.       Vital Signs:  Vitals:    23 1523 23 1628 23 1704 23 1735   BP:  121/82 (!) 137/91 (!) 147/102   Pulse:    100   Resp:    19   Temp: 97.9 °F (36.6 °C) 98.6 °F (37 °C)  98.2 °F (36.8 °C)   TempSrc: Oral Oral  Oral   SpO2:         FHT: 120, moderate variability, accelerations present, decelerations absent  Contractions: regular, every 2-4 minutes    Chaperone for Intimate Exam: Chaperone was present for entire exam, Chaperone Name: MARGUERITE Reid   Cervical Exam: 3 cm dilated, 50 effaced, 0 station  Pitocin: @ 4 mu/min    Membranes: Ruptured clear fluid  Scalp Electrode in place: absent  Intrauterine Pressure Catheter in Place: absent    Interventions: SVE, AROM (clr)     Assessment/Plan:  Naye Bowles is a 22 y.o. female  at 39w3d admitted for RR IOL               - GBS negative, No indication for GBS prophylaxis              - VSS               - cEFM/TOCO: Category 1 FHT               - S/p Cytotec 25mcg BU x3    - Epidural in place    - AROM (clear fluid) @ 1515              - Pitocin per protocol               - Continue current care     Attending updated and in agreement with plan    Bebo Barros MD  Ob/Gyn Resident  2023, 3:28 PM

## 2023-01-16 NOTE — DISCHARGE SUMMARY
Obstetric Discharge Summary  [facility]    Patient Name: Hawk Hopkins  Patient : 2000  Primary Care Physician: ABBEY Oglesby NP  Admit Date: 1/15/2023    Principal Diagnosis: IUP at 39w2d, admitted for RR IOL     Her pregnancy has been complicated by:   Patient Active Problem List   Diagnosis    Fam H/O DM (pt's mom)    BMI 39    H/O headaches    Rh negative state    History of heavy periods    SMA carrier status    Tachycardia    Hx Pre-E (G1)    Elevated Early 1 hr, normal 3 hr GTT. High-risk pregnancy in third trimester    Gestational hypertension (G2)     23 F Apg 8/9 Wt 7#13       Infection Present?: No  Hospital Acquired: No    Surgical Operations & Procedures:  Analgesia: epidural  Delivery Type: Spontaneous Vaginal Delivery: See Labor and Delivery Summary   Laceration(s): 1st degree repaired with suture    Consultations:  Anesthesia    Pertinent Findings & Procedures:   Hawk Hopkins is a 23 y.o. female  at 39w2d admitted for RR IOL; received cytotec 25 buccal x 3, AROM, epidural, nubain x 1, nitrous . She met criteria for gHTN, preE labs wnl, P/C 0.12. She delivered by spontaneous vaginal a Live Born infant on 23. Information for the patient's :  Wayne Sinner Girl Kimmy Asai [[id number]]   female   Birth Weight: 7 lb 13.6 oz (3.56 kg)     Apgars: 8 at 1 minute and 9 at 5 minutes.      Postpartum course: normal.      Course of patient: uncomplicated    Discharge to: Home    Readmission planned: no     Recommendations on Discharge:     Medications:      Medication List        START taking these medications      docusate sodium 100 MG capsule  Commonly known as: COLACE  Take 1 capsule by mouth 2 times daily     ibuprofen 600 MG tablet  Commonly known as: ADVIL;MOTRIN  Take 1 tablet by mouth every 6 hours as needed for Pain            CONTINUE taking these medications      aspirin 81 MG chewable tablet  Commonly known as: Aspirin Childrens  Take 1 tablet by mouth daily     EPINEPHrine 0.3 MG/0.3ML Soaj injection  Commonly known as: EPIPEN  Inject 0.3 mLs into the muscle once for 1 dose Use as directed for allergic reaction     PRENATAL VITAMINS PO               Where to Get Your Medications        These medications were sent to [address] [place] [address], [address] [place] [address] -  [phone]  [address] [place] [id number]      Phone: [phone]   docusate sodium 100 MG capsule  ibuprofen 600 MG tablet           Activity: pelvic rest x 6 weeks, no lifting greater than 15 lbs  Diet: regular diet  Follow up: 1 week for BP check    Condition on discharge: stable    Discharge date: 1/17/23    Bayron Trejo DO  Ob/Gyn Resident    Comments:  Home care and follow-up care were reviewed. Pelvic rest, and birth control were reviewed. Signs and symptoms of mastitis and post partum depression were reviewed. The patient is to notify her physician if any of these occur. The patient was counseled on secondary smoke risks and the increased risk of sudden infant death syndrome and respiratory problems to her baby with exposure. She was counseled on various alternate recommendations to decrease the exposure to secondary smoke to her children.

## 2023-01-16 NOTE — H&P
OBSTETRICAL HISTORY [facility]    Date: 1/15/2023       Time: 9:50 PM   Patient Name: Osiris Newman     Patient : 2000  Room/Bed: [facility]0706-    Admission Date/Time: 1/15/2023  7:59 PM      CC: Induction of Labor     HPI: Osiris Newman is a 23 y.o.  at 39w2d who presents RR IOL. The patient reports fetal movement is present, denies contractions, denies loss of fluid, denies vaginal bleeding. Patient denies headache, vision changes, nausea, vomiting, fever, chills, shortness of breath, chest pain, RUQ pain, abdominal pain, diarrhea, change in color/amount/odor of vaginal discharge, dysuria or, hematuria. DATING:  LMP: Patient's last menstrual period was 04/15/2022 (exact date). Estimated Date of Delivery: 23   Based on: LMP c/w early ultrasound, at 15 6/7 weeks GA    PREGNANCY RISK FACTORS:  Patient Active Problem List   Diagnosis    Fam H/O DM (pt's mom)    BMI 45    H/O headaches    Rh negative state    History of heavy periods    SMA carrier status    Tachycardia    Hx Pre-E (G1)    Elevated Early 1 hr, normal 3 hr GTT.     High-risk pregnancy in third trimester        Steroids Given In This Pregnancy:  no     REVIEW OF SYSTEMS:  Constitutional: negative fever, negative chills  HEENT: negative visual disturbances, negative headaches  Respiratory: negative dyspnea, negative cough  Cardiovascular: negative chest pain,  negative palpitations  Gastrointestinal: negative abdominal pain, negative RUQ pain, negative N/V, negative diarrhea, negative constipation  Genitourinary: negative dysuria, negative vaginal discharge, negative vaginal bleeding  Dermatological: negative rash  Hematologic: negative bruising  Immunologic/Lymphatic: negative recent illness, negative recent sick contact  Musculoskeletal: negative back pain, negative myalgias, negative arthralgias  Neurological:  negative dizziness, negative weakness  Behavior/Psych: negative depression, negative anxiety      OBSTETRIC HISTORY:   OB History    Para Term  AB Living   2 1 1 0 0 1   SAB IAB Ectopic Molar Multiple Live Births   0 0 0 0 0 1      # Outcome Date GA Lbr Jarod/2nd Weight Sex Delivery Anes PTL Lv   2 Current            1 Term 21 39w2d  7 lb 8.6 oz (3.42 kg) F Vag-Spont EPI N JOSEPH      Name: Harrison Railin  Apgar5: 9       PAST MEDICAL HISTORY:   has a past medical history of Hypertension and Pre-eclampsia. PAST SURGICAL HISTORY:   has a past surgical history that includes [place] tooth extraction. ALLERGIES:  is allergic to bee venom. MEDICATIONS:  Prior to Admission medications    Medication Sig Start Date End Date Taking? Authorizing Provider   Prenatal Vit-Fe Fumarate-FA (PRENATAL VITAMINS PO) Take 1 tablet by mouth 22   Historical Provider, MD   aspirin (ASPIRIN CHILDRENS) 81 MG chewable tablet Take 1 tablet by mouth daily 22   Josafat Little MD   EPINEPHrine (EPIPEN) 0.3 MG/0.3ML SOAJ injection Inject 0.3 mLs into the muscle once for 1 dose Use as directed for allergic reaction 18  Becky Medina APRN - CNP       FAMILY HISTORY:  family history includes Diabetes type 2  in her mother; No Known Problems in her father, maternal grandfather, maternal grandmother, paternal grandfather, paternal grandmother, and sister. SOCIAL HISTORY:   reports that she has never smoked. She has never used smokeless tobacco. She reports that she does not currently use alcohol. She reports that she does not use drugs.     VITALS:  Vitals:    01/15/23 2023   BP: (!) 129/91   Pulse: (!) 120   Resp: 16           PHYSICAL EXAM:  Fetal Heart Monitor:  Baseline Heart Rate 145, moderate variability, present accelerations, absent decelerations  [place]: contractions absent  General appearance:  no apparent distress, alert and cooperative  HEENT: head atraumatic, normocephalic, trachea midline, moist mucous membranes    Neurologic:  oriented, normal speech, no focal findings or movement disorder noted  Lungs:  no increased work of breathing, good air exchange. No use of accessory muscle, no cyanosis. Heart:  regular rate, no pitting edema, no cyanosis  Abdomen:  soft, gravid, non-tender on palpation, no right upper quadrant tenderness, uterus non-tender, no signs of abruption and no signs of chorioamnionitis  Extremities:  no calf tenderness bilaterally, non-edematous bilaterally   Musculoskeletal: no gross abnormalities, range of motion appropriate for age   Psychiatric: mood appropriate, normal affect   Rectal Exam: not indicated  Pelvic Exam:   Chaperone for Intimate Exam: Chaperone was present for entire exam, Chaperone Name: Shamika Trejo, RN  Sterile Vaginal Exam:  Cervix: No cervical motion tenderness   Uterus: Is gravid, Normal size, shape, consistency and non-tender   Adnexa: Non-tender, no palpable masses  Cervix: 1 cm dilated, 30 % effaced, -2 station, posterior position (out of 3 station), medium consistency, FETAL POSITION: Cephalic (confirmed by ultrasound), Membranes intact,    Bishops Score: 3     0 1 2 3   Position Posterior Intermediate Anterior -   Consistency Firm Intermediate Soft -   Effacement 0-30% 31-50% 51-80% >80%   Dilation 0cm 1-2cm 3-4cm >5cm   Fetal Station -3 -2 -1, 0 +1, +2     DATA:  Membranes Ruptured: No    LIMITED BEDSIDE US:  Position: Cephalic  Placental Location: anterior  Fetal Heart Tones: Present  Fetal Movement: Present   Amniotic Fluid Index/Volume: adequate > 2x2 cm fluid pocket  Estimated Fetal Weight:  9 lbs 6oz    PRENATAL LAB RESULTS:   Blood Type/Rh: A neg  Antibody Screen: negative  Hemoglobin, Hematocrit, Platelets: 44.0/11.0/240  Rubella: immune  T.  Pallidum, IgG: non-reactive  Hepatitis B Surface Antigen: non-reactive   Hepatitis C Antibody: non-reactive   HIV: non-reactive   Gonorrhea: negative  Chlamydia: negative  Urine culture: negative, date: 6/21/22    Early 1 hour Glucose Tolerance Test:  131  3 hour Glucose Tolerance Test: Fasting 82/ 1hr 152/ 2 hr 137/ 3 hr 88    Group B Strep: negative on 22  Cystic Fibrosis Screen: negative  Sickle Cell Screen: negative  First Trimester Screen: not available  MSAFP: na  Non-Invasive Prenatal Testing: not done  Anatomy US: anterior placenta, 3VC, female gender, normal anatomy    ASSESSMENT & PLAN:  Pacheco Chen is a 23 y.o. female  at 44w2d for RR IOL   - GBS negative / Rh negative / R immune   - No indication for GBS prophylaxis   - admit to L&D under service of Dr. Ngoc Pulido   - CBC, T&S, T.pal, UA w/ reflex, UDS pending     - IV fluids: LR@ 125 cc/hr   - Induction method: Cytotec   - CEFM/ TOCO   - Diet: Regular  - UDS ordered, Informed consent obtained. Discussed R/B/A. All questions and concerns answered. Patient verbalized understanding and agreement to plan  - Patient counseled on risk of shoulder dystocia. Advised of risk of fetal bruising and fracture, maternal vaginal laceration, potential for permanent brachial plexus injury, and fetal hypoxia that, in rare cases, can lead to permanent neurologic injury or death. Patient verbalizes understanding and wishes to proceed with vaginal delivery. Migraines   - Patient currently asymptomatic    - Denies s/sx of PreE   - Patient denies taking Rx for management       SMA carrier status   - Discovered in G1 pregnancy   - Patient has no genetic screening in this pregnancy      Elevated Early 1 hr, normal 3 hr GTT   - No diagnosis of gestation DM      Hx Pre-E (G1)   - Denies s/sx of PreE   - Pressures normotensive   - PreE labs wnl, P/C 0.19 on 23   - On ASA 81mg during this pregnancy       FHx DM (pt's mom)      BMI 45    Patient Active Problem List    Diagnosis Date Noted    High-risk pregnancy in third trimester 01/15/2023     Priority: Medium    Hx Pre-E (G1) 2022     Priority: Medium     Baby ASA @ 12 wks      Elevated Early 1 hr, normal 3 hr GTT. 2022     Repeat 3 hr WNL      Tachycardia 2021     And possible presyncopal episode, suspect POTS  Baseline EKG wnl 21      SMA carrier status 2021     SMN1+ carrier  Negative FOB testing      History of heavy periods 2021     OCPs in the past caused worsening acne and weight gain      Rh negative state 2021             Fam H/O DM (pt's mom) 2020     Early 1h       BMI 45 2020      testing 34 weeks      H/O headaches 2020     Usually treats with advil outside of pregnancy         Plan discussed with Dr. Nuzhat Augustine, who is agreeable. Steroids given this admission: No    Risks, benefits, alternatives and possible complications have been discussed in detail with the patient. Admission, and post admission procedures and expectations were discussed in detail. All questions were answered.     Attending's Name: Dr. Marquise Pinon MD  Ob/Gyn Resident  1/15/2023, 9:50 PM

## 2023-01-16 NOTE — ANESTHESIA PRE PROCEDURE
Department of Anesthesiology  Preprocedure Note       Name:  Francisco Coughlin   Age:  23 y. o.  :  2000                                          MRN:  [id number]         Date:  2023      Surgeon: * No surgeons listed *    Department of Anesthesiology  Pre-Anesthesia Evaluation/Consultation         Name:  Francisco Coughlin                                         Age:  23 y.o. MRN:  [id number]           Procedure (Scheduled):  Epidural  Surgeon:  Dr. Moya    Medications  No current facility-administered medications for this visit. No current outpatient medications on file.      Facility-Administered Medications Ordered in Other Visits   Medication Dose Route Frequency Provider Last Rate Last Admin    oxytocin (PITOCIN) 30 units in 500 mL infusion  1-20 kami-units/min IntraVENous Continuous Leonid Whalen MD 8 mL/hr at 23 1759 8 kami-units/min at 23 175    lactated ringers infusion   IntraVENous Continuous Bayron Trejo  mL/hr at 23 1759 Rate Verify at 23    lactated ringers bolus  500 mL IntraVENous PRN Bayron Trejo, DO        Or    lactated ringers bolus  1,000 mL IntraVENous PRN Bayron Trejo, DO        sodium chloride flush 0.9 % injection 5-40 mL  5-40 mL IntraVENous 2 times per day Bayron Trejo, DO        sodium chloride flush 0.9 % injection 5-40 mL  5-40 mL IntraVENous PRN Bayron Trejo, DO        0.9 % sodium chloride infusion  25 mL IntraVENous PRN Bayron Trejo, DO        ondansetron [facility]) injection 4 mg  4 mg IntraVENous Q6H PRN Bayron Trejo, DO        acetaminophen (TYLENOL) tablet 1,000 mg  1,000 mg Oral Q6H PRN Bayron Trejo, DO        benzocaine-menthol (DERMOPLAST) 20-0.5 % spray   Topical PRN Bayron Trejo, DO           Allergies   Allergen Reactions    Bee Venom Swelling     Patient Active Problem List   Diagnosis    Fam H/O DM (pt's mom)    BMI 39    H/O headaches    Rh negative state    History of heavy periods    SMA carrier status    Tachycardia    Hx Pre-E (G1)    Elevated Early 1 hr, normal 3 hr GTT.  High-risk pregnancy in third trimester    Gestational hypertension (G2)     Past Medical History:   Diagnosis Date    Hypertension     elevated BP in labor and started on Mag    Pre-eclampsia      Past Surgical History:   Procedure Laterality Date    WISDOM TOOTH EXTRACTION       Social History     Tobacco Use    Smoking status: Never    Smokeless tobacco: Never   Vaping Use    Vaping Use: Never used   Substance Use Topics    Alcohol use: Not Currently    Drug use: Never         Vital Signs (Current)   There were no vitals filed for this visit. Vital Signs Statistics (for past 48 hrs)     Temp  Av.8 °C (98.2 °F)  Min: 36.5 °C (97.7 °F)   Min taken time: 23 1157  Max: 37 °C (98.6 °F)   Max taken time: 23 1628  Pulse  Av.3  Min: 85   Min taken time: 23 0736  Max: 120   Max taken time: 01/15/23 2023  Resp  Av.9  Min: 16   Min taken time: 23 0624  Max: 19   Max taken time: 23 1735  BP  Min: 118/75   Min taken time: 23 0736  Max: 157/95   Max taken time: 23 1352  SpO2  Av %  Min: 99 %   Min taken time: 01/15/23 2232  Max: 99 %   Max taken time: 01/15/23 2232  BP Readings from Last 3 Encounters:   23 (!) 147/102   23 128/75   23 130/82       BMI  There is no height or weight on file to calculate BMI.     CBC   Lab Results   Component Value Date/Time    WBC 18.1 01/15/2023 09:12 PM    RBC 4.18 01/15/2023 09:12 PM    HGB 9.7 01/15/2023 09:12 PM    HCT 31.5 01/15/2023 09:12 PM    MCV 75.4 01/15/2023 09:12 PM    RDW 15.5 01/15/2023 09:12 PM     01/15/2023 09:12 PM       CMP    Lab Results   Component Value Date/Time     2023 10:30 AM    K 4.6 2023 10:30 AM     2023 10:30 AM    CO2 22 2023 10:30 AM    BUN 10 2023 10:30 AM    CREATININE 0.54 2023 10:30 AM    GFRAA >60 2022 03:00 PM LABGLOM >60 01/11/2023 10:30 AM    GLUCOSE 85 01/11/2023 10:30 AM    PROT 6.1 01/11/2023 10:30 AM    CALCIUM 8.9 01/11/2023 10:30 AM    BILITOT 0.2 01/11/2023 10:30 AM    ALKPHOS 167 01/11/2023 10:30 AM    AST 13 01/11/2023 10:30 AM    ALT 11 01/11/2023 10:30 AM       BMP    Lab Results   Component Value Date/Time     01/11/2023 10:30 AM    K 4.6 01/11/2023 10:30 AM     01/11/2023 10:30 AM    CO2 22 01/11/2023 10:30 AM    BUN 10 01/11/2023 10:30 AM    CREATININE 0.54 01/11/2023 10:30 AM    CALCIUM 8.9 01/11/2023 10:30 AM    GFRAA >60 07/14/2022 03:00 PM    LABGLOM >60 01/11/2023 10:30 AM    GLUCOSE 85 01/11/2023 10:30 AM       POC Testing  No results for input(s): POCGLU, POCNA, POCK, POCCL, POCBUN, POCHEMO, POCHCT in the last 72 hours. Coags  No results found for: PROTIME, INR, APTT    HCG (If Applicable)   Lab Results   Component Value Date    PREGTESTUR positive 06/21/2022        ABGs No results found for: PHART, PO2ART, KKE8PWD, DZJ4QGC, BEART, S0JLJTFN     Type & Screen (If Applicable)  No results found for: LABABO, [address]    Radiology (If Applicable)    Cardiac Testing (If Applicable)     EKG (If Applicable)           Procedure: * No procedures listed *    Medications prior to admission:   Prior to Admission medications    Medication Sig Start Date End Date Taking? Authorizing Provider   Prenatal Vit-Fe Fumarate-FA (PRENATAL VITAMINS PO) Take 1 tablet by mouth 4/14/22   Historical Provider, MD   aspirin (ASPIRIN CHILDRENS) 81 MG chewable tablet Take 1 tablet by mouth daily 7/11/22   Josafat Little MD   EPINEPHrine (EPIPEN) 0.3 MG/0.3ML SOAJ injection Inject 0.3 mLs into the muscle once for 1 dose Use as directed for allergic reaction 6/6/18 2/18/21  ABBEY Butler - CNP       Current medications:    No current facility-administered medications for this visit. No current outpatient medications on file.      Facility-Administered Medications Ordered in Other Visits   Medication Dose Route Frequency Provider Last Rate Last Admin    oxytocin (PITOCIN) 30 units in 500 mL infusion  1-20 kami-units/min IntraVENous Continuous Mango El MD 8 mL/hr at 01/16/23 1759 8 kami-units/min at 01/16/23 1759    lactated ringers infusion   IntraVENous Continuous Brandin Mary,  mL/hr at 01/16/23 1759 Rate Verify at 01/16/23 1759    lactated ringers bolus  500 mL IntraVENous PRN Brandin Mary, DO        Or    lactated ringers bolus  1,000 mL IntraVENous PRN Brandin Mary, DO        sodium chloride flush 0.9 % injection 5-40 mL  5-40 mL IntraVENous 2 times per day Brandin Mary, DO        sodium chloride flush 0.9 % injection 5-40 mL  5-40 mL IntraVENous PRN Brandin Mary, DO        0.9 % sodium chloride infusion  25 mL IntraVENous PRN Brandin Mary, DO        ondansetron [facility]) injection 4 mg  4 mg IntraVENous Q6H PRN Brandin Mary, DO        acetaminophen (TYLENOL) tablet 1,000 mg  1,000 mg Oral Q6H PRN Brandin Mary, DO        benzocaine-menthol (DERMOPLAST) 20-0.5 % spray   Topical PRN Brandin Mary, DO           Allergies: Allergies   Allergen Reactions    Bee Venom Swelling       Problem List:    Patient Active Problem List   Diagnosis Code    Fam H/O DM (pt's mom) Z83.3    BMI 39 Z68.37    H/O headaches Z87.898    Rh negative state O26.899, Z67.91    History of heavy periods Z87.42    SMA carrier status Z14.8    Tachycardia R00.0    Hx Pre-E (G1) O09.299    Elevated Early 1 hr, normal 3 hr GTT.  O80.46    High-risk pregnancy in third trimester O09.93    Gestational hypertension (G2) O13.9       Past Medical History:        Diagnosis Date    Hypertension 2021    elevated BP in labor and started on Mag    Pre-eclampsia        Past Surgical History:        Procedure Laterality Date    WISDOM TOOTH EXTRACTION         Social History:    Social History     Tobacco Use    Smoking status: Never    Smokeless tobacco: Never   Substance Use Topics    Alcohol use: Not Currently Counseling given: Not Answered      Vital Signs (Current): There were no vitals filed for this visit. BP Readings from Last 3 Encounters:   01/16/23 (!) 147/102   01/12/23 128/75   01/11/23 130/82       NPO Status:                                                                                 BMI:   Wt Readings from Last 3 Encounters:   01/11/23 291 lb 9.6 oz (132.3 kg)   01/06/23 288 lb (130.6 kg)   12/30/22 289 lb 6.4 oz (131.3 kg)     There is no height or weight on file to calculate BMI.    CBC:   Lab Results   Component Value Date/Time    WBC 18.1 01/15/2023 09:12 PM    RBC 4.18 01/15/2023 09:12 PM    HGB 9.7 01/15/2023 09:12 PM    HCT 31.5 01/15/2023 09:12 PM    MCV 75.4 01/15/2023 09:12 PM    RDW 15.5 01/15/2023 09:12 PM     01/15/2023 09:12 PM       CMP:   Lab Results   Component Value Date/Time     01/11/2023 10:30 AM    K 4.6 01/11/2023 10:30 AM     01/11/2023 10:30 AM    CO2 22 01/11/2023 10:30 AM    BUN 10 01/11/2023 10:30 AM    CREATININE 0.54 01/11/2023 10:30 AM    GFRAA >60 07/14/2022 03:00 PM    LABGLOM >60 01/11/2023 10:30 AM    GLUCOSE 85 01/11/2023 10:30 AM    PROT 6.1 01/11/2023 10:30 AM    CALCIUM 8.9 01/11/2023 10:30 AM    BILITOT 0.2 01/11/2023 10:30 AM    ALKPHOS 167 01/11/2023 10:30 AM    AST 13 01/11/2023 10:30 AM    ALT 11 01/11/2023 10:30 AM       POC Tests: No results for input(s): POCGLU, POCNA, POCK, POCCL, POCBUN, POCHEMO, POCHCT in the last 72 hours.     Coags: No results found for: PROTIME, INR, APTT    HCG (If Applicable):   Lab Results   Component Value Date    PREGTESTUR positive 06/21/2022        ABGs: No results found for: PHART, PO2ART, DQR0NJZ, OWY5NMO, BEART, P1TBDKFC     Type & Screen (If Applicable):  No results found for: LABABO, LABRH    Drug/Infectious Status (If Applicable):  Lab Results   Component Value Date/Time    HEPCAB NONREACTIVE 07/14/2022 03:00 PM       COVID-19 Screening (If Applicable):   Lab Results   Component Value Date/Time    COVID19 Not Detected 06/15/2021 06:50 AM           Anesthesia Evaluation  Patient summary reviewed and Nursing notes reviewed  Airway: Mallampati: II  TM distance: >3 FB   Neck ROM: full  Mouth opening: > = 3 FB   Dental: normal exam         Pulmonary:Negative Pulmonary ROS and normal exam                               Cardiovascular:    (+) hypertension:,         Rhythm: regular  Rate: normal                    Neuro/Psych:   Negative Neuro/Psych ROS              GI/Hepatic/Renal: Neg GI/Hepatic/Renal ROS            Endo/Other: Negative Endo/Other ROS                    Abdominal:   (+) obese,     Abdomen: soft. Vascular: negative vascular ROS. Other Findings:             Anesthesia Plan      epidural     ASA 2             Anesthetic plan and risks discussed with patient. Use of blood products discussed with patient whom consented to blood products. Plan discussed with attending.     Attending anesthesiologist reviewed and agrees with Preprocedure content                ABBEY Posada - CRNA   1/16/2023

## 2023-01-16 NOTE — PROGRESS NOTES
Labor Progress Note    Ranjeet Mccormick is a 23 y.o. female  at 38w3d  The patient was seen and examined. Her pain is well controlled. She reports fetal movement is present, denies contractions, denies loss of fluid, denies vaginal bleeding.        Vital Signs:  Vitals:    01/15/23 2023 01/15/23 2030 01/15/23 2232 23 0147   BP: (!) 129/91  135/79 119/61   Pulse: (!) 120  92 (!) 101   Resp:  16   Temp:   97.9 °F (36.6 °C)    SpO2:  99% 99%          FHT:  145 , moderate variability, accelerations present, decelerations absent  Contractions: Rare    Chaperone for Intimate Exam: Chaperone was present for entire exam, Chaperone Name: Jossie Silva RN  Cervical Exam: 1-2 cm dilated, 30 effaced, -2 station  Pitocin: @ 0 mu/min    Membranes: Intact  Scalp Electrode in place: absent  Intrauterine Pressure Catheter in Place: absent    Interventions: SVE    Assessment/Plan:  Ranjeet Mccormick is a 23 y.o. female  at 39w3d admitted for IUP   - GBS negative, No indication for GBS prophylaxis   - VSS, Afebrile   - CEFM/TOCO showing cat 1 tracing   - Cytotec 25mcg BU x2   - Cervix -/-2    Attending updated and in agreement with plan    Bg Pham MD  Ob/Gyn Resident  2023, 6:24 AM

## 2023-01-16 NOTE — CARE COORDINATION
ANTEPARTUM NOTE    High-risk pregnancy in third trimester [O09.93]    Chase Barry was admitted to L&D on 1/15/2023 for IOL @ 44 2/7    OB GYN Provider: Dr. Jorge Pratt    Will meet with patient after delivery to verify name/address/phone/insurance and discuss discharge planning. Anticipate DC home 2 nights after vaginal delivery or 4 nights after C/S delivery as long as hemodynamically stable.

## 2023-01-17 VITALS
DIASTOLIC BLOOD PRESSURE: 73 MMHG | RESPIRATION RATE: 16 BRPM | OXYGEN SATURATION: 98 % | TEMPERATURE: 98.3 F | HEART RATE: 78 BPM | SYSTOLIC BLOOD PRESSURE: 127 MMHG

## 2023-01-17 LAB — FETAL SCREEN: NORMAL

## 2023-01-17 PROCEDURE — 6360000002 HC RX W HCPCS: Performed by: STUDENT IN AN ORGANIZED HEALTH CARE EDUCATION/TRAINING PROGRAM

## 2023-01-17 PROCEDURE — 6370000000 HC RX 637 (ALT 250 FOR IP): Performed by: STUDENT IN AN ORGANIZED HEALTH CARE EDUCATION/TRAINING PROGRAM

## 2023-01-17 PROCEDURE — 85461 HEMOGLOBIN FETAL: CPT

## 2023-01-17 PROCEDURE — 2580000003 HC RX 258: Performed by: STUDENT IN AN ORGANIZED HEALTH CARE EDUCATION/TRAINING PROGRAM

## 2023-01-17 RX ORDER — SODIUM CHLORIDE 0.9 % (FLUSH) 0.9 %
5-40 SYRINGE (ML) INJECTION PRN
Status: DISCONTINUED | OUTPATIENT
Start: 2023-01-17 | End: 2023-01-18 | Stop reason: HOSPADM

## 2023-01-17 RX ORDER — DOCUSATE SODIUM 100 MG/1
100 CAPSULE, LIQUID FILLED ORAL 2 TIMES DAILY
Status: DISCONTINUED | OUTPATIENT
Start: 2023-01-17 | End: 2023-01-18 | Stop reason: HOSPADM

## 2023-01-17 RX ORDER — BISACODYL 10 MG
10 SUPPOSITORY, RECTAL RECTAL DAILY PRN
Status: DISCONTINUED | OUTPATIENT
Start: 2023-01-17 | End: 2023-01-18 | Stop reason: HOSPADM

## 2023-01-17 RX ORDER — SODIUM CHLORIDE 9 MG/ML
INJECTION, SOLUTION INTRAVENOUS PRN
Status: DISCONTINUED | OUTPATIENT
Start: 2023-01-17 | End: 2023-01-18 | Stop reason: HOSPADM

## 2023-01-17 RX ORDER — IBUPROFEN 600 MG/1
600 TABLET ORAL EVERY 6 HOURS SCHEDULED
Status: DISCONTINUED | OUTPATIENT
Start: 2023-01-17 | End: 2023-01-18 | Stop reason: HOSPADM

## 2023-01-17 RX ORDER — SIMETHICONE 80 MG
80 TABLET,CHEWABLE ORAL EVERY 6 HOURS PRN
Status: DISCONTINUED | OUTPATIENT
Start: 2023-01-17 | End: 2023-01-18 | Stop reason: HOSPADM

## 2023-01-17 RX ORDER — SODIUM CHLORIDE 0.9 % (FLUSH) 0.9 %
5-40 SYRINGE (ML) INJECTION EVERY 12 HOURS SCHEDULED
Status: DISCONTINUED | OUTPATIENT
Start: 2023-01-17 | End: 2023-01-18 | Stop reason: HOSPADM

## 2023-01-17 RX ORDER — ACETAMINOPHEN 500 MG
1000 TABLET ORAL EVERY 6 HOURS
Status: DISCONTINUED | OUTPATIENT
Start: 2023-01-17 | End: 2023-01-18 | Stop reason: HOSPADM

## 2023-01-17 RX ORDER — ONDANSETRON 2 MG/ML
4 INJECTION INTRAMUSCULAR; INTRAVENOUS EVERY 4 HOURS PRN
Status: DISCONTINUED | OUTPATIENT
Start: 2023-01-17 | End: 2023-01-18 | Stop reason: HOSPADM

## 2023-01-17 RX ORDER — LANOLIN 72 %
OINTMENT (GRAM) TOPICAL PRN
Status: DISCONTINUED | OUTPATIENT
Start: 2023-01-17 | End: 2023-01-18 | Stop reason: HOSPADM

## 2023-01-17 RX ADMIN — HUMAN RHO(D) IMMUNE GLOBULIN 300 MCG: 300 INJECTION, SOLUTION INTRAMUSCULAR at 12:02

## 2023-01-17 RX ADMIN — DOCUSATE SODIUM 100 MG: 100 CAPSULE ORAL at 09:10

## 2023-01-17 RX ADMIN — IBUPROFEN 600 MG: 600 TABLET, FILM COATED ORAL at 06:28

## 2023-01-17 RX ADMIN — ACETAMINOPHEN 1000 MG: 500 TABLET ORAL at 13:58

## 2023-01-17 RX ADMIN — SODIUM CHLORIDE, PRESERVATIVE FREE 10 ML: 5 INJECTION INTRAVENOUS at 14:03

## 2023-01-17 RX ADMIN — IBUPROFEN 600 MG: 600 TABLET, FILM COATED ORAL at 18:42

## 2023-01-17 RX ADMIN — DOCUSATE SODIUM 100 MG: 100 CAPSULE ORAL at 02:05

## 2023-01-17 RX ADMIN — BENZOCAINE AND LEVOMENTHOL: 200; 5 SPRAY TOPICAL at 02:06

## 2023-01-17 RX ADMIN — ACETAMINOPHEN 1000 MG: 500 TABLET ORAL at 20:50

## 2023-01-17 RX ADMIN — ACETAMINOPHEN 1000 MG: 500 TABLET ORAL at 02:06

## 2023-01-17 RX ADMIN — ACETAMINOPHEN 1000 MG: 500 TABLET ORAL at 09:11

## 2023-01-17 RX ADMIN — IBUPROFEN 600 MG: 600 TABLET, FILM COATED ORAL at 12:57

## 2023-01-17 ASSESSMENT — PAIN DESCRIPTION - LOCATION
LOCATION: ABDOMEN
LOCATION: PERINEUM
LOCATION: PERINEUM
LOCATION: BACK
LOCATION: PERINEUM
LOCATION: BACK

## 2023-01-17 ASSESSMENT — PAIN SCALES - GENERAL
PAINLEVEL_OUTOF10: 3
PAINLEVEL_OUTOF10: 5
PAINLEVEL_OUTOF10: 4
PAINLEVEL_OUTOF10: 4
PAINLEVEL_OUTOF10: 5

## 2023-01-17 ASSESSMENT — PAIN DESCRIPTION - DESCRIPTORS
DESCRIPTORS: ACHING;DISCOMFORT;SORE
DESCRIPTORS: ACHING;DISCOMFORT
DESCRIPTORS: ACHING;DISCOMFORT;CRAMPING
DESCRIPTORS: CRAMPING
DESCRIPTORS: DISCOMFORT

## 2023-01-17 ASSESSMENT — PAIN - FUNCTIONAL ASSESSMENT
PAIN_FUNCTIONAL_ASSESSMENT: ACTIVITIES ARE NOT PREVENTED

## 2023-01-17 ASSESSMENT — PAIN DESCRIPTION - ORIENTATION
ORIENTATION: LOWER
ORIENTATION: LOWER
ORIENTATION: LOWER;MID
ORIENTATION: MID;LOWER

## 2023-01-17 NOTE — PROGRESS NOTES
Pt up to br with assisstance, voided without difficulty, pericare instruction give with return demo, transferred to  via wc with baby I arms

## 2023-01-17 NOTE — PROGRESS NOTES
2048 Pt educated on the use of self-administered  nitrous oxide for pain control and side effects. Pt educated on when and how to use the mask appropriately. Pt educated that she is the only person allowed to hold the mask to her face and that no one should prop the mask against her face. Pt also educated that she is the only person in the room allowed to use the mask. Pt informed that she cannot be out of bed without a trained support person with her. Pt completed a return demonstration of the use of nitrous oxide and all questions and concerns were addressed. RN verified the presence of a signed agreement form for the nitrous oxide. Pre-intervention VS, assessment, and FHT'st as charted. Nitrous oxide and oxygen at a 50-50 mix was initiated at 2048 . RN remains at bedside for the first 15 mins post initiation. Pt has experienced  noas side effects at this time.   Dr Pete Guerrier at bedside, delivery anticipated, monitoring [id number]

## 2023-01-17 NOTE — ANESTHESIA POSTPROCEDURE EVALUATION
Department of Anesthesiology  Postprocedure Note    Patient: Felix Scott  MRN: [id number]  YOB: 2000  Date of evaluation: 1/16/2023      Procedure Summary     Date: 01/16/23 Room / Location:     Anesthesia Start: 1847 Anesthesia Stop: 2105    Procedure: Labor Analgesia Diagnosis:     Scheduled Providers:  Responsible Provider: Raj Huang MD    Anesthesia Type: epidural ASA Status: 2          Anesthesia Type: No value filed.     Monica Phase I: Monica Score: 10    Monica Phase II:        Anesthesia Post Evaluation    Patient location during evaluation: floor  Patient participation: complete - patient participated  Level of consciousness: awake  Pain score: 0  Airway patency: patent  Nausea & Vomiting: no vomiting and no nausea  Complications: no  Cardiovascular status: blood pressure returned to baseline  Respiratory status: acceptable  Hydration status: stable

## 2023-01-17 NOTE — PROGRESS NOTES
Labor Progress Note    Ranjeet Mccormick is a 23 y.o. female  at 38w3d  The patient was seen and examined. Her pain is well controlled. She reports fetal movement is present, complains of contractions, complains of loss of fluid, denies vaginal bleeding.        Vital Signs:  Vitals:    23   BP: 116/64   Pulse: 92   Resp:    Temp:    SpO2: 97%       FHT: 140, moderate variability, accelerations present, some variable decelerations   Contractions: regular, every 2-4 minutes    Chaperone for Intimate Exam: Chaperone was present for entire exam, Chaperone Name: Hussein Ward RN  Cervical Exam: 4 cm dilated, 70 effaced, 0 station  Pitocin: @ 8 mu/min    Membranes: Ruptured clear fluid  Scalp Electrode in place: absent  Intrauterine Pressure Catheter in Place: absent    Interventions: SVE    Assessment/Plan:  Ranjeet Mccormick is a 23 y.o. female  at 38w3d admitted for IUP   - GBS negative, No indication for GBS prophylaxis   - VSS   - CEFM/TOCO showing overall cat 1 tracing    - S/p Cytotec 25mcg BU x3   - Epidural placed   - AROM (clr) @ 1515   - Pit per protocol   - Continue current management      Attending updated and in agreement with plan    Bg Pham MD  Ob/Gyn Resident  2023, 8:00 PM

## 2023-01-17 NOTE — PROGRESS NOTES
Self-administered nitrous oxide discontinued at 2130  due to delivery . Pt assessment, VS, and FHT's as charted.  Delivered viable female at 2105, RN at bedside monitoring fhr, Dr Elfego Pablo here at 2057

## 2023-01-17 NOTE — L&D DELIVERY NOTE
Mother's Information      Labor Events      Cervical Ripening:   Now               Jelena, Baby Girl Christin Murguia [[id number]]      Labor Events      Cervical Ripening Date/Time:     Cervical Ripening Type: Misoprostol  Antibiotics Received during Labor?: No  Rupture Date/Time: 23 15:24:00   Rupture Type: AROM  Fluid Color: Clear  Fluid Odor: None  Induction: Misoprostol, Oxytocin  Labor Complications: None       Anesthesia    Method: Epidural       Start Pushing      Labor onset date/time:   Now     Dilation complete date/time:   Now     Start pushing date/time:    Decision date/time (emergent ):           Labor Length    3rd stage: 0h 12m       Delivery ()      Delivery Date/Time:  23 21:05:00   Delivery Type: Vaginal, Spontaneous    Details:            [place] Presentation    Presentation: Vertex       Shoulder Dystocia    Shoulder Dystocia Present?: No  Add Second Maneuver  Add Third Maneuver  Add Fourth Maneuver  Add Fifth Maneuver  Add Sixth Maneuver  Add Seventh Maneuver  Add Eighth Maneuver  Add Ninth Maneuver       Assisted Delivery Details    Forceps Attempted?: No  Vacuum Extractor Attempted?: No       Document Additional Attempt         Document Additional Attempt                 Cord    Vessels: 3 Vessels  Complications: None  Delayed Cord Clamping?: Yes  Cord Clamped Date/Time: 2023 21:17:28  Cord Blood Disposition: Lab  Gases Sent?: Yes       Placenta    Date/Time: 2023 21:17:41  Removal: Spontaneous  Appearance: Intact       Lacerations    Episiotomy: None  Perineal Lacerations: 1st  Other Lacerations: no non-perineal laceration       Vaginal Counts        Sponges Needles Instruments   Initial Counts Correct  Correct   Final Counts Correct Correct Correct   Final Count Personnel: [facility]  Final Count Verified By: Jillian Martin  Accurate Final Count?: Yes  If the count is incorrect due to Intentionally Retained Foreign Object (IRFO) add the IRFO LDA in Lines/Drains. Add LDA: Link to [facility]       Blood Loss  Mother: Jenn Gruber #[id number]     Start of Mother's Information      Delivery Blood Loss  23 0905 - 23 2239      Quantitative Blood Loss (mL) Hospital Encounter 50 grams    Total  50 mL               End of Mother's Information  Mother: Jenn Gruber #[id number]                Delivery Providers    Delivering clinician: Reza Campos MD     Provider Role     Obstetrician    Geovany Fermin RN Primary Nurse     Primary [place] Nurse     NICU Nurse     Neonatologist     Anesthesiologist     Nurse Anesthetist     Nurse Practitioner     Midwife    Lorna Powell, MARGUERITE Nursery Nurse    Valentine De La Torre,                 Assessment    Living Status: Living     Apgar Scoring Key:    0 1 2    Skin Color: Blue or pale Acrocyanotic Completely pink    Heart Rate: Absent <100 bpm >100 bpm    Reflex Irritability: No response Grimace Cry or active withdrawal    Muscle Tone: Limp Some flexion Active motion    Respiratory Effort: Absent Weak cry; hypoventilation Good, crying                      Skin Color:   Heart Rate:   Reflex Irritability:   Muscle Tone:   Respiratory Effort:    Total:            1 Minute:    0    2    2    2    2    8        Apgar 1 total from OB History    5 Minute:    1    2    2    2    2    9        Apgar 5 total from OB History    10 Minute:              15 Minute:              20 Minute:                        Apgars Assigned By: Branda Kidney              Resuscitation    Method: Bulb Suction, Room Air             [place] Measurements      Birth Weight: 3560 g Birth Length: 0.521 m              Title      Skin to Skin Initiation Date/Time:  20:15:00 EST     Skin to Skin With: Mother     Skin to Skin End Date/Time:                      Vaginal Delivery Note  Department of Obstetrics and Gynecology  [facility]       Patient: Jorge Luis Jones   : 2000  MRN: [id number]   Date of delivery: 23     Pre-operative Diagnosis: Julio Pena at 39w3d  RR IOL  gHTN  BMI 45    Post-operative Diagnosis:  same as above and  living infant female    Delivering Obstetrician & Assistant(s): Dr. Elfego Pablo; Cory Claire DO    Infant Information:   Information for the patient's :  Elizabeth Pond [[id number]]      Information for the patient's :  Elizabeth Pond [[id number]]        Apgar scores: 8 at 1 minute and 9 at 5 minutes. Anesthesia:  epidural anesthesia    Application and Delivery:    She was known to be GBS negative. The patient progressed well, received an epidural, became complete and felt the urge to push. After pushing with contractions the fetal head delivered Cephalic, left occiput anterior over an intact perineum, nuchal cord was not present. The anterior, then posterior shoulder delivered easily and atraumatically followed by the rest of the infant. Nose and mouth suctioned with bulb suction, infant was stimulated and dried. Cord was clamped and cut after one minute delayed cord clamping  and infant was placed on mom, and attended by RN for evaluation. The delivery of the placenta was spontaneous and appeared intact, whole, and that the umbilical cord had three vessels noted. Pitocin was started. The vagina was swept of all clots and debris. The perineum and vagina were evaluated and a midline 1st degree laceration was found and repaired in standard fashion with 3-0 vicryl. Mother and baby tolerated procedure well. Dr. Elfego Pablo was present for entire delivery.     Delivery Summary:       Specimen: cord blood, and cord gases  Quantitative blood loss:  50ml immediately following delivery  Condition:  infant stable to general nursery  Counts: instrument and sponge counts correct  Blood Type and Rh: A NEGATIVE    Rubella Immunity Status: immune    Cory Claire DO  Ob/Gyn Resident  2023, 10:39 PM

## 2023-01-17 NOTE — PROGRESS NOTES
POST PARTUM DAY # 1    Malathi Salazar is a 23 y.o. female  This patient was seen & examined today. Her pregnancy was complicated by:   Patient Active Problem List   Diagnosis    Fam H/O DM (pt's mom)    BMI 45    H/O headaches    Rh negative state    History of heavy periods    SMA carrier status    Tachycardia    Hx Pre-E (G1)    Elevated Early 1 hr, normal 3 hr GTT. High-risk pregnancy in third trimester    Gestational hypertension (G2)     23 F Apg 8/9 Wt 7#13       Today she is doing well without any chief complaint. Her lochia is light. She denies chest pain, shortness of breath, headache, lightheadedness, and blurred vision. She is  breast feeding and she denies any breast tenderness. She is ambulating well. Her voiding pattern is normal. I reviewed signs and symptoms of post partum depression with the patient, she currently denies any of these symptoms. She is tolerating solids.      Vital Signs:  Vitals:    23 2315 23 2330 23 2345 23 0000   BP: 127/63 119/62 112/61 114/73   Pulse: 99 99 (!) 114 (!) 108   Resp: 16   17   Temp:    98.9 °F (37.2 °C)   TempSrc:    Oral   SpO2:    96%         Physical Exam:  General:  no apparent distress, alert, and cooperative  Neurologic:  alert, oriented, normal speech, no focal findings or movement disorder noted  Lungs:  No increased work of breathing, good air exchange, clear to auscultation bilaterally, no crackles or wheezing  Heart:  regular rate and rhythm    Abdomen: abdomen soft, non-distended, non-tender  Fundus: non-tender, normal size, firm, below umbilicus  Extremities:  no calf tenderness, non edematous    Lab:  Lab Results   Component Value Date    HGB 9.7 (L) 01/15/2023     Lab Results   Component Value Date    HCT 31.5 (L) 01/15/2023       Assessment/Plan:  Malathi Salazar is a [id number] PPD # 1 s/p    - Doing well, VSS   - female infant in [place] [id number] Nursery   - Encourage ambulation   - Labs if sx   - Motrin and tylenol for pain   - Continue post partum care  Rh negative/Rubella immune   - MMR not indicated    - Rhogam work up indicated  Breast feeding    - Denies s/s of mastits  gHTN   - Denies s/s of pre E   - BP normotensive   - Pre E labs ordered for this morning  BMI 45    Counseling Completed:  Secondary Smoke risks and Sudden Infant Death Syndrome were reviewed with recommendations. Infant sleeping, \"back to sleep\" and avoidance of co-sleeping recommendations were reviewed. Signs and Symptoms of Post Partum Depression were reviewed. The patient is to call if any occur. Signs and symptoms of Mastitis were reviewed. The patient is to call if any occur for follow up.   Discharge instructions including pelvic rest, no driving with pain medicine and office follow-up were reviewed with patient     Attending Physician: Dr. Lalito Kaufman DO  Ob/Gyn Resident   1/17/2023, 1:44 AM

## 2023-01-17 NOTE — CARE COORDINATION
Social Work     Sw reviewed medical record (current active problem list) and tox screens and found no current concerns. Sw spoke with mom and dad briefly to explain Sw role, inquire if any needs or concerns, and provide safe sleep education and discuss. Mom denied any needs or questions and informs baby has a safe sleep environment (bassinet). Mom denied any current s/s of anxiety or depression and is aware to reach out to OB if any s/s occur after dc. Mom reports a great support system and denied any current questions or needs. Mom reports this is her her 2nd child, she also has a 3year old daughter. Mom states ped will be Dr. Francesca Carpenter. Sw encouraged mom to reach out if any issues or concerns arise.

## 2023-01-17 NOTE — CARE COORDINATION
CASE MANAGEMENT POST-PARTUM TRANSITIONAL CARE PLAN    High-risk pregnancy in third trimester [O09.93]    OB Provider: Dr Jeremie Denis met w/ Wilmar Mitchell at bedside to discuss DCP. She is S/P  on 2023    Writer verified name/phone number correct on facesheet. This CM added new address. [address] [address], [address] [place] [address]    She states she lives with her , her parents and her 13 m.o child. BCBS insurance correct. Writer notified Wilmar Mitchell she has 30 days from date of birth to add  to insurance policy. Naye verbalized understanding. Wilmar Mitchell confirmed a safe place for infant to sleep at home. Infant name on BC: Ntirety. Infant PCP Dr. Geeta Murguia.      DME: no  HOME CARE: no    Anticipate DC of couplet 2023    Readmission Risk              Risk of Unplanned Readmission:  4

## 2023-01-17 NOTE — LACTATION NOTE
Breastfeeding packet given. Mom reports her baby continues to feed well. Breastfeeding discharge reviewed. Discussed length and frequency of feeds, and how to know baby is getting enough at breast. Reviewed comfort measures for engorgement. Encouraged mom to call with any questions or concerns.

## 2023-01-17 NOTE — ANESTHESIA PROCEDURE NOTES
Epidural Block    Patient location during procedure: OB  Reason for block: labor epidural  Staffing  Performed: resident/CRNA   Anesthesiologist: Monica Beatty MD  Resident/CRNA: ABBEY Foss - SMITA  Epidural  Patient position: sitting  Prep: Betadine  Patient monitoring: continuous pulse ox and frequent blood pressure checks  Approach: midline  Location: L3-4  Injection technique: ALANA air and ALANA saline  Provider prep: mask and sterile gloves  Needle  Needle type: Tuohy   Needle gauge: 17 G  Needle length: 3.5 in  Needle insertion depth: 8 cm  Catheter type: side hole  Catheter size: 19 G  Catheter at skin depth: 14 cm  Test dose: negativeCatheter Secured: tegaderm and tape  Assessment  Sensory level: T10  Hemodynamics: stable  Attempts: 2  Outcomes: patient tolerated procedure well  Additional Notes  Patient needing multiple coachings to keep position  Preanesthetic Checklist  Completed: patient identified, IV checked, risks and benefits discussed, surgical/procedural consents, equipment checked, pre-op evaluation, timeout performed, anesthesia consent given, oxygen available, monitors applied/VS acknowledged, fire risk safety assessment completed and verbalized and blood product R/B/A discussed and consented

## 2023-01-17 NOTE — PLAN OF CARE
Problem: Safety - Adult  Goal: Free from fall injury  Outcome: Progressing     Problem: Pain  Goal: Verbalizes/displays adequate comfort level or baseline comfort level  Outcome: Progressing     Problem: Postpartum  Goal: Experiences normal postpartum course  Description:  Postpartum OB-Pregnancy care plan goal which identifies if the mother is experiencing a normal postpartum course  Outcome: Progressing  Goal: Appropriate maternal -  bonding  Description:  Postpartum OB-Pregnancy care plan goal which identifies if the mother and  are bonding appropriately  Outcome: Progressing  Goal: Establishment of infant feeding pattern  Description:  Postpartum OB-Pregnancy care plan goal which identifies if the mother is establishing a feeding pattern with their   Outcome: Progressing

## 2023-01-18 LAB
BLD PROD TYP BPU: NORMAL
STATUS OF UNITS: NORMAL
TRANSFUSION STATUS: NORMAL
UNIT DIVISION: 0
UNIT NUMBER: NORMAL

## 2023-01-18 NOTE — DISCHARGE INSTRUCTIONS
Follow-up with your OB doctor in 2 weeks or as specified by your physician. Please refer to A New Beginning  book provided in your room. Please take this book home with you to refer to. For Breastfeeding moms, you can contact our lactation specialists with any problems or questions you may have. Phone number [phone] Feel free to leave voice mail and your call will be returned as soon as possible. DIET  Eat a well balanced diet focusing on foods high in fiber and protein. Drink 8-10 glasses of fluids daily, especially water. To avoid constipation you may take a mild stool softener as recommended by your doctor or midwife. ACTIVITY  Gradually increase your activity. Resume exercise regimen only after advise by your doctor or midwife. Avoid lifting anything heavier than your baby or a gallon of milk for SIX weeks. Avoid driving 1 week for vaginal delivery and 2 weeks for  section, unless otherwise instructed by physician. Rise slowly from a lying to sitting and then a standing position. Climb stairs carefully. Use caution when carrying your baby up and down the stairs. NO SEXUAL Activity for 6 weeks or until advised by your doctor; Nothing in vagina: intercourse, tampons, or douching. No swimming or hot tubs. Be prepared to discuss family planning at your follow-up OB visit. You may feel tired or have a lack of energy. You may continue your prenatal vitamin to replenish nutrients post delivery. Nap when baby naps to catch up on sleep. EMOTIONS  You may feel [place]ira, sad, teary, & overwhelmed for the first 2 weeks postpartum. Contact your OB provider if you feel you may be showing signs of postpartum depression, or have thoughts of harming yourself or your infant. If infant will not stop crying, contact another adult for help or place infant in their crib on their back and take a break. NEVER shake your infant.       BLEEDING  Vaginal bleeding will decrease in amount over the next few weeks. You will notice that as your activity increases, your flow may increase. This is your body's way of telling you, you need take things easier and rest more often. Call your OB/ER if you are saturating more than one maxi pad in an hour & resting does help. BREAST CARE  Take medications as recommended by your doctor or midwife for pain  If you develop a warm, red, tender area on your breast or develop a fever contact your lactation consultant. For breastfeeding moms:  If you become engorged, feeding may be more difficult or painful for 1-2 days. You may find it helpful to hand express some milk so that the infant can latch on more easily. While breastfeeding, continue to take your prenatal vitamins as directed by your doctor or midwife. Refer to the breastfeeding booklet in the  folder/binder for more information. For any questions or concerns contact a Lactation Consultant. Leave a message and your call will be returned. For NON-breastfeeding moms:  You may apply ice packs to your breasts over you bra for twenty minutes at a time for comfort. Cabbage leaves may be applied to breasts, replace when wilted. Avoid stimulation to your breasts, when showering allow the water to strike your back not your breasts. Do not express milk or your body will make more. Wear a good fitting bra until your milk dries, such as a sports bra. CLAY CARE  Shower daily, perineum with mild soap. Use the clay-bottle until bleeding stops each time you use the restroom. Cleanse your perineum from front to back. If used, stitches will dissolve in 4-6 weeks. You may use a sitz bath or soak in a clean tub with drain open and water running for comfort. Kegel exercises will help restore bladder control. SWELLING  Try to keep your legs elevated when you are sitting. When lying down keep your legs elevated. WHEN TO CALL THE DOCTOR  If you have a temp of 100.4or more.    If your bleeding has increased and you are saturating a pad in an hour. Your abdomen is tender to touch. You are passing blood clots bigger than the size of a lemon. If you are experiencing extreme weakness or dizziness. If you are having flu-like symptoms such as achy muscles or joints. There is a foul smell or a green color to your vaginal bleeding. If you have pain that cannot be relieved. You have persistent burning with urination or frequency. Call if you have concerns about your well-being. You are unable to sleep, eat, or are having thoughts of harming yourself or your baby. You have a red, warm, tender area in you calf. Signs of a breast infection red painful breasts.

## 2023-01-18 NOTE — PROGRESS NOTES
RN called  to notify of large clot pt passed, fundus firm 1 below umbilicus, asymptomatic vital signs stable (see flowsheet). No further orders at this time.   Electronically signed by Jose Manuel Jeter RN on 1/17/2023 at 11:09 PM

## 2023-01-18 NOTE — PLAN OF CARE
Problem: Safety - Adult  Goal: Free from fall injury  2023 by Jaziel Haywood RN  Outcome: Completed  2023 by Will Degroot RN  Outcome: Progressing     Problem: Pain  Goal: Verbalizes/displays adequate comfort level or baseline comfort level  2023 by Jaziel Haywood RN  Outcome: Completed  2023 by Will Degroot RN  Outcome: Progressing     Problem: Postpartum  Goal: Experiences normal postpartum course  Description:  Postpartum OB-Pregnancy care plan goal which identifies if the mother is experiencing a normal postpartum course  2023 by Jaziel Haywood RN  Outcome: Completed  2023 by Will Degroot RN  Outcome: Progressing  Goal: Appropriate maternal -  bonding  Description:  Postpartum OB-Pregnancy care plan goal which identifies if the mother and  are bonding appropriately  2023 by Jaziel Haywood RN  Outcome: Completed  2023 by Will Degroot RN  Outcome: Progressing  Goal: Establishment of infant feeding pattern  Description:  Postpartum OB-Pregnancy care plan goal which identifies if the mother is establishing a feeding pattern with their   2023 by Jaziel Haywood RN  Outcome: Completed  2023 by Will Degroot RN  Outcome: Progressing

## 2023-01-30 ENCOUNTER — POSTPARTUM VISIT (OUTPATIENT)
Dept: OBGYN CLINIC | Age: 23
End: 2023-01-30

## 2023-01-30 VITALS
WEIGHT: 264.8 LBS | BODY MASS INDEX: 41.47 KG/M2 | DIASTOLIC BLOOD PRESSURE: 81 MMHG | SYSTOLIC BLOOD PRESSURE: 121 MMHG | HEART RATE: 105 BPM

## 2023-01-30 DIAGNOSIS — O09.299 HISTORY OF PRE-ECLAMPSIA IN PRIOR PREGNANCY, CURRENTLY PREGNANT: ICD-10-CM

## 2023-01-30 DIAGNOSIS — Z3A.39 39 WEEKS GESTATION OF PREGNANCY: Primary | ICD-10-CM

## 2023-01-30 DIAGNOSIS — Z67.11 TYPE A BLOOD, RH NEGATIVE: ICD-10-CM

## 2023-01-30 PROCEDURE — 99024 POSTOP FOLLOW-UP VISIT: CPT | Performed by: STUDENT IN AN ORGANIZED HEALTH CARE EDUCATION/TRAINING PROGRAM

## 2023-01-30 NOTE — PROGRESS NOTES
Postpartum Visit    Rock Pretty is a 20 y.o. female , 2 weeks Post Partum s/p  on 23    The patient was seen. Her pregnancy was complicated by gHTN and BMI 45. She is  breast feeding and denies signs or symptoms of mastitis. The patient completed the E.P.D.S. Post Partum Depression Evaluation form and scored 0. She does not have signs or symptoms of post partum depression. She denies any suicidal thoughts with a plan, intent to harm others, and delusional ideas. She does admit to having good home support. Today her lochia is light she denies any dizziness or shortness of breath. Her bowels are regular and she denies any urinary tract symptomology. She is using abstinence for family planning and for STD prevention. OB History    Para Term  AB Living   2 2 2 0 0 2   SAB IAB Ectopic Molar Multiple Live Births   0 0 0 0 0 2     Patient Active Problem List   Diagnosis    Fam H/O DM (pt's mom)    BMI 45    H/O headaches    Rh negative state    History of heavy periods    SMA carrier status    Tachycardia    Hx Pre-E (G1)    Elevated Early 1 hr, normal 3 hr GTT. High-risk pregnancy in third trimester    Gestational hypertension (G2)     23 F Apg 8/9 Wt 7#13       Vitals:   Blood pressure 121/81, pulse (!) 105, weight 264 lb 12.8 oz (120.1 kg), last menstrual period 04/15/2022, currently breastfeeding. Physical Exam:  General:  no apparent distress, alert and cooperative  Lungs:  No increased work of breathing, good air exchange, clear to auscultation bilaterally, no crackles or wheezing  Heart:  regular rate and rhythm and no murmur    Abdomen: Abdomen soft, non-tender.  BS normal. No masses,  No organomegaly  Fundus: non-tender, normal size, firm, below umbilicus  Perineum: not inspected  Extremities:  no calf tenderness, non edematous    Assessment:  Rock Pretty is a 20 y.o. female , 2 weeks Post Partum s/p  on 23   - Stable, continue routine post partum care    Patient Active Problem List    Diagnosis Date Noted     23 F Apg 8/9 Wt 7#13 2023     Priority: High    Gestational hypertension (G2) 2023     Priority: Medium    High-risk pregnancy in third trimester 01/15/2023     Priority: Medium    Elevated Early 1 hr, normal 3 hr GTT. 2022     Priority: Medium     Repeat 3 hr WNL      Hx Pre-E (G1) 2022     Priority: Medium     Baby ASA @ 12 wks      Tachycardia 2021     And possible presyncopal episode, suspect POTS  Baseline EKG wnl 21      SMA carrier status 2021     SMN1+ carrier  Negative FOB testing      History of heavy periods 2021     OCPs in the past caused worsening acne and weight gain      Rh negative state 2021             Fam H/O DM (pt's mom) 2020     Early 1h       BMI 45 2020      testing 34 weeks      H/O headaches 2020     Usually treats with advil outside of pregnancy       Return in about 4 weeks (around 2023) for PP Visit. Counseling Completed:  Signs & Symptoms of mastitis and when to notify office discussed.   Secondary smoke risks including increased risks of respiratory problems, Sudden infant death syndrome, and potential malignancies discussed  Abstinence, family planning counseling and STD counseling discussed  Continue with post operative restrictions until 6 weeks post partum  No heavy lifting or [place] until 6 weeks post partum    DO Ashley Tillman Ob/Gyn   2023, 10:17 AM

## 2023-02-20 ENCOUNTER — OFFICE VISIT (OUTPATIENT)
Dept: PRIMARY CARE CLINIC | Age: 23
End: 2023-02-20

## 2023-02-20 VITALS
TEMPERATURE: 99 F | BODY MASS INDEX: 41.35 KG/M2 | HEART RATE: 92 BPM | OXYGEN SATURATION: 96 % | DIASTOLIC BLOOD PRESSURE: 84 MMHG | WEIGHT: 264 LBS | SYSTOLIC BLOOD PRESSURE: 118 MMHG

## 2023-02-20 DIAGNOSIS — R11.2 NAUSEA AND VOMITING, UNSPECIFIED VOMITING TYPE: ICD-10-CM

## 2023-02-20 DIAGNOSIS — J02.9 SORE THROAT: Primary | ICD-10-CM

## 2023-02-20 DIAGNOSIS — J02.0 STREP PHARYNGITIS: ICD-10-CM

## 2023-02-20 LAB — S PYO AG THROAT QL: POSITIVE

## 2023-02-20 RX ORDER — AMOXICILLIN 875 MG/1
875 TABLET, COATED ORAL 2 TIMES DAILY
Qty: 20 TABLET | Refills: 0 | Status: SHIPPED | OUTPATIENT
Start: 2023-02-20 | End: 2023-02-21

## 2023-02-20 NOTE — PROGRESS NOTES
Subjective:  Lurdes  presents for   Chief Complaint   Patient presents with    Cough    Pharyngitis    Emesis     Woke up satruday with sore thorat    Cough is minimal vomiting is associated with this    Fluids are good     Not eating    No difficulty breathing    Hurts a lot to swallow    No fevers, did have some chills    Patient Active Problem List   Diagnosis    Fam H/O DM (pt's mom)    BMI 45    H/O headaches    Rh negative state    History of heavy periods    SMA carrier status    Tachycardia    Hx Pre-E (G1)    Elevated Early 1 hr, normal 3 hr GTT. High-risk pregnancy in third trimester    Gestational hypertension (G2)     23 F Apg 8/9 Wt 7#13       Objective:  Physical Exam   Vitals:  Vitals:    23 0932   BP: 118/84   Pulse: 92   Temp: 99 °F (37.2 °C)   SpO2: 96%       Wt Readings from Last 3 Encounters:   23 264 lb (119.7 kg)   23 264 lb 12.8 oz (120.1 kg)   23 291 lb 9.6 oz (132.3 kg)     Ht Readings from Last 3 Encounters:   22 5' 7\" (1.702 m)   10/19/22 5' 7\" (1.702 m)   22 5' 7\" (1.702 m)     Body mass index is 41.35 kg/m². Vitals:    23 0932   Weight: 264 lb (119.7 kg)       Constitutional: She is oriented to person, place, and time. She appears well-developed and well-nourished and in no acute distress. Answers all my questions appropriately. Head: Normocephalic and atraumatic. Eyes:conjunctiva appear normal.    Right Ear: External ear normal. TM is clear  Left Ear: External ear normal. TM is clear    Nose: pink, non-edematous mucosa. No polyps. No septal deviation    Throat: significnat erythema, 3-4+ tonsils with lots of exudate present. No obvious abscess could be seen  No ulcerations noted. Lips/Teeth/Gums all appear normal.    Neck: Normal range of motion. Neck supple. No tracheal deviation present. No abnormal lymphadenopathy. No JVD noted. Carotids are clear bilaterally. No thyroid masses noted.     Heart: RRR without murmur. No S3, S4, or gallop noted. Chest:   Good breath sounds noted. Clear to auscultation bilaterally. No rales, wheezes, or rhonchi noted. No respiratory retractions noted. Wall has symmetrical movement with respirations. Poct strep - POSITIVE    Assessment:   Encounter Diagnoses   Name Primary? Sore throat Yes    Nausea and vomiting, unspecified vomiting type     Strep pharyngitis          Plan:     Push fluids    Take tyelnol/motirn to contorl pain    There are no discontinued medications. THE ABOVE NOTED DISCONTINUED MEDS MAY ONLY BE FROM 'CLEANING UP' THE MED LIST AND WERE NOT ACTUALLY CANCELED;  SEE CHART FOR DETAILS! Orders Placed This Encounter   Medications    amoxicillin (AMOXIL) 875 MG tablet     Sig: Take 1 tablet by mouth 2 times daily for 10 days     Dispense:  20 tablet     Refill:  0     Orders Placed This Encounter   Procedures    POCT rapid strep A     Return in about 2 weeks (around 3/6/2023). There are no Patient Instructions on file for this visit.   Follow up with your provider

## 2023-02-21 ENCOUNTER — APPOINTMENT (OUTPATIENT)
Dept: CT IMAGING | Age: 23
End: 2023-02-21
Payer: COMMERCIAL

## 2023-02-21 ENCOUNTER — HOSPITAL ENCOUNTER (EMERGENCY)
Age: 23
Discharge: HOME | End: 2023-02-21
Attending: EMERGENCY MEDICINE
Payer: COMMERCIAL

## 2023-02-21 ENCOUNTER — TELEPHONE (OUTPATIENT)
Dept: PRIMARY CARE CLINIC | Age: 23
End: 2023-02-21

## 2023-02-21 VITALS
SYSTOLIC BLOOD PRESSURE: 131 MMHG | DIASTOLIC BLOOD PRESSURE: 94 MMHG | RESPIRATION RATE: 18 BRPM | HEIGHT: 67 IN | BODY MASS INDEX: 40.81 KG/M2 | HEART RATE: 99 BPM | OXYGEN SATURATION: 98 % | WEIGHT: 260 LBS | TEMPERATURE: 98.7 F

## 2023-02-21 DIAGNOSIS — J18.9 PNEUMONIA DUE TO INFECTIOUS ORGANISM, UNSPECIFIED LATERALITY, UNSPECIFIED PART OF LUNG: ICD-10-CM

## 2023-02-21 DIAGNOSIS — J02.0 STREPTOCOCCAL SORE THROAT: Primary | ICD-10-CM

## 2023-02-21 LAB
ABSOLUTE EOS #: 0.3 K/UL (ref 0–0.4)
ABSOLUTE LYMPH #: 1.1 K/UL (ref 1–4.8)
ABSOLUTE MONO #: 1 K/UL (ref 0.1–1.2)
ALBUMIN SERPL-MCNC: 3.8 G/DL (ref 3.5–5.2)
ALBUMIN/GLOBULIN RATIO: 1.2 (ref 1–2.5)
ALP SERPL-CCNC: 124 U/L (ref 35–104)
ALT SERPL-CCNC: 32 U/L (ref 5–33)
ANION GAP SERPL CALCULATED.3IONS-SCNC: 14 MMOL/L (ref 9–17)
AST SERPL-CCNC: 23 U/L
BASOPHILS # BLD: 0 % (ref 0–2)
BASOPHILS ABSOLUTE: 0 K/UL (ref 0–0.2)
BILIRUB SERPL-MCNC: 0.5 MG/DL (ref 0.3–1.2)
BUN SERPL-MCNC: 10 MG/DL (ref 6–20)
CALCIUM SERPL-MCNC: 9.3 MG/DL (ref 8.6–10.4)
CHLORIDE SERPL-SCNC: 109 MMOL/L (ref 98–107)
CO2 SERPL-SCNC: 20 MMOL/L (ref 20–31)
CREAT SERPL-MCNC: 0.68 MG/DL (ref 0.5–0.9)
EOSINOPHILS RELATIVE PERCENT: 2 % (ref 1–4)
GFR SERPL CREATININE-BSD FRML MDRD: >60 ML/MIN/1.73M2
GLUCOSE SERPL-MCNC: 90 MG/DL (ref 70–99)
HCT VFR BLD AUTO: 35.4 % (ref 36–46)
HGB BLD-MCNC: 11.1 G/DL (ref 12–16)
LIPASE SERPL-CCNC: 13 U/L (ref 13–60)
LYMPHOCYTES # BLD: 8 % (ref 24–44)
MAGNESIUM SERPL-MCNC: 1.9 MG/DL (ref 1.6–2.6)
MCH RBC QN AUTO: 22.1 PG (ref 26–34)
MCHC RBC AUTO-ENTMCNC: 31.2 G/DL (ref 31–37)
MCV RBC AUTO: 70.9 FL (ref 80–100)
MONOCYTES # BLD: 7 % (ref 2–11)
MONONUCLEOSIS SCREEN: NEGATIVE
PDW BLD-RTO: 19.8 % (ref 12.5–15.4)
PLATELET # BLD AUTO: 246 K/UL (ref 140–450)
PMV BLD AUTO: 8.9 FL (ref 6–12)
POTASSIUM SERPL-SCNC: 3.2 MMOL/L (ref 3.7–5.3)
PROT SERPL-MCNC: 7.1 G/DL (ref 6.4–8.3)
RBC # BLD: 5 M/UL (ref 4–5.2)
SARS-COV-2 RDRP RESP QL NAA+PROBE: NOT DETECTED
SEG NEUTROPHILS: 83 % (ref 36–66)
SEGMENTED NEUTROPHILS ABSOLUTE COUNT: 12.2 K/UL (ref 1.8–7.7)
SODIUM SERPL-SCNC: 143 MMOL/L (ref 135–144)
SPECIMEN DESCRIPTION: NORMAL
WBC # BLD AUTO: 14.6 K/UL (ref 3.5–11)

## 2023-02-21 PROCEDURE — 2580000003 HC RX 258: Performed by: NURSE PRACTITIONER

## 2023-02-21 PROCEDURE — 6360000004 HC RX CONTRAST MEDICATION: Performed by: EMERGENCY MEDICINE

## 2023-02-21 PROCEDURE — 96374 THER/PROPH/DIAG INJ IV PUSH: CPT

## 2023-02-21 PROCEDURE — 99285 EMERGENCY DEPT VISIT HI MDM: CPT

## 2023-02-21 PROCEDURE — 83690 ASSAY OF LIPASE: CPT

## 2023-02-21 PROCEDURE — 96361 HYDRATE IV INFUSION ADD-ON: CPT

## 2023-02-21 PROCEDURE — 70491 CT SOFT TISSUE NECK W/DYE: CPT

## 2023-02-21 PROCEDURE — 2580000003 HC RX 258: Performed by: EMERGENCY MEDICINE

## 2023-02-21 PROCEDURE — 96375 TX/PRO/DX INJ NEW DRUG ADDON: CPT

## 2023-02-21 PROCEDURE — 36415 COLL VENOUS BLD VENIPUNCTURE: CPT

## 2023-02-21 PROCEDURE — 6360000002 HC RX W HCPCS: Performed by: NURSE PRACTITIONER

## 2023-02-21 PROCEDURE — 83735 ASSAY OF MAGNESIUM: CPT

## 2023-02-21 PROCEDURE — 86308 HETEROPHILE ANTIBODY SCREEN: CPT

## 2023-02-21 PROCEDURE — 80053 COMPREHEN METABOLIC PANEL: CPT

## 2023-02-21 PROCEDURE — 87635 SARS-COV-2 COVID-19 AMP PRB: CPT

## 2023-02-21 PROCEDURE — 6370000000 HC RX 637 (ALT 250 FOR IP): Performed by: NURSE PRACTITIONER

## 2023-02-21 PROCEDURE — 85025 COMPLETE CBC W/AUTO DIFF WBC: CPT

## 2023-02-21 RX ORDER — ONDANSETRON 2 MG/ML
4 INJECTION INTRAMUSCULAR; INTRAVENOUS ONCE
Status: COMPLETED | OUTPATIENT
Start: 2023-02-21 | End: 2023-02-21

## 2023-02-21 RX ORDER — DEXAMETHASONE 4 MG/1
10 TABLET ORAL ONCE
Status: COMPLETED | OUTPATIENT
Start: 2023-02-21 | End: 2023-02-21

## 2023-02-21 RX ORDER — AMOXICILLIN AND CLAVULANATE POTASSIUM 875; 125 MG/1; MG/1
1 TABLET, FILM COATED ORAL 2 TIMES DAILY
Qty: 20 TABLET | Refills: 0 | Status: SHIPPED | OUTPATIENT
Start: 2023-02-21 | End: 2023-03-03

## 2023-02-21 RX ORDER — 0.9 % SODIUM CHLORIDE 0.9 %
1000 INTRAVENOUS SOLUTION INTRAVENOUS ONCE
Status: COMPLETED | OUTPATIENT
Start: 2023-02-21 | End: 2023-02-21

## 2023-02-21 RX ORDER — AZITHROMYCIN 250 MG/1
TABLET, FILM COATED ORAL
Qty: 1 PACKET | Refills: 0 | Status: SHIPPED | OUTPATIENT
Start: 2023-02-21 | End: 2023-02-25

## 2023-02-21 RX ORDER — 0.9 % SODIUM CHLORIDE 0.9 %
80 INTRAVENOUS SOLUTION INTRAVENOUS ONCE
Status: DISCONTINUED | OUTPATIENT
Start: 2023-02-21 | End: 2023-02-21 | Stop reason: HOSPADM

## 2023-02-21 RX ORDER — SODIUM CHLORIDE 0.9 % (FLUSH) 0.9 %
10 SYRINGE (ML) INJECTION PRN
Status: DISCONTINUED | OUTPATIENT
Start: 2023-02-21 | End: 2023-02-21 | Stop reason: HOSPADM

## 2023-02-21 RX ORDER — KETOROLAC TROMETHAMINE 15 MG/ML
15 INJECTION, SOLUTION INTRAMUSCULAR; INTRAVENOUS ONCE
Status: COMPLETED | OUTPATIENT
Start: 2023-02-21 | End: 2023-02-21

## 2023-02-21 RX ADMIN — SODIUM CHLORIDE, PRESERVATIVE FREE 10 ML: 5 INJECTION INTRAVENOUS at 14:40

## 2023-02-21 RX ADMIN — DEXAMETHASONE 10 MG: 4 TABLET ORAL at 15:57

## 2023-02-21 RX ADMIN — IOPAMIDOL 75 ML: 755 INJECTION, SOLUTION INTRAVENOUS at 14:40

## 2023-02-21 RX ADMIN — ONDANSETRON 4 MG: 2 INJECTION INTRAMUSCULAR; INTRAVENOUS at 13:56

## 2023-02-21 RX ADMIN — Medication 80 ML: at 14:39

## 2023-02-21 RX ADMIN — POTASSIUM BICARBONATE 40 MEQ: 782 TABLET, EFFERVESCENT ORAL at 15:20

## 2023-02-21 RX ADMIN — KETOROLAC TROMETHAMINE 15 MG: 15 INJECTION, SOLUTION INTRAMUSCULAR; INTRAVENOUS at 15:57

## 2023-02-21 RX ADMIN — SODIUM CHLORIDE 1000 ML: 9 INJECTION, SOLUTION INTRAVENOUS at 13:56

## 2023-02-21 ASSESSMENT — ENCOUNTER SYMPTOMS
CHEST TIGHTNESS: 0
SINUS PRESSURE: 0
SINUS PAIN: 0
EYES NEGATIVE: 1
SORE THROAT: 1
SHORTNESS OF BREATH: 0
FACIAL SWELLING: 0
VOICE CHANGE: 0
GASTROINTESTINAL NEGATIVE: 1
TROUBLE SWALLOWING: 0
COUGH: 1
WHEEZING: 0
RHINORRHEA: 0
STRIDOR: 0
APNEA: 0
CHOKING: 0

## 2023-02-21 ASSESSMENT — PAIN SCALES - GENERAL
PAINLEVEL_OUTOF10: 4
PAINLEVEL_OUTOF10: 8
PAINLEVEL_OUTOF10: 8

## 2023-02-21 ASSESSMENT — PAIN DESCRIPTION - LOCATION: LOCATION: THROAT

## 2023-02-21 ASSESSMENT — PAIN - FUNCTIONAL ASSESSMENT: PAIN_FUNCTIONAL_ASSESSMENT: 0-10

## 2023-02-21 NOTE — ED PROVIDER NOTES
[facility] Emergency Department  [address] [address] [facility] [address] [facility] [address]  Phone: [phone]  Fax: [phone]        Pt Name: Karolina Dinh  MRN: [id number]  [place] 2000  Date of evaluation: 2/21/23    CHIEFCOMPLAINT       Chief Complaint   Patient presents with    Pharyngitis    Nausea     Pt states she was seen at the walk in clinic in [place] yesterday and was started on antibiotics for positive strep swab. HISTORY OF PRESENT ILLNESS (Location/Symptom, Timing/Onset, Context/Setting, Quality, Duration, Modifying Factors, Severity)      Karolina Dinh is a 20 y.o. female with no pertinent PMH who presents to the ED via private auto with complaints of a sore throat, body aches, nausea and vomiting which started on Monday. Patient was diagnosed with strep throat at a walk-in clinic and was started on amoxicillin. She has taken 4 doses of the amoxicillin. She states that she started to have nausea and vomiting yesterday. She does have young children at home, and just feels weak and dehydrated. She states that she is able to swallow and has not been drooling, but it is painful to do so. Potato voice is noted. She denies any chest pain or shortness of breath. Denies any headache dizziness or lightheadedness. Denies any abdominal pain at this time. PAST MEDICAL / SURGICAL / SOCIAL / FAMILY HISTORY     PMH:  has a past medical history of Hypertension and Pre-eclampsia. Surgical History:  has a past surgical history that includes [place] tooth extraction. Social History:  reports that she has never smoked. She has never used smokeless tobacco. She reports that she does not currently use alcohol. She reports that she does not use drugs. Family History: She indicated that her mother is alive. She indicated that her father is alive. She indicated that her sister is alive. She indicated that her maternal grandmother is alive.  She indicated that her maternal grandfather is alive. She indicated that her paternal grandmother is alive. She indicated that her paternal grandfather is alive. She indicated that the status of her neg hx is unknown.   family history includes Diabetes type 2  in her mother; No Known Problems in her father, maternal grandfather, maternal grandmother, paternal grandfather, paternal grandmother, and sister. Psychiatric History: None    Allergies: Bee venom    Home Medications:   Prior to Admission medications    Medication Sig Start Date End Date Taking? Authorizing Provider   amoxicillin-clavulanate (AUGMENTIN) 875-125 MG per tablet Take 1 tablet by mouth 2 times daily for 10 days 2/21/23 3/3/23 Yes ABBEY Paige NP   azithromycin (ZITHROMAX Z-DAIJA) 250 MG tablet Take 2 tablets (500 mg) on Day 1, and then take 1 tablet (250 mg) on days 2 through 5. 2/21/23 2/25/23 Yes ABBEY Paige NP   ibuprofen (ADVIL;MOTRIN) 600 MG tablet Take 1 tablet by mouth every 6 hours as needed for Pain 1/16/23   Deleta , DO   docusate sodium (COLACE) 100 MG capsule Take 1 capsule by mouth 2 times daily 1/16/23 3/17/23  Deleta , DO   Prenatal Vit-Fe Fumarate-FA (PRENATAL VITAMINS PO) Take 1 tablet by mouth 4/14/22   Historical Provider, MD   aspirin (ASPIRIN CHILDRENS) 81 MG chewable tablet Take 1 tablet by mouth daily 7/11/22   Dori Mackenzie MD   EPINEPHrine (EPIPEN) 0.3 MG/0.3ML SOAJ injection Inject 0.3 mLs into the muscle once for 1 dose Use as directed for allergic reaction 6/6/18 2/18/21  ABBEY Tuttle - CNP       REVIEW OF SYSTEMS  (2-9 systems for level 4, 10 ormore for level 5)      Review of Systems   Constitutional:  Positive for fatigue. Negative for activity change, appetite change, chills, diaphoresis, fever and unexpected weight change. HENT:  Positive for congestion and sore throat.  Negative for dental problem, drooling, ear discharge, ear pain, facial swelling, hearing loss, mouth sores, nosebleeds, postnasal drip, rhinorrhea, sinus pressure, sinus pain, sneezing, tinnitus, trouble swallowing and voice change. Eyes: Negative. Respiratory:  Positive for cough. Negative for apnea, choking, chest tightness, shortness of breath, wheezing and stridor. Cardiovascular: Negative. Gastrointestinal: Negative. Endocrine: Negative. Genitourinary: Negative. Musculoskeletal: Negative. Skin: Negative. Neurological: Negative. Hematological: Negative. Psychiatric/Behavioral: Negative. All other systems negative except as marked. PHYSICAL EXAM  (up to 7 for level 4, 8 or more for level 5)      INITIAL VITALS:  height is 5' 7\" (1.702 m) and weight is 117.9 kg (260 lb). Her oral temperature is 98.7 °F (37.1 °C). Her blood pressure is 132/85 and her pulse is 98. Her respiration is 18 and oxygen saturation is 97%. Vital signs reviewed. Physical Exam  Constitutional:       Appearance: She is well-developed. HENT:      Head: Normocephalic. Right Ear: Tympanic membrane and ear canal normal. No tenderness. No middle ear effusion. Tympanic membrane is not erythematous. Left Ear: Tympanic membrane and ear canal normal. No tenderness. No middle ear effusion. Tympanic membrane is not erythematous. Nose: Congestion present. No rhinorrhea. Mouth/Throat:      Mouth: Mucous membranes are moist.      Pharynx: Uvula midline. Pharyngeal swelling, oropharyngeal exudate and posterior oropharyngeal erythema present. No uvula swelling. Tonsils: Tonsillar exudate present. No tonsillar abscesses. 3+ on the right. 3+ on the left. Eyes:      Conjunctiva/sclera: Conjunctivae normal.      Pupils: Pupils are equal, round, and reactive to light. Cardiovascular:      Rate and Rhythm: Regular rhythm. Tachycardia present. Heart sounds: Normal heart sounds. Pulmonary:      Effort: Pulmonary effort is normal.      Breath sounds: Normal breath sounds.    Abdominal: General: Bowel sounds are normal. There is no distension. Palpations: Abdomen is soft. There is no mass. Tenderness: There is no abdominal tenderness. There is no rebound. Musculoskeletal:      Cervical back: Normal range of motion. Skin:     General: Skin is warm and dry. Capillary Refill: Capillary refill takes less than 2 seconds. Neurological:      Mental Status: She is alert and oriented to person, place, and time. Psychiatric:         Mood and Affect: Mood normal.         Behavior: Behavior normal.         DIFFERENTIAL DIAGNOSIS / MDM     On exam, patient is resting in her room with her infant daughter. She appears nontoxic and no distress is noted. Heart sounds within normal limits per auscultation. Lung sounds are clear and equal bilaterally. Bowel sounds present in all 4 quadrants. No abdominal distention tenderness or guarding is noted at this time. Patient is slightly nauseated. Upon examination the oropharynx, tonsils are quite swollen, 3+, exudate is noted, cervical lymphadenopathy is noted, worse on the left than the right. No neck rigidity noted. Patient is currently afebrile, but is tachycardic. She denies any chest pain or shortness of breath. I will order a CT of the soft tissue of the neck, CBC CMP lipase monoscreen as well as give patient some fluids and Zofran for her symptoms. CMP is showing a low potassium of 3.2, slightly elevated Phos at 124, white blood cell count 14.6, hemoglobin of 11.1, which is appear to be higher than her baseline. Monoscreen is negative. CT is showing few partially visualized groundglass opacities in the right lung concerning for pneumonia. Few prominent bilateral cervical lymph nodes that may be reactive are noted per radiologist read. Of the soft tissues, no appreciable soft tissue swelling or masses seen per radiologist read.     Given the CT showing possible groundglass opacities, I will add on a COVID screen, she was not tested for this at the walk-in clinic. I will also add on some p.o. Decadron and replace her potassium. Patient does report improvement of her nausea. I will add on some Toradol as well. All the results were discussed with the patient. She reports improvement of her symptoms and is currently tolerating p.o. I will switch her antibiotics to Augmentin and azithromycin. She is to follow-up with her primary care physician and follow-up with her OB in the next few days as scheduled. Return to the emergency department with any inability to swallow, drooling, fever does improve with antipyretics, intractable nausea vomiting, severe abdominal pain, or any other new concerning or worsening symptoms. Patient states understanding of education and stable for outpatient follow-up at this time. PLAN (LABS / IMAGING / EKG):  Orders Placed This Encounter   Procedures    COVID-19, Rapid    CT SOFT TISSUE NECK W CONTRAST    CBC with Auto Differential    Comprehensive Metabolic Panel w/ Reflex to MG    Lipase    Mononucleosis Screen    Magnesium    Vital signs       MEDICATIONS ORDERED:  Orders Placed This Encounter   Medications    0.9 % sodium chloride bolus    ondansetron (ZOFRAN) injection 4 mg    sodium chloride flush 0.9 % injection 10 mL    0.9 % sodium chloride bolus    iopamidol (ISOVUE-370) 76 % injection 75 mL    potassium bicarb-citric acid (EFFER-K) effervescent tablet 40 mEq    ketorolac (TORADOL) injection 15 mg    dexamethasone (DECADRON) tablet 10 mg    amoxicillin-clavulanate (AUGMENTIN) 875-125 MG per tablet     Sig: Take 1 tablet by mouth 2 times daily for 10 days     Dispense:  20 tablet     Refill:  0    azithromycin (ZITHROMAX Z-DAIJA) 250 MG tablet     Sig: Take 2 tablets (500 mg) on Day 1, and then take 1 tablet (250 mg) on days 2 through 5.      Dispense:  1 packet     Refill:  0       Controlled Substances Monitoring:     DIAGNOSTIC RESULTS     EKG: All EKG's are interpreted by the Emergency Department Physician who either signs or Co-signs this chart in the [address] a cardiologist.      RADIOLOGY: All images are read by the radiologist and their interpretations are reviewed. CT SOFT TISSUE NECK W CONTRAST   Final Result   Few partially visualized ground-glass opacities in the right lung concerning   for pneumonia. CT of the chest may be helpful in further evaluation. Few prominent bilateral cervical lymph nodes that may be reactive. CT SOFT TISSUE NECK W CONTRAST    Result Date: 2/21/2023  EXAMINATION: CT OF THE NECK SOFT TISSUE WITH CONTRAST  2/21/2023 TECHNIQUE: CT of the neck was performed with the administration of intravenous contrast. Multiplanar reformatted images are provided for review. Automated exposure control, iterative reconstruction, and/or weight based adjustment of the mA/kV was utilized to reduce the radiation dose to as low as reasonably achievable. COMPARISON: None. HISTORY: ORDERING SYSTEM PROVIDED HISTORY: Hx strep; adenopathy TECHNOLOGIST PROVIDED HISTORY: Hx strep; adenopathy Decision Support Exception - unselect if not a suspected or confirmed emergency medical condition->Emergency Medical Condition ([place]) Reason for Exam: Hx strep, pharyngitis, nausea. Pt declines to remove earrings. FINDINGS: PHARYNX/LARYNX:  The palatine tonsils are normal in appearance. The tongue is normal in appearance. The valleculae, epiglottis, aryepiglottic folds and pyriform sinuses appear unremarkable. The true and false vocal cords are normal in appearance. No mass or abscess is seen. SALIVARY GLANDS/THYROID:  The parotid and submandibular glands appear unremarkable. The thyroid gland appears unremarkable. LYMPH NODES:  There are few prominent cervical lymph nodes bilaterally with the largest on the left measuring approximately 2.9 x 1.4 cm and the largest on right measuring approximately 3.0 x 1.0 cm. SOFT TISSUES:  No appreciable soft tissue swelling or mass is seen. BRAIN/ORBITS/SINUSES:  The visualized portion of the intracranial contents appear unremarkable.  The visualized portion of the orbits, paranasal sinuses and mastoid air cells demonstrate no acute abnormality. LUNG APICES/SUPERIOR MEDIASTINUM:  There is partial visualization of ground-glass opacities in the right lung.  No superior mediastinal lymphadenopathy or mass.  The visualized portion of the trachea appears unremarkable. BONES:  No aggressive appearing lytic or blastic bony lesion.     Few partially visualized ground-glass opacities in the right lung concerning for pneumonia.  CT of the chest may be helpful in further evaluation. Few prominent bilateral cervical lymph nodes that may be reactive.       LABS:  Results for orders placed or performed during the hospital encounter of 02/21/23   COVID-19, Rapid    Specimen: Nasopharyngeal Swab   Result Value Ref Range    Specimen Description .NASOPHARYNGEAL SWAB     SARS-CoV-2, Rapid Not Detected Not Detected   CBC with Auto Differential   Result Value Ref Range    WBC 14.6 (H) 3.5 - 11.0 k/uL    RBC 5.00 4.0 - 5.2 m/uL    Hemoglobin 11.1 (L) 12.0 - 16.0 g/dL    Hematocrit 35.4 (L) 36 - 46 %    MCV 70.9 (L) 80 - 100 fL    MCH 22.1 (L) 26 - 34 pg    MCHC 31.2 31 - 37 g/dL    RDW 19.8 (H) 12.5 - 15.4 %    Platelets 246 140 - 450 k/uL    MPV 8.9 6.0 - 12.0 fL    Seg Neutrophils 83 (H) 36 - 66 %    Lymphocytes 8 (L) 24 - 44 %    Monocytes 7 2 - 11 %    Eosinophils % 2 1 - 4 %    Basophils 0 0 - 2 %    Segs Absolute 12.20 (H) 1.8 - 7.7 k/uL    Absolute Lymph # 1.10 1.0 - 4.8 k/uL    Absolute Mono # 1.00 0.1 - 1.2 k/uL    Absolute Eos # 0.30 0.0 - 0.4 k/uL    Basophils Absolute 0.00 0.0 - 0.2 k/uL   Comprehensive Metabolic Panel w/ Reflex to MG   Result Value Ref Range    Glucose 90 70 - 99 mg/dL    BUN 10 6 - 20 mg/dL    Creatinine 0.68 0.50 - 0.90 mg/dL    Est, Glom Filt Rate >60 >60 mL/min/1.73m2    Calcium 9.3 8.6 - 10.4 mg/dL    Sodium 143 135 - 144 mmol/L     Potassium 3.2 (L) 3.7 - 5.3 mmol/L    Chloride 109 (H) 98 - 107 mmol/L    CO2 20 20 - 31 mmol/L    Anion Gap 14 9 - 17 mmol/L    Alkaline Phosphatase 124 (H) 35 - 104 U/L    ALT 32 5 - 33 U/L    AST 23 <32 U/L    Total Bilirubin 0.5 0.3 - 1.2 mg/dL    Total Protein 7.1 6.4 - 8.3 g/dL    Albumin 3.8 3.5 - 5.2 g/dL    Albumin/Globulin Ratio 1.2 1.0 - 2.5   Lipase   Result Value Ref Range    Lipase 13 13 - 60 U/L   Mononucleosis Screen   Result Value Ref Range    Mononucleosis Screen NEGATIVE NEGATIVE   Magnesium   Result Value Ref Range    Magnesium 1.9 1.6 - 2.6 mg/dL       EMERGENCY DEPARTMENT COURSE           Vitals:    Vitals:    02/21/23 1248 02/21/23 1500 02/21/23 1615   BP: (!) 154/88 132/85    Pulse: (!) 120  98   Resp: 18     Temp: 98.7 °F (37.1 °C)     TempSrc: Oral     SpO2: 98% 97%    Weight: 117.9 kg (260 lb)     Height: 5' 7\" (1.702 m)       -------------------------  BP: 132/85, Temp: 98.7 °F (37.1 °C), Heart Rate: 98, Resp: 18      RE-EVALUATION:  See ED Course notes above. CONSULTS:  None    PROCEDURES:  None    FINAL IMPRESSION      1. Streptococcal sore throat    2. Pneumonia due to infectious organism, unspecified laterality, unspecified part of lung          DISPOSITION / PLAN     CONDITION ON DISPOSITION:   Good / Stable for discharge. PATIENT REFERRED TO:  ABBEY Ramirez NP  [address]  [phone]    Schedule an appointment as soon as possible for a visit       [facility] Emergency Department  [address] [facility] [address] [place] [address] [phone]  Go to   If symptoms worsen    DISCHARGE MEDICATIONS:  New Prescriptions    AMOXICILLIN-CLAVULANATE (AUGMENTIN) 875-125 MG PER TABLET    Take 1 tablet by mouth 2 times daily for 10 days    AZITHROMYCIN (ZITHROMAX Z-DAIJA) 250 MG TABLET    Take 2 tablets (500 mg) on Day 1, and then take 1 tablet (250 mg) on days 2 through 5.        ABBEY Huerta - NP   Emergency Medicine Nurse Practitioner    (Please note that portions of this note were completed with a voice recognition program.  Efforts were made to edit the dictations but occasionally words aremis-transcribed.)      ABBEY Jackson NP  02/21/23 9721

## 2023-02-21 NOTE — ED PROVIDER NOTES
[address] [facility] Emergency Department  [address] [address] [facility] [address] [facility] [address]  Phone: [phone]  Fax: [phone]      Attending Physician [address]       Chief Complaint   Patient presents with    Pharyngitis    Nausea     Pt states she was seen at the walk in clinic in [place] yesterday and was started on antibiotics for positive strep swab. DIAGNOSTIC RESULTS     LABS:  Labs Reviewed   CBC WITH AUTO DIFFERENTIAL   COMPREHENSIVE METABOLIC PANEL W/ REFLEX TO MG FOR LOW K   LIPASE   MONONUCLEOSIS SCREEN       All other labs were within normal range or not returned as of this dictation. RADIOLOGY:  CT SOFT TISSUE NECK W CONTRAST    (Results Pending)         EMERGENCY DEPARTMENT COURSE:   Vitals:    Vitals:    02/21/23 1248   BP: (!) 154/88   Pulse: (!) 120   Resp: 18   Temp: 98.7 °F (37.1 °C)   TempSrc: Oral   SpO2: 98%   Weight: 117.9 kg (260 lb)   Height: 5' 7\" (1.702 m)     -------------------------  BP: (!) 154/88, Temp: 98.7 °F (37.1 °C), Heart Rate: (!) 120, Resp: 18             PERTINENT ATTENDING PHYSICIAN COMMENTS:    I performed a history and physical examination of the patient and discussed management with the mid level provider. I reviewed the mid level provider's note and agree with the documented findings and plan of care. Any areas of disagreement are noted on the chart. I was personally present for the key portions of any procedures. I have documented in the chart those procedures where I was not present during the key portions. I have reviewed the emergency nurses triage note. I agree with the chief complaint, past medical history, past surgical history, allergies, medications, social and family history as documented unless otherwise noted below. Documentation of the HPI, Physical Exam and Medical Decision Making performed by mid level providers is based on my personal performance of the HPI, PE and MDM.  For Physician Assistant/ Nurse Practitioner cases/documentation I have personally evaluated this patient and have completed at least one if not all key elements of the E/M (history, physical exam, and MDM). Additional findings are as noted. 25-year-old female here with a sore throat. Recently diagnosed with strep pharyngitis. Patient is having difficulty swallowing. She has noticed a voice change. On physical exam, the patient has significant swelling of the tonsils, such that they are kissing with extensive exudates. She has tender cervical adenopathy, particularly on the left side. No significant asymmetry in the posterior pharynx. No trismus or pooling of secretions.     Plan for labs, fluids, CT soft tissue neck      (Please note that portions of this note were completed with a voice recognition program.  Efforts were made to edit the dictations but occasionally words are mis-transcribed.)    Finn Torres MD  Attending Emergency Medicine Physician       Finn Torres MD  02/21/23 1540

## 2023-02-21 NOTE — TELEPHONE ENCOUNTER
Per Jamie Myles NP she needs to go to the ER. I spoke with Emry Nissen, pts mom and let her know. She states that they are heading to [facility] ER now.

## 2023-02-21 NOTE — TELEPHONE ENCOUNTER
Pts mom called and spoke with Gamaliel wang MA at the office and states that she is unable to keep anything down, she can not swallow the antibiotic, she can hardly even talk. She is drinking little sips of Gatorade and water. 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 She is so miserable. Please advise. Thank you.

## 2023-02-22 ENCOUNTER — TELEPHONE (OUTPATIENT)
Dept: FAMILY MEDICINE CLINIC | Age: 23
End: 2023-02-22

## 2023-02-22 NOTE — TELEPHONE ENCOUNTER
ED Follow up Call      Reason for ED visit:  Pharyngitis    Status:     Feeling better today but still not feeling good    Did you call your PCP prior to going to the ED? Did you receive a discharge instructions from the Emergency Room? yes  Review of Instructions:     Understands what to report/when to return?:  yes   Understands discharge instructions?:  yes   Following discharge instructions?:  yes     If not why? Are there any new complaints of pain? no  New Pain Meds? no    Constipation prophylaxis needed? N/A    If you have a wound is the dressing clean, dry, and intact? Understands wound care regimen? N/A    Are there any other complaints/concerns that you wish to tell your provider? Next appt available is 03/24/2023 patient is requesting something sooner? Please advise     FU appts/Provider:    Future Appointments   Date Time Provider Torrie Amita   2/27/2023 10:15 AM Shruthi Lechuga MD [id number]           New Medications?:   Yes ATB therapy       Medication Reconciliation by phone - yes  Understands Medications? yes  Taking Medications? yes  Can you swallow your pills? yes    Any further needs in the home i.e. Equipment?   N/a    Link to services in community?:  n/a   Which services:

## 2023-02-23 ENCOUNTER — TELEPHONE (OUTPATIENT)
Dept: FAMILY MEDICINE CLINIC | Age: 23
End: 2023-02-23

## 2023-02-23 NOTE — TELEPHONE ENCOUNTER
ED follow up call was made. There was no availability until 03/24/2023. Patient refused that appt, requesting a sooner appt. This was sent to PCP to advise.

## 2023-02-23 NOTE — TELEPHONE ENCOUNTER
Called patient to schedule, and she stated someone already called her and scheduled. Nothing in chart to notify of this being done.

## 2023-02-23 NOTE — TELEPHONE ENCOUNTER
----- Message from Cecilia Scales sent at 2/22/2023  9:40 AM EST -----  Subject: Appointment Request    Reason for Call: Established Patient Appointment needed: Routine ED Follow   Up Visit    QUESTIONS    Reason for appointment request? No appointments available during search     Additional Information for Provider? Patient was seen in the ED on 2/21/22   and they stated that she has strep and Pneumonia and they Ed stated that   the patient need to be seen by PCP. There were no appts.  asap if the   office could give her a call to set up an appt.   ---------------------------------------------------------------------------  --------------  [address]  [phone]; OK to leave message on voicemail  ---------------------------------------------------------------------------  --------------  SCRIPT ANSWERS  COVID Screen: Ben Nino

## 2023-02-26 NOTE — PROGRESS NOTES
[address] [facility]  [facility] - [address] [facility] [address] [address]  Dept: [address] [place] [address]  2023  10:41 AM        Triston Sheppard is a 20 y.o. female       The patient was seen. She has no chief complaints today. She delivered vaginally on 23. She is  breast feeding and there is not any signs or symptoms of mastitis. She does not have any signs or symptoms of post partum depression. She denies any suicidal thoughts with a plan, intent to harm others, and delusional ideas. She says her bleeding stopped last week. Her pregnancy was complicated by:  Patient Active Problem List    Diagnosis Date Noted     21 F Apg 8/9 Wt 7#13 2023     Priority: High    Gestational hypertension (G2) 2023     Priority: Medium    High-risk pregnancy in third trimester 01/15/2023     Priority: Medium    Hx Pre-E (G1) 2022     Priority: Medium     Overview Note:     Baby ASA @ 12 wks      Elevated Early 1 hr, normal 3 hr GTT. 2022     Overview Note:     Repeat 3 hr WNL      Tachycardia 2021     Overview Note:     And possible presyncopal episode, suspect POTS  Baseline EKG wnl 21      SMA carrier status 2021     Overview Note:     SMN1+ carrier  Negative FOB testing      History of heavy periods 2021     Overview Note:     OCPs in the past caused worsening acne and weight gain      Rh negative state 2021     Overview Note:             Fam H/O DM (pt's mom) 2020     Overview Note:     Early 1h       BMI 45 2020     Overview Note:      testing 34 weeks      H/O headaches 2020     Overview Note:     Usually treats with advil outside of pregnancy         She does admit to having good home support.       OB History    Para Term  AB Living   2 2 2 0 0 2   SAB IAB Ectopic Molar Multiple Live Births   0 0 0 0 0 2 # Outcome Date GA Lbr Jarod/2nd Weight Sex Delivery Anes PTL Lv   2 Term 23 39w3d  7 lb 13.6 oz (3.56 kg) F Vag-Spont EPI  JOSEPH      Name: Bryson Spar: 8  Apgar5: 9   1 Term 21 39w2d  7 lb 8.6 oz (3.42 kg) F Vag-Spont EPI N JOSEPH      Name: Jony Eubankser: 8  Apgar5: 9       Patient Active Problem List   Diagnosis    Fam H/O DM (pt's mom)    BMI 45    H/O headaches    Rh negative state    History of heavy periods    SMA carrier status    Tachycardia    Hx Pre-E (G1)    Elevated Early 1 hr, normal 3 hr GTT. High-risk pregnancy in third trimester    Gestational hypertension (G2)     23 F Apg 8/9 Wt 7#13       Blood pressure 125/86, pulse (!) 103, height 5' 7\" (1.702 m), weight 259 lb (117.5 kg), currently breastfeeding. Chaperone for Intimate Exam  Chaperone was offered and accepted as part of the rooming process. Abdomen: Soft and non-tender; good bowel sounds; no guarding, rebound or rigidity; no CVA tenderness bilaterally. Extremities: No calf tenderness bilaterally. DTR 2/4 bilaterally. No edema. Perineum: Normal appearing vulva and vagina. Normal appearing cervix. Uterus palpates 6 wk size. Assessment:   Diagnosis Orders   1.  Postpartum care following vaginal delivery          Chief Complaint   Patient presents with    Postpartum Care     6 wk pp vaginal delivery breast feeding      Patient Active Problem List    Diagnosis Date Noted     21 F Apg 8/9 Wt 7#13 2023     Priority: High    Gestational hypertension (G2) 2023     Priority: Medium    High-risk pregnancy in third trimester 01/15/2023     Priority: Medium    Hx Pre-E (G1) 2022     Priority: Medium     Baby ASA @ 12 wks      Elevated Early 1 hr, normal 3 hr GTT. 2022     Repeat 3 hr WNL      Tachycardia 2021     And possible presyncopal episode, suspect POTS  Baseline EKG wnl 21      SMA carrier status 2021     SMN1+ carrier  Negative FOB testing      History of heavy periods 2021     OCPs in the past caused worsening acne and weight gain      Rh negative state 2021             Fam H/O DM (pt's mom) 2020     Early 1h       BMI 45 2020      testing 34 weeks      H/O headaches 2020     Usually treats with advil outside of pregnancy               Plan:  1. Signs & Symptoms of mastitis reviewed; notify if occurs  2. Secondary smoke risks reviewed. Increased risks of respiratory problems, Sudden     infant death syndrome, and potential malignancies. 3. Family planning counseling and STD counseling completed. Pull out method. 4.Return in about 4 months (around 2023) for annual exam.    Patient was seen with total face to face time of 15 minutes. More than 50% of this visit was on counseling and education regarding her    Diagnosis Orders   1. Postpartum care following vaginal delivery         and her options. She was also counseled on her preventative health maintenance recommendations and follow-up.      Daljit Aguilar MD

## 2023-02-27 ENCOUNTER — POSTPARTUM VISIT (OUTPATIENT)
Dept: OBGYN CLINIC | Age: 23
End: 2023-02-27

## 2023-02-27 VITALS
HEIGHT: 67 IN | DIASTOLIC BLOOD PRESSURE: 86 MMHG | WEIGHT: 259 LBS | SYSTOLIC BLOOD PRESSURE: 125 MMHG | BODY MASS INDEX: 40.65 KG/M2 | HEART RATE: 103 BPM

## 2023-02-27 PROCEDURE — 0503F POSTPARTUM CARE VISIT: CPT | Performed by: OBSTETRICS & GYNECOLOGY

## 2023-12-05 ENCOUNTER — OFFICE VISIT (OUTPATIENT)
Dept: PRIMARY CARE CLINIC | Age: 23
End: 2023-12-05
Payer: COMMERCIAL

## 2023-12-05 VITALS
HEART RATE: 90 BPM | DIASTOLIC BLOOD PRESSURE: 78 MMHG | WEIGHT: 255 LBS | BODY MASS INDEX: 39.94 KG/M2 | OXYGEN SATURATION: 99 % | SYSTOLIC BLOOD PRESSURE: 114 MMHG | TEMPERATURE: 97.8 F

## 2023-12-05 DIAGNOSIS — J01.90 ACUTE SINUSITIS, RECURRENCE NOT SPECIFIED, UNSPECIFIED LOCATION: ICD-10-CM

## 2023-12-05 DIAGNOSIS — J40 BRONCHITIS: Primary | ICD-10-CM

## 2023-12-05 PROCEDURE — 99203 OFFICE O/P NEW LOW 30 MIN: CPT | Performed by: PHYSICIAN ASSISTANT

## 2023-12-05 RX ORDER — AZITHROMYCIN 250 MG/1
250 TABLET, FILM COATED ORAL SEE ADMIN INSTRUCTIONS
Qty: 6 TABLET | Refills: 0 | Status: SHIPPED | OUTPATIENT
Start: 2023-12-05 | End: 2023-12-10

## 2023-12-05 RX ORDER — PREDNISONE 20 MG/1
20 TABLET ORAL 2 TIMES DAILY
Qty: 10 TABLET | Refills: 0 | Status: SHIPPED | OUTPATIENT
Start: 2023-12-05 | End: 2023-12-10

## 2023-12-05 ASSESSMENT — ENCOUNTER SYMPTOMS
RHINORRHEA: 1
COUGH: 1
EYES NEGATIVE: 1
SORE THROAT: 1
GASTROINTESTINAL NEGATIVE: 1

## 2023-12-05 NOTE — PROGRESS NOTES
[address] [facility]  [address]  Phone: [phone]  Fax: [phone] [facility]    Pt Name: Ayaz Hicks  MRN: [id number]  [address] [facility] 2000  Date of evaluation: 12/5/2023  Provider: Olivier Chopra PA-C     CHIEF COMPLAINT       Chief Complaint   Patient presents with    Cough     Cough for 1 week. Associated symptoms nasal congestion, drainage, headache, sore throat, and fatigue. Denies ear pain, nausea, vomiting, diarrhea, sob, or chest pain. HISTORY OF PRESENT ILLNESS  (Location/Symptom, Timing/Onset, Context/Setting, Quality, Duration, Modifying Factors, Severity.)   Ayaz Hicks is a 20 y.o. White (non-) [1] female who presents to the office for evaluation of      Cough  This is a new problem. The cough is Productive of sputum. Associated symptoms include ear congestion, headaches, nasal congestion, postnasal drip, rhinorrhea and a sore throat. Pertinent negatives include no chills, ear pain, fever, myalgias or rash. She has tried OTC cough suppressant for the symptoms. Nursing Notes were reviewed. REVIEW OF SYSTEMS    (2-9 systems for level 4, 10 or more for level 5)     Review of Systems   Constitutional:  Negative for chills and fever. HENT:  Positive for congestion, postnasal drip, rhinorrhea and sore throat. Negative for ear pain. Eyes: Negative. Respiratory:  Positive for cough. Cardiovascular: Negative. Gastrointestinal: Negative. Musculoskeletal:  Negative for myalgias. Skin:  Negative for rash. Neurological:  Positive for headaches. Except as noted above the remainder of the review of systems was reviewed andnegative. PAST MEDICAL HISTORY   History reviewed. Past Medical History:   Diagnosis Date    Hypertension 2021    elevated BP in labor and started on Mag    Pre-eclampsia          SURGICAL HISTORY     History reviewed.     Past Surgical History:   Procedure

## 2023-12-07 ENCOUNTER — OFFICE VISIT (OUTPATIENT)
Dept: PRIMARY CARE CLINIC | Age: 23
End: 2023-12-07
Payer: COMMERCIAL

## 2023-12-07 VITALS
SYSTOLIC BLOOD PRESSURE: 110 MMHG | OXYGEN SATURATION: 100 % | TEMPERATURE: 97.9 F | BODY MASS INDEX: 39.94 KG/M2 | HEART RATE: 72 BPM | DIASTOLIC BLOOD PRESSURE: 72 MMHG | WEIGHT: 255 LBS

## 2023-12-07 DIAGNOSIS — H66.001 NON-RECURRENT ACUTE SUPPURATIVE OTITIS MEDIA OF RIGHT EAR WITHOUT SPONTANEOUS RUPTURE OF TYMPANIC MEMBRANE: Primary | ICD-10-CM

## 2023-12-07 PROCEDURE — 99214 OFFICE O/P EST MOD 30 MIN: CPT | Performed by: FAMILY MEDICINE

## 2023-12-07 RX ORDER — AMOXICILLIN 875 MG/1
875 TABLET, COATED ORAL 2 TIMES DAILY
Qty: 20 TABLET | Refills: 0 | Status: SHIPPED | OUTPATIENT
Start: 2023-12-07 | End: 2023-12-17

## 2024-03-13 ENCOUNTER — OFFICE VISIT (OUTPATIENT)
Dept: PRIMARY CARE CLINIC | Age: 24
End: 2024-03-13
Payer: COMMERCIAL

## 2024-03-13 VITALS
BODY MASS INDEX: 40.18 KG/M2 | HEIGHT: 67 IN | WEIGHT: 256 LBS | TEMPERATURE: 97.5 F | HEART RATE: 90 BPM | DIASTOLIC BLOOD PRESSURE: 78 MMHG | SYSTOLIC BLOOD PRESSURE: 112 MMHG | OXYGEN SATURATION: 99 %

## 2024-03-13 DIAGNOSIS — J40 BRONCHITIS: Primary | ICD-10-CM

## 2024-03-13 PROCEDURE — 99213 OFFICE O/P EST LOW 20 MIN: CPT | Performed by: PHYSICIAN ASSISTANT

## 2024-03-13 RX ORDER — AZITHROMYCIN 250 MG/1
TABLET, FILM COATED ORAL
Qty: 6 TABLET | Refills: 0 | Status: SHIPPED | OUTPATIENT
Start: 2024-03-13 | End: 2024-03-23

## 2024-03-13 RX ORDER — PREDNISONE 20 MG/1
20 TABLET ORAL 2 TIMES DAILY
Qty: 10 TABLET | Refills: 0 | Status: SHIPPED | OUTPATIENT
Start: 2024-03-13 | End: 2024-03-18

## 2024-03-13 ASSESSMENT — ENCOUNTER SYMPTOMS
COUGH: 1
CHEST TIGHTNESS: 0
GASTROINTESTINAL NEGATIVE: 1

## 2024-03-13 NOTE — PROGRESS NOTES
tablet 0    azithromycin (ZITHROMAX) 250 MG tablet 500mg on day 1 followed by 250mg on days 2 - 5 6 tablet 0    EPINEPHrine (EPIPEN) 0.3 MG/0.3ML SOAJ injection Inject 0.3 mLs into the muscle once for 1 dose Use as directed for allergic reaction 1 each 0     No current facility-administered medications for this visit.         ALLERGIES     Bee venom    FAMILY HISTORY           Problem Relation Age of Onset    Diabetes type 2  Mother         IDDM    No Known Problems Father     No Known Problems Sister     No Known Problems Maternal Grandmother     No Known Problems Maternal Grandfather     No Known Problems Paternal Grandmother     No Known Problems Paternal Grandfather     Breast Cancer Neg Hx     Colon Cancer Neg Hx     Ovarian Cancer Neg Hx     Uterine Cancer Neg Hx      Family Status   Relation Name Status    Mother  Alive    Father  Alive    Sister  Alive    MGM  Alive    MGF  Alive    PGM  Alive    PGF  Alive    Neg Hx  (Not Specified)          SOCIAL HISTORY      reports that she has never smoked. She has never used smokeless tobacco. She reports that she does not currently use alcohol. She reports that she does not use drugs.      PHYSICAL EXAM    (up to 7 for level 4, 8 or more for level 5)     Vitals:    03/13/24 1423   BP: 112/78   Pulse: 90   Temp: 97.5 °F (36.4 °C)   SpO2: 99%   Weight: 116.1 kg (256 lb)   Height: 1.702 m (5' 7\")         Physical Exam  Vitals and nursing note reviewed.   Constitutional:       General: She is not in acute distress.     Appearance: Normal appearance. She is not ill-appearing.   HENT:      Head: Normocephalic and atraumatic.      Right Ear: External ear normal.      Left Ear: External ear normal.      Nose: Congestion present.      Mouth/Throat:      Mouth: Mucous membranes are moist.   Eyes:      Extraocular Movements: Extraocular movements intact.      Conjunctiva/sclera: Conjunctivae normal.      Pupils: Pupils are equal, round, and reactive to light.   Cardiovascular:

## 2024-03-20 ENCOUNTER — OFFICE VISIT (OUTPATIENT)
Dept: FAMILY MEDICINE CLINIC | Age: 24
End: 2024-03-20
Payer: COMMERCIAL

## 2024-03-20 VITALS
BODY MASS INDEX: 40.18 KG/M2 | SYSTOLIC BLOOD PRESSURE: 102 MMHG | OXYGEN SATURATION: 96 % | TEMPERATURE: 97.3 F | WEIGHT: 256 LBS | DIASTOLIC BLOOD PRESSURE: 62 MMHG | HEIGHT: 67 IN | HEART RATE: 71 BPM

## 2024-03-20 DIAGNOSIS — Z13.1 SCREENING FOR DIABETES MELLITUS: ICD-10-CM

## 2024-03-20 DIAGNOSIS — Z00.00 ANNUAL PHYSICAL EXAM: Primary | ICD-10-CM

## 2024-03-20 DIAGNOSIS — Z13.6 SCREENING FOR CARDIOVASCULAR CONDITION: ICD-10-CM

## 2024-03-20 DIAGNOSIS — Z91.030 HISTORY OF BEE STING ALLERGY: ICD-10-CM

## 2024-03-20 DIAGNOSIS — E66.01 OBESITY, CLASS III, BMI 40-49.9 (MORBID OBESITY) (HCC): ICD-10-CM

## 2024-03-20 DIAGNOSIS — Z13.29 SCREENING FOR THYROID DISORDER: ICD-10-CM

## 2024-03-20 DIAGNOSIS — Z13.220 SCREENING FOR LIPID DISORDERS: ICD-10-CM

## 2024-03-20 DIAGNOSIS — Z13.21 ENCOUNTER FOR VITAMIN DEFICIENCY SCREENING: ICD-10-CM

## 2024-03-20 PROCEDURE — 99395 PREV VISIT EST AGE 18-39: CPT | Performed by: REGISTERED NURSE

## 2024-03-20 RX ORDER — EPINEPHRINE 0.3 MG/.3ML
0.3 INJECTION SUBCUTANEOUS ONCE
Qty: 1 EACH | Refills: 0 | Status: SHIPPED | OUTPATIENT
Start: 2024-03-20 | End: 2024-03-20

## 2024-03-20 SDOH — ECONOMIC STABILITY: FOOD INSECURITY: WITHIN THE PAST 12 MONTHS, THE FOOD YOU BOUGHT JUST DIDN'T LAST AND YOU DIDN'T HAVE MONEY TO GET MORE.: NEVER TRUE

## 2024-03-20 SDOH — ECONOMIC STABILITY: INCOME INSECURITY: HOW HARD IS IT FOR YOU TO PAY FOR THE VERY BASICS LIKE FOOD, HOUSING, MEDICAL CARE, AND HEATING?: NOT HARD AT ALL

## 2024-03-20 SDOH — ECONOMIC STABILITY: FOOD INSECURITY: WITHIN THE PAST 12 MONTHS, YOU WORRIED THAT YOUR FOOD WOULD RUN OUT BEFORE YOU GOT MONEY TO BUY MORE.: NEVER TRUE

## 2024-03-20 SDOH — ECONOMIC STABILITY: HOUSING INSECURITY
IN THE LAST 12 MONTHS, WAS THERE A TIME WHEN YOU DID NOT HAVE A STEADY PLACE TO SLEEP OR SLEPT IN A SHELTER (INCLUDING NOW)?: NO

## 2024-03-20 ASSESSMENT — PATIENT HEALTH QUESTIONNAIRE - PHQ9
SUM OF ALL RESPONSES TO PHQ9 QUESTIONS 1 & 2: 0
1. LITTLE INTEREST OR PLEASURE IN DOING THINGS: NOT AT ALL
SUM OF ALL RESPONSES TO PHQ QUESTIONS 1-9: 0
2. FEELING DOWN, DEPRESSED OR HOPELESS: NOT AT ALL
SUM OF ALL RESPONSES TO PHQ QUESTIONS 1-9: 0

## 2024-03-20 ASSESSMENT — ANXIETY QUESTIONNAIRES
4. TROUBLE RELAXING: NOT AT ALL
GAD7 TOTAL SCORE: 0
7. FEELING AFRAID AS IF SOMETHING AWFUL MIGHT HAPPEN: NOT AT ALL
3. WORRYING TOO MUCH ABOUT DIFFERENT THINGS: NOT AT ALL
2. NOT BEING ABLE TO STOP OR CONTROL WORRYING: NOT AT ALL
1. FEELING NERVOUS, ANXIOUS, OR ON EDGE: NOT AT ALL
5. BEING SO RESTLESS THAT IT IS HARD TO SIT STILL: NOT AT ALL
6. BECOMING EASILY ANNOYED OR IRRITABLE: NOT AT ALL
IF YOU CHECKED OFF ANY PROBLEMS ON THIS QUESTIONNAIRE, HOW DIFFICULT HAVE THESE PROBLEMS MADE IT FOR YOU TO DO YOUR WORK, TAKE CARE OF THINGS AT HOME, OR GET ALONG WITH OTHER PEOPLE: NOT DIFFICULT AT ALL

## 2024-03-20 ASSESSMENT — ENCOUNTER SYMPTOMS
NAUSEA: 0
CONSTIPATION: 0
DIARRHEA: 0
COUGH: 1
VOMITING: 0
SHORTNESS OF BREATH: 0
SORE THROAT: 0

## 2024-03-20 NOTE — PROGRESS NOTES
[facility]  [facility] [address] [facility] [address]  Dept: [phone]    CHIEF COMPLAINT:      Chief Complaint   Patient presents with    New Patient     Patient has no concerns    Annual Exam       ASSESSMENT & PLAN     1. Annual physical exam  2. Obesity, Class III, BMI 40-49.9 (morbid obesity) (HCC)  3. History of bee sting allergy  -     EPINEPHrine (EPIPEN) 0.3 MG/0.3ML SOAJ injection; Inject 0.3 mLs into the muscle once for 1 dose Use as directed for allergic reaction, Disp-1 each, R-0Normal  4. Screening for cardiovascular condition  -     CBC; Future  5. Screening for lipid disorders  -     Lipid, Fasting; Future  6. Screening for thyroid disorder  -     TSH With Reflex Ft4; Future  7. Encounter for vitamin deficiency screening  -     Vitamin D 25 Hydroxy; Future  8. Screening for diabetes mellitus  -     Comprehensive Metabolic Panel, Fasting; Future     Return in about 1 year (around 3/20/2025) for annual.     Discussed her dietary changes: she is trying to do intermittent fasting but needs to have more healthy food choices, avoiding fast food, she tries to keep pop out of house.  Food journal, increase activity 150 minutes/day.  Will consider resuming Adipex patient to notify office if it is something she decides to do.  Hold her multivitamin for 2 weeks prior to getting labs done due to biotin can alter thyroid panel.      LULI     Naye Bowles is a 24 y.o. female who presents to [facility], she was previously seen by Maxine Redmond NP.  We are doing her annual exam today. She is , she has 2 children, she is employed as  and coaches volleyball and track. She denies tobacco, social alcohol, denies recreational drug use. She was recently seen in the walk in for a cough x 3 weeks, she was initially had yellowish green sputum, her cough is better but still there and is now clear sputum. She has completed the Z-aparna and steroid. She

## 2024-09-23 ENCOUNTER — OFFICE VISIT (OUTPATIENT)
Dept: PRIMARY CARE CLINIC | Age: 24
End: 2024-09-23
Payer: COMMERCIAL

## 2024-09-23 VITALS
WEIGHT: 276 LBS | HEART RATE: 70 BPM | TEMPERATURE: 97.9 F | SYSTOLIC BLOOD PRESSURE: 110 MMHG | OXYGEN SATURATION: 98 % | BODY MASS INDEX: 43.23 KG/M2 | DIASTOLIC BLOOD PRESSURE: 86 MMHG

## 2024-09-23 DIAGNOSIS — J02.9 SORE THROAT: ICD-10-CM

## 2024-09-23 DIAGNOSIS — J02.0 ACUTE STREPTOCOCCAL PHARYNGITIS: Primary | ICD-10-CM

## 2024-09-23 LAB — S PYO AG THROAT QL: POSITIVE

## 2024-09-23 PROCEDURE — 99213 OFFICE O/P EST LOW 20 MIN: CPT

## 2024-09-23 PROCEDURE — 87880 STREP A ASSAY W/OPTIC: CPT

## 2024-09-23 RX ORDER — AMOXICILLIN 500 MG/1
500 CAPSULE ORAL 2 TIMES DAILY
Qty: 20 CAPSULE | Refills: 0 | Status: SHIPPED | OUTPATIENT
Start: 2024-09-23 | End: 2024-10-03

## 2024-09-23 ASSESSMENT — ENCOUNTER SYMPTOMS
SORE THROAT: 1
COUGH: 0
EYE DISCHARGE: 0
WHEEZING: 0

## 2024-11-06 ENCOUNTER — HOSPITAL ENCOUNTER (EMERGENCY)
Age: 24
Discharge: HOME | End: 2024-11-06
Attending: EMERGENCY MEDICINE
Payer: COMMERCIAL

## 2024-11-06 VITALS
HEART RATE: 79 BPM | TEMPERATURE: 98.2 F | OXYGEN SATURATION: 100 % | SYSTOLIC BLOOD PRESSURE: 143 MMHG | BODY MASS INDEX: 42.38 KG/M2 | DIASTOLIC BLOOD PRESSURE: 69 MMHG | RESPIRATION RATE: 16 BRPM | WEIGHT: 270 LBS | HEIGHT: 67 IN
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
## 2024-11-06 DIAGNOSIS — S30.861A INSECT BITE OF ABDOMINAL WALL, INITIAL ENCOUNTER: Primary | ICD-10-CM

## 2024-11-06 PROCEDURE — 99284 EMERGENCY DEPT VISIT MOD MDM: CPT

## 2024-11-06 PROCEDURE — 6360000002 HC RX W HCPCS: Performed by: EMERGENCY MEDICINE

## 2024-11-06 PROCEDURE — 6370000000 HC RX 637 (ALT 250 FOR IP): Performed by: EMERGENCY MEDICINE

## 2024-11-06 PROCEDURE — 96372 THER/PROPH/DIAG INJ SC/IM: CPT

## 2024-11-06 RX ORDER — FAMOTIDINE 20 MG/1
20 TABLET, FILM COATED ORAL 2 TIMES DAILY
Qty: 10 TABLET | Refills: 0 | Status: SHIPPED | OUTPATIENT
Start: 2024-11-06 | End: 2024-11-11

## 2024-11-06 RX ORDER — PREDNISONE 50 MG/1
50 TABLET ORAL DAILY
Qty: 5 TABLET | Refills: 0 | Status: SHIPPED | OUTPATIENT
Start: 2024-11-06

## 2024-11-06 RX ORDER — FAMOTIDINE 20 MG/1
40 TABLET, FILM COATED ORAL ONCE
Status: COMPLETED | OUTPATIENT
Start: 2024-11-06 | End: 2024-11-06

## 2024-11-06 RX ADMIN — METHYLPREDNISOLONE SODIUM SUCCINATE 125 MG: 125 INJECTION, POWDER, FOR SOLUTION INTRAMUSCULAR; INTRAVENOUS at 17:54

## 2024-11-06 RX ADMIN — FAMOTIDINE 40 MG: 20 TABLET ORAL at 17:54

## 2024-11-06 ASSESSMENT — LIFESTYLE VARIABLES
HOW MANY STANDARD DRINKS CONTAINING ALCOHOL DO YOU HAVE ON A TYPICAL DAY: 1 OR 2
HOW OFTEN DO YOU HAVE A DRINK CONTAINING ALCOHOL: MONTHLY OR LESS

## 2024-11-06 ASSESSMENT — PAIN - FUNCTIONAL ASSESSMENT: PAIN_FUNCTIONAL_ASSESSMENT: 0-10

## 2024-11-06 ASSESSMENT — PAIN SCALES - GENERAL: PAINLEVEL_OUTOF10: 0

## 2024-11-06 NOTE — ED PROVIDER NOTES
[facility] Emergency Department  [address]  [facility] [address]  Phone: [phone]  Fax: [phone]  EMERGENCY DEPARTMENT ENCOUNTER      Pt Name: Naye Bowles  MRN: [id number]  Birthdate 2000  Date of evaluation: 11/6/2024    CHIEF COMPLAINT       Chief Complaint   Patient presents with    Insect Bite     Stung on her hip and abd about an hour ago, previous reaction resulted In swelling, x2 benadryl prior to arrival, currently no diff breathing or swallowing       HISTORY OF PRESENT ILLNESS    Naye Bowles is a 24 y.o. female who presents to the emergency department with 3 wasp stings that occurred 1 hour prior to arrival.  History of requiring an EpiPen years ago but did not have 1 available.  She denies any difficult breathing or difficulty swallowing.  No fevers or chills.  She was given 2 Benadryl prior to arrival.  She denies any other relevant symptoms.      REVIEW OF SYSTEMS       Constitutional: No fevers or chills   HENT: No sore throat, rhinorrhea, or earache   Eyes: No blurry vision or double vision no drainage   Cardiovascular: No chest pain or tachycardia   Respiratory: No wheezing or shortness of breath no cough   Gastrointestinal: No nausea, vomiting, diarrhea, constipation, or abdominal pain   : No hematuria or dysuria   Musculoskeletal: No swelling or pain   Skin: No rash positive wasp stings  Neurological: No focal neurologic complaints, paresthesias, weakness, or headache     PAST MEDICAL HISTORY    has a past medical history of Hypertension and Pre-eclampsia.    SURGICAL HISTORY      has a past surgical history that includes [place] tooth extraction.    CURRENT MEDICATIONS       Previous Medications    EPINEPHRINE (EPIPEN) 0.3 MG/0.3ML SOAJ INJECTION    Inject 0.3 mLs into the muscle once for 1 dose Use as directed for allergic reaction       ALLERGIES     is allergic to bee venom.    FAMILY HISTORY     She indicated that her mother is  there is no evidence for a more malignant underlying process, but also understands that early in the process of an illness or injury, an emergency department workup can be falsely reassuring.  Routine discharge counseling was given, and it is understood that worsening, changing or persistent symptoms should prompt an immediate call or follow up with their primary physician or return to the emergency department. The importance of appropriate follow up was also discussed.  I have reviewed the disposition diagnosis.  I have answered the questions and given discharge instructions.  There was voiced understanding of these instructions and no further questions or complaints.    FINAL IMPRESSION      1. Insect bite of abdominal wall, initial encounter          DISPOSITION/PLAN   DISPOSITION Decision To Discharge 11/06/2024 07:11:17 PM         CONDITION ON DISPOSITION: See chart     PATIENT REFERRED TO:  Maury Guzmán APRN - CNP  [address] [facility] [address]  [phone]    Schedule an appointment as soon as possible for a visit in 1 week        DISCHARGE MEDICATIONS:  New Prescriptions    FAMOTIDINE (PEPCID) 20 MG TABLET    Take 1 tablet by mouth 2 times daily for 5 days    PREDNISONE (DELTASONE) 50 MG TABLET    Take 1 tablet by mouth daily       (Please note that portions of this note were completed with a voice recognition program.  Efforts were made to edit the dictations but occasionally words are mis-transcribed.  Additionally, portions of this note may also include information that was incorporated after care transfer to another provider that were not available at the time of my evaluation.  Some of this information could likely include laboratory values, vital sign updates, medications etc.)    Paulo Aponte DO   Attending Emergency Physician      Paulo Aponte DO  11/06/24 [id number]

## 2024-11-07 NOTE — DISCHARGE INSTRUCTIONS
Take medications as prescribed    Return immediately if any worsening symptoms or any other concerns    Please understand that early in the process of an illness or injury, an emergency department workup can be falsely reassuring.      Tell us how we did visit: [email or web address]   and let us know about your experience

## 2025-04-17 ENCOUNTER — OFFICE VISIT (OUTPATIENT)
Dept: PRIMARY CARE CLINIC | Age: 25
End: 2025-04-17
Payer: COMMERCIAL

## 2025-04-17 VITALS
WEIGHT: 250 LBS | DIASTOLIC BLOOD PRESSURE: 68 MMHG | SYSTOLIC BLOOD PRESSURE: 118 MMHG | HEIGHT: 67 IN | HEART RATE: 63 BPM | OXYGEN SATURATION: 98 % | RESPIRATION RATE: 20 BRPM | BODY MASS INDEX: 39.24 KG/M2

## 2025-04-17 DIAGNOSIS — R05.1 ACUTE COUGH: ICD-10-CM

## 2025-04-17 DIAGNOSIS — R09.81 NASAL CONGESTION: ICD-10-CM

## 2025-04-17 DIAGNOSIS — J01.90 ACUTE SINUSITIS, RECURRENCE NOT SPECIFIED, UNSPECIFIED LOCATION: ICD-10-CM

## 2025-04-17 DIAGNOSIS — Z91.030 HISTORY OF BEE STING ALLERGY: Primary | ICD-10-CM

## 2025-04-17 LAB
INFLUENZA A ANTIGEN, POC: NEGATIVE
INFLUENZA B ANTIGEN, POC: NEGATIVE
STREP PYOGENES DNA, POC: NEGATIVE
VALID INTERNAL CONTROL, POC: NORMAL
VALID INTERNAL CONTROL, POC: NORMAL

## 2025-04-17 PROCEDURE — 87502 INFLUENZA DNA AMP PROBE: CPT

## 2025-04-17 PROCEDURE — 87651 STREP A DNA AMP PROBE: CPT

## 2025-04-17 PROCEDURE — 99213 OFFICE O/P EST LOW 20 MIN: CPT

## 2025-04-17 RX ORDER — FLUTICASONE PROPIONATE 50 MCG
2 SPRAY, SUSPENSION (ML) NASAL DAILY PRN
Qty: 1 EACH | Refills: 0 | Status: SHIPPED | OUTPATIENT
Start: 2025-04-17

## 2025-04-17 RX ORDER — EPINEPHRINE 0.3 MG/.3ML
0.3 INJECTION SUBCUTANEOUS ONCE
Qty: 1 EACH | Refills: 0 | Status: SHIPPED | OUTPATIENT
Start: 2025-04-17 | End: 2025-04-17

## 2025-04-17 ASSESSMENT — ENCOUNTER SYMPTOMS
COUGH: 1
NAUSEA: 0
CHEST TIGHTNESS: 0
VOMITING: 0
SINUS PAIN: 1
RHINORRHEA: 1
SORE THROAT: 1

## 2025-04-17 NOTE — PROGRESS NOTES
[facility]  [facility]ARE  [address]  [place] [address]    [facility] W[facility]  [address],  [address]  [facility] [address]  Dept: [phone]    Naye Bowles is a 25 y.o. female Established patient, who presents to the walk-in clinic today with conditions/complaints as noted below:    Chief Complaint   Patient presents with    Cough    Congestion    Pharyngitis     X 1 week          HPI:     Patient presents today with cold symptoms along with request for EpiPen refill for her bee allergy.     Cold Symptoms   This is a new problem. The current episode started in the past 7 days (1 week of symptoms worsening). The problem has been gradually worsening. Maximum temperature: thinks she had one a week ago- but  unsure as she did not check. Associated symptoms include congestion, coughing (productive), headaches (pressure), a plugged ear sensation (\"fullness\"), rhinorrhea (thick yellow- was clear- getting worse over the last 2 days), sinus pain and a sore throat. Pertinent negatives include no chest pain, ear pain, nausea or vomiting. Treatments tried: Dayquil/Mucinex. The treatment provided no relief.       Past Medical History:   Diagnosis Date    Hypertension 2021    elevated BP in labor and started on Mag    Pre-eclampsia        Current Outpatient Medications   Medication Sig Dispense Refill    EPINEPHrine (EPIPEN) 0.3 MG/0.3ML SOAJ injection Inject 0.3 mLs into the muscle once for 1 dose Use as directed for allergic reaction 1 each 0    amoxicillin-clavulanate (AUGMENTIN) 875-125 MG per tablet Take 1 tablet by mouth 2 times daily for 10 days 20 tablet 0    fluticasone (FLONASE) 50 MCG/ACT nasal spray 2 sprays by Each Nostril route daily as needed for Rhinitis (congestion) 1 each 0    predniSONE (DELTASONE) 50 MG tablet Take 1 tablet by mouth daily 5 tablet 0    famotidine (PEPCID) 20 MG tablet

## 2025-04-17 NOTE — PATIENT INSTRUCTIONS
Would advise Sudafed and or Claritin or Zyrtec to take daily while symptoms persist.  Encouraged to increase rest and hydration.

## 2025-06-24 NOTE — PATIENT INSTRUCTIONS
Patient Education        Week 45 of Your Pregnancy: Care Instructions  Your Care Instructions     Believe it or not, your baby is almost here. You may have ideas about your baby's personality because of how much he or she moves. Or you may have noticed how he or she responds to sounds, warmth, cold, and light. You may even know what kind of music your baby likes. By now, you have a better idea of what to expect during delivery. You may have talked about your birth preferences with your doctor. But even if you want a vaginal birth, it is a good idea to learn about  births.  birth means that your baby is born through a cut (incision) in your lower belly. It is sometimes the best choice for the health of the baby and the mother. Follow-up care is a key part of your treatment and safety. Be sure to make and go to all appointments, and call your doctor if you are having problems. It's also a good idea to know your test results and keep a list of the medicines you take. How can you care for yourself at home? Learn about  birth  · Most C-sections are unplanned. They are done because of problems that occur during labor. These problems might include:  ? Labor that slows or stops. ? High blood pressure or other problems for the mother. ? Signs of distress in the baby. These signs may include a very fast or slow heart rate. · Although most mothers and babies do well after , it is major surgery. It has more risks than a vaginal delivery. · In some cases, a planned  may be safer than a vaginal delivery. This may be the case if:  ? The mother has a health problem, such as a heart condition. ? The baby isn't in a head-down position for delivery. This is called a breech position. ? The uterus has scars from past surgeries. This could increase the chance of a tear in the uterus. ? There is a problem with the placenta. ?  The mother has an infection, such as genital herpes, that could be spread to the baby. ? The mother is having twins or more. ? The baby weighs 9 to 10 pounds or more. · Because of the risks of , planned C-sections generally should be done only for medical reasons. And a planned  should be done at 39 weeks or later unless there is a medical reason to do it sooner. Know what to expect after delivery, and plan for the first few weeks at home  · You, your baby, and your partner or  will get identification bands. Only people with matching bands can  the baby from the nursery. · You will learn how to feed, diaper, and bathe your baby. And you will learn how to care for the umbilical cord stump. If your baby will be circumcised, you will also learn how to care for that. · Ask people to wait to visit you until you are at home. And ask them to wash their hands before they touch your baby. · Make sure you have another adult in your home for at least 2 or 3 days after the birth. · During the first 2 weeks, limit when friends and family can visit. · Do not allow visitors who have colds or infections. Make sure all visitors are up to date with their vaccinations. Never let anyone smoke around your baby. · Try to nap when the baby naps. Be aware of postpartum depression  · \"Baby blues\" are common for the first 1 to 2 weeks after birth. You may cry or feel sad or irritable for no reason. · For some women, these feelings last longer and are more intense. This is called postpartum depression. · If your symptoms last for more than a few weeks or you feel very depressed, ask your doctor for help. · Postpartum depression can be treated. Support groups and counseling can help. Sometimes medicine can also help. Where can you learn more? Go to [email or web address]://juanito.[email or web address] org and sign in to your [email or web address] account. Enter B044 in the [employer] box to learn more about \"Week 38 of Your Pregnancy: Care Instructions. \"     If you do not [Negative] : Heme/Lymph